# Patient Record
Sex: MALE | Race: WHITE | NOT HISPANIC OR LATINO
[De-identification: names, ages, dates, MRNs, and addresses within clinical notes are randomized per-mention and may not be internally consistent; named-entity substitution may affect disease eponyms.]

---

## 2017-04-20 PROBLEM — Z00.00 ENCOUNTER FOR PREVENTIVE HEALTH EXAMINATION: Status: ACTIVE | Noted: 2017-04-20

## 2017-04-28 ENCOUNTER — APPOINTMENT (OUTPATIENT)
Dept: ORTHOPEDIC SURGERY | Facility: CLINIC | Age: 82
End: 2017-04-28

## 2017-04-28 VITALS
WEIGHT: 170 LBS | DIASTOLIC BLOOD PRESSURE: 65 MMHG | SYSTOLIC BLOOD PRESSURE: 110 MMHG | BODY MASS INDEX: 29.02 KG/M2 | HEIGHT: 64 IN | HEART RATE: 84 BPM

## 2017-04-28 DIAGNOSIS — Z86.39 PERSONAL HISTORY OF OTHER ENDOCRINE, NUTRITIONAL AND METABOLIC DISEASE: ICD-10-CM

## 2017-06-22 ENCOUNTER — OUTPATIENT (OUTPATIENT)
Dept: OUTPATIENT SERVICES | Facility: HOSPITAL | Age: 82
LOS: 1 days | End: 2017-06-22
Payer: MEDICARE

## 2017-06-22 VITALS
HEIGHT: 62 IN | RESPIRATION RATE: 16 BRPM | OXYGEN SATURATION: 98 % | HEART RATE: 88 BPM | SYSTOLIC BLOOD PRESSURE: 130 MMHG | WEIGHT: 177.47 LBS | DIASTOLIC BLOOD PRESSURE: 70 MMHG | TEMPERATURE: 99 F

## 2017-06-22 DIAGNOSIS — G56.01 CARPAL TUNNEL SYNDROME, RIGHT UPPER LIMB: ICD-10-CM

## 2017-06-22 DIAGNOSIS — Z01.818 ENCOUNTER FOR OTHER PREPROCEDURAL EXAMINATION: ICD-10-CM

## 2017-06-22 DIAGNOSIS — Z98.42 CATARACT EXTRACTION STATUS, LEFT EYE: Chronic | ICD-10-CM

## 2017-06-22 DIAGNOSIS — Z98.41 CATARACT EXTRACTION STATUS, RIGHT EYE: Chronic | ICD-10-CM

## 2017-06-22 DIAGNOSIS — Z98.890 OTHER SPECIFIED POSTPROCEDURAL STATES: Chronic | ICD-10-CM

## 2017-06-22 LAB
ANION GAP SERPL CALC-SCNC: 4 MMOL/L — LOW (ref 5–17)
BUN SERPL-MCNC: 29 MG/DL — HIGH (ref 7–23)
CALCIUM SERPL-MCNC: 9.5 MG/DL — SIGNIFICANT CHANGE UP (ref 8.4–10.5)
CHLORIDE SERPL-SCNC: 104 MMOL/L — SIGNIFICANT CHANGE UP (ref 96–108)
CO2 SERPL-SCNC: 31 MMOL/L — SIGNIFICANT CHANGE UP (ref 22–31)
CREAT SERPL-MCNC: 1.15 MG/DL — SIGNIFICANT CHANGE UP (ref 0.5–1.3)
GLUCOSE SERPL-MCNC: 353 MG/DL — HIGH (ref 70–99)
HCT VFR BLD CALC: 42.5 % — SIGNIFICANT CHANGE UP (ref 39–50)
HGB BLD-MCNC: 14.4 G/DL — SIGNIFICANT CHANGE UP (ref 13–17)
MCHC RBC-ENTMCNC: 29.2 PG — SIGNIFICANT CHANGE UP (ref 27–34)
MCHC RBC-ENTMCNC: 33.8 GM/DL — SIGNIFICANT CHANGE UP (ref 32–36)
MCV RBC AUTO: 86.3 FL — SIGNIFICANT CHANGE UP (ref 80–100)
PLATELET # BLD AUTO: 153 K/UL — SIGNIFICANT CHANGE UP (ref 150–400)
POTASSIUM SERPL-MCNC: 5 MMOL/L — SIGNIFICANT CHANGE UP (ref 3.5–5.3)
POTASSIUM SERPL-SCNC: 5 MMOL/L — SIGNIFICANT CHANGE UP (ref 3.5–5.3)
RBC # BLD: 4.92 M/UL — SIGNIFICANT CHANGE UP (ref 4.2–5.8)
RBC # FLD: 12.6 % — SIGNIFICANT CHANGE UP (ref 10.3–14.5)
SODIUM SERPL-SCNC: 139 MMOL/L — SIGNIFICANT CHANGE UP (ref 135–145)
WBC # BLD: 7.5 K/UL — SIGNIFICANT CHANGE UP (ref 3.8–10.5)
WBC # FLD AUTO: 7.5 K/UL — SIGNIFICANT CHANGE UP (ref 3.8–10.5)

## 2017-06-22 PROCEDURE — 83036 HEMOGLOBIN GLYCOSYLATED A1C: CPT

## 2017-06-22 PROCEDURE — 80048 BASIC METABOLIC PNL TOTAL CA: CPT

## 2017-06-22 PROCEDURE — 85027 COMPLETE CBC AUTOMATED: CPT

## 2017-06-22 PROCEDURE — 36415 COLL VENOUS BLD VENIPUNCTURE: CPT

## 2017-06-22 PROCEDURE — G0463: CPT

## 2017-06-22 NOTE — H&P PST ADULT - HISTORY OF PRESENT ILLNESS
82yo male patient with approximately 5-6 month history of pain, cramping, numbness and tingling in his right hand. He reports that the pain is about 6-7/10. He is not taking any pain medication. He states that he had some therapy and injection to the area without improvement. He was told that Carpal Tunnel Release is recommended and presents today for PSTs.

## 2017-06-22 NOTE — H&P PST ADULT - PSH
S/P hernia repair  approx 2013  S/P left cataract extraction  approx 2014  S/P right cataract extraction  approx 2014

## 2017-06-22 NOTE — H&P PST ADULT - PMH
BPH (benign prostatic hypertrophy)    Carpal tunnel syndrome of right wrist    Diabetes mellitus, type 2    Obesity, Class I, BMI 30-34.9

## 2017-06-22 NOTE — H&P PST ADULT - MUSCULOSKELETAL
details… detailed exam right hand/no joint warmth/diminished strength/no joint erythema/decreased ROM due to pain/no joint swelling/no calf tenderness

## 2017-06-22 NOTE — H&P PST ADULT - NSANTHOSAYNRD_GEN_A_CORE
No. JAKI screening performed.  STOP BANG Legend: 0-2 = LOW Risk; 3-4 = INTERMEDIATE Risk; 5-8 = HIGH Risk

## 2017-06-22 NOTE — H&P PST ADULT - ASSESSMENT
80yo male patient scheduled for surgery on 6/27/17. He will obtain medical clearance from his PMD. He will obtain instructions on holding his diabetes medication from his PMD. He will be NPO as per Anesthesia and will take Pepcid and Flomax on AM of surgery with a sip of water. All other pre-op instructions reviewed with patient.

## 2017-06-27 ENCOUNTER — APPOINTMENT (OUTPATIENT)
Dept: ORTHOPEDIC SURGERY | Facility: HOSPITAL | Age: 82
End: 2017-06-27

## 2017-06-28 ENCOUNTER — CHART COPY (OUTPATIENT)
Age: 82
End: 2017-06-28

## 2018-01-12 ENCOUNTER — APPOINTMENT (OUTPATIENT)
Dept: ORTHOPEDIC SURGERY | Facility: CLINIC | Age: 83
End: 2018-01-12
Payer: MEDICARE

## 2018-01-12 VITALS — HEIGHT: 64 IN | BODY MASS INDEX: 29.02 KG/M2 | WEIGHT: 170 LBS

## 2018-01-12 PROCEDURE — 99214 OFFICE O/P EST MOD 30 MIN: CPT

## 2018-01-22 ENCOUNTER — OUTPATIENT (OUTPATIENT)
Dept: OUTPATIENT SERVICES | Facility: HOSPITAL | Age: 83
LOS: 1 days | End: 2018-01-22
Payer: MEDICARE

## 2018-01-22 VITALS
DIASTOLIC BLOOD PRESSURE: 75 MMHG | RESPIRATION RATE: 14 BRPM | HEIGHT: 62 IN | WEIGHT: 175.05 LBS | HEART RATE: 88 BPM | SYSTOLIC BLOOD PRESSURE: 126 MMHG | TEMPERATURE: 99 F

## 2018-01-22 DIAGNOSIS — G56.01 CARPAL TUNNEL SYNDROME, RIGHT UPPER LIMB: ICD-10-CM

## 2018-01-22 DIAGNOSIS — Z01.818 ENCOUNTER FOR OTHER PREPROCEDURAL EXAMINATION: ICD-10-CM

## 2018-01-22 DIAGNOSIS — Z98.42 CATARACT EXTRACTION STATUS, LEFT EYE: Chronic | ICD-10-CM

## 2018-01-22 DIAGNOSIS — Z98.890 OTHER SPECIFIED POSTPROCEDURAL STATES: Chronic | ICD-10-CM

## 2018-01-22 DIAGNOSIS — Z98.41 CATARACT EXTRACTION STATUS, RIGHT EYE: Chronic | ICD-10-CM

## 2018-01-22 LAB
ALBUMIN SERPL ELPH-MCNC: 3.5 G/DL — SIGNIFICANT CHANGE UP (ref 3.3–5)
ALP SERPL-CCNC: 75 U/L — SIGNIFICANT CHANGE UP (ref 30–120)
ALT FLD-CCNC: 22 U/L DA — SIGNIFICANT CHANGE UP (ref 10–60)
ANION GAP SERPL CALC-SCNC: 8 MMOL/L — SIGNIFICANT CHANGE UP (ref 5–17)
AST SERPL-CCNC: 19 U/L — SIGNIFICANT CHANGE UP (ref 10–40)
BILIRUB SERPL-MCNC: 0.6 MG/DL — SIGNIFICANT CHANGE UP (ref 0.2–1.2)
BUN SERPL-MCNC: 26 MG/DL — HIGH (ref 7–23)
CALCIUM SERPL-MCNC: 9 MG/DL — SIGNIFICANT CHANGE UP (ref 8.4–10.5)
CHLORIDE SERPL-SCNC: 102 MMOL/L — SIGNIFICANT CHANGE UP (ref 96–108)
CO2 SERPL-SCNC: 28 MMOL/L — SIGNIFICANT CHANGE UP (ref 22–31)
CREAT SERPL-MCNC: 1.06 MG/DL — SIGNIFICANT CHANGE UP (ref 0.5–1.3)
GLUCOSE SERPL-MCNC: 288 MG/DL — HIGH (ref 70–99)
HBA1C BLD-MCNC: 8.1 % — HIGH (ref 4–5.6)
HCT VFR BLD CALC: 40.8 % — SIGNIFICANT CHANGE UP (ref 39–50)
HGB BLD-MCNC: 13.8 G/DL — SIGNIFICANT CHANGE UP (ref 13–17)
MCHC RBC-ENTMCNC: 29 PG — SIGNIFICANT CHANGE UP (ref 27–34)
MCHC RBC-ENTMCNC: 33.9 GM/DL — SIGNIFICANT CHANGE UP (ref 32–36)
MCV RBC AUTO: 85.5 FL — SIGNIFICANT CHANGE UP (ref 80–100)
PLATELET # BLD AUTO: 114 K/UL — LOW (ref 150–400)
POTASSIUM SERPL-MCNC: 4.3 MMOL/L — SIGNIFICANT CHANGE UP (ref 3.5–5.3)
POTASSIUM SERPL-SCNC: 4.3 MMOL/L — SIGNIFICANT CHANGE UP (ref 3.5–5.3)
PROT SERPL-MCNC: 7.7 G/DL — SIGNIFICANT CHANGE UP (ref 6–8.3)
RBC # BLD: 4.77 M/UL — SIGNIFICANT CHANGE UP (ref 4.2–5.8)
RBC # FLD: 13.1 % — SIGNIFICANT CHANGE UP (ref 10.3–14.5)
SODIUM SERPL-SCNC: 138 MMOL/L — SIGNIFICANT CHANGE UP (ref 135–145)
WBC # BLD: 6.5 K/UL — SIGNIFICANT CHANGE UP (ref 3.8–10.5)
WBC # FLD AUTO: 6.5 K/UL — SIGNIFICANT CHANGE UP (ref 3.8–10.5)

## 2018-01-22 PROCEDURE — 93010 ELECTROCARDIOGRAM REPORT: CPT | Mod: NC

## 2018-01-22 PROCEDURE — G0463: CPT

## 2018-01-22 PROCEDURE — 36415 COLL VENOUS BLD VENIPUNCTURE: CPT

## 2018-01-22 PROCEDURE — 80053 COMPREHEN METABOLIC PANEL: CPT

## 2018-01-22 PROCEDURE — 83036 HEMOGLOBIN GLYCOSYLATED A1C: CPT

## 2018-01-22 PROCEDURE — 85027 COMPLETE CBC AUTOMATED: CPT

## 2018-01-22 PROCEDURE — 93005 ELECTROCARDIOGRAM TRACING: CPT

## 2018-01-22 NOTE — H&P PST ADULT - PROBLEM SELECTOR PLAN 1
"Endoscopic right carpal tunnel release" on 1/30/18  Pre op instructions were reviewed and signed  Patient will obtain medical clearance, instructions for diabetes medications

## 2018-01-22 NOTE — H&P PST ADULT - HISTORY OF PRESENT ILLNESS
81 yo male is scheduled fir 'endoscopic right carpal tunnel release" on 1/30/18 with David Ramirez MD.  Complains of right wrist pain with carpal tunnel syndrome for 6 months with numbness and tingling in fingers.

## 2018-01-22 NOTE — H&P PST ADULT - MUSCULOSKELETAL
details… detailed exam no joint warmth/no joint erythema/no joint swelling/decreased ROM due to pain/no calf tenderness

## 2018-01-25 NOTE — PROVIDER CONTACT NOTE (OTHER) - ASSESSMENT
Patient now understands the importance of glucose control with pending surgery, he agreed to comitor 2 x/day and report to  PCP if glucose levels remain > 200mg/dl

## 2018-01-25 NOTE — PROVIDER CONTACT NOTE (OTHER) - BACKGROUND
Type 2 diabetes on Trajenta 5 mg po daily and Glimepiride 4 mg po daily only monitors his glucose every AM

## 2018-01-30 ENCOUNTER — APPOINTMENT (OUTPATIENT)
Dept: ORTHOPEDIC SURGERY | Facility: HOSPITAL | Age: 83
End: 2018-01-30

## 2018-10-09 PROBLEM — E11.9 TYPE 2 DIABETES MELLITUS WITHOUT COMPLICATIONS: Chronic | Status: ACTIVE | Noted: 2017-06-22

## 2018-10-09 PROBLEM — E66.9 OBESITY, UNSPECIFIED: Chronic | Status: ACTIVE | Noted: 2017-06-22

## 2018-10-09 PROBLEM — N40.0 BENIGN PROSTATIC HYPERPLASIA WITHOUT LOWER URINARY TRACT SYMPTOMS: Chronic | Status: ACTIVE | Noted: 2017-06-22

## 2018-10-17 ENCOUNTER — APPOINTMENT (OUTPATIENT)
Dept: ORTHOPEDIC SURGERY | Facility: CLINIC | Age: 83
End: 2018-10-17

## 2019-01-02 ENCOUNTER — APPOINTMENT (OUTPATIENT)
Dept: ORTHOPEDIC SURGERY | Facility: CLINIC | Age: 84
End: 2019-01-02
Payer: COMMERCIAL

## 2019-01-02 VITALS
WEIGHT: 188 LBS | DIASTOLIC BLOOD PRESSURE: 67 MMHG | BODY MASS INDEX: 35.5 KG/M2 | HEART RATE: 87 BPM | HEIGHT: 61 IN | SYSTOLIC BLOOD PRESSURE: 121 MMHG

## 2019-01-02 PROCEDURE — 99214 OFFICE O/P EST MOD 30 MIN: CPT

## 2019-02-06 ENCOUNTER — APPOINTMENT (OUTPATIENT)
Dept: ORTHOPEDIC SURGERY | Facility: CLINIC | Age: 84
End: 2019-02-06

## 2019-02-21 RX ORDER — GLIMEPIRIDE 1 MG
1 TABLET ORAL
Qty: 0 | Refills: 0 | COMMUNITY

## 2019-02-27 ENCOUNTER — OUTPATIENT (OUTPATIENT)
Dept: OUTPATIENT SERVICES | Facility: HOSPITAL | Age: 84
LOS: 1 days | End: 2019-02-27
Payer: MEDICARE

## 2019-02-27 VITALS
TEMPERATURE: 99 F | WEIGHT: 179.9 LBS | SYSTOLIC BLOOD PRESSURE: 139 MMHG | HEART RATE: 98 BPM | HEIGHT: 62 IN | OXYGEN SATURATION: 96 % | DIASTOLIC BLOOD PRESSURE: 77 MMHG

## 2019-02-27 DIAGNOSIS — Z98.41 CATARACT EXTRACTION STATUS, RIGHT EYE: Chronic | ICD-10-CM

## 2019-02-27 DIAGNOSIS — Z98.42 CATARACT EXTRACTION STATUS, LEFT EYE: Chronic | ICD-10-CM

## 2019-02-27 DIAGNOSIS — Z98.890 OTHER SPECIFIED POSTPROCEDURAL STATES: Chronic | ICD-10-CM

## 2019-02-27 DIAGNOSIS — G56.01 CARPAL TUNNEL SYNDROME, RIGHT UPPER LIMB: ICD-10-CM

## 2019-02-27 DIAGNOSIS — Z01.818 ENCOUNTER FOR OTHER PREPROCEDURAL EXAMINATION: ICD-10-CM

## 2019-02-27 LAB
ALBUMIN SERPL ELPH-MCNC: 3.8 G/DL — SIGNIFICANT CHANGE UP (ref 3.3–5)
ALP SERPL-CCNC: 84 U/L — SIGNIFICANT CHANGE UP (ref 30–120)
ALT FLD-CCNC: 22 U/L DA — SIGNIFICANT CHANGE UP (ref 10–60)
ANION GAP SERPL CALC-SCNC: 8 MMOL/L — SIGNIFICANT CHANGE UP (ref 5–17)
AST SERPL-CCNC: 18 U/L — SIGNIFICANT CHANGE UP (ref 10–40)
BILIRUB SERPL-MCNC: 0.8 MG/DL — SIGNIFICANT CHANGE UP (ref 0.2–1.2)
BUN SERPL-MCNC: 23 MG/DL — SIGNIFICANT CHANGE UP (ref 7–23)
CALCIUM SERPL-MCNC: 9.6 MG/DL — SIGNIFICANT CHANGE UP (ref 8.4–10.5)
CHLORIDE SERPL-SCNC: 102 MMOL/L — SIGNIFICANT CHANGE UP (ref 96–108)
CO2 SERPL-SCNC: 30 MMOL/L — SIGNIFICANT CHANGE UP (ref 22–31)
CREAT SERPL-MCNC: 1.08 MG/DL — SIGNIFICANT CHANGE UP (ref 0.5–1.3)
GLUCOSE SERPL-MCNC: 260 MG/DL — HIGH (ref 70–99)
HCT VFR BLD CALC: 43.9 % — SIGNIFICANT CHANGE UP (ref 39–50)
HGB BLD-MCNC: 14.3 G/DL — SIGNIFICANT CHANGE UP (ref 13–17)
MCHC RBC-ENTMCNC: 28.5 PG — SIGNIFICANT CHANGE UP (ref 27–34)
MCHC RBC-ENTMCNC: 32.6 GM/DL — SIGNIFICANT CHANGE UP (ref 32–36)
MCV RBC AUTO: 87.6 FL — SIGNIFICANT CHANGE UP (ref 80–100)
NRBC # BLD: 0 /100 WBCS — SIGNIFICANT CHANGE UP (ref 0–0)
PLATELET # BLD AUTO: 117 K/UL — LOW (ref 150–400)
POTASSIUM SERPL-MCNC: 4.4 MMOL/L — SIGNIFICANT CHANGE UP (ref 3.5–5.3)
POTASSIUM SERPL-SCNC: 4.4 MMOL/L — SIGNIFICANT CHANGE UP (ref 3.5–5.3)
PROT SERPL-MCNC: 7.6 G/DL — SIGNIFICANT CHANGE UP (ref 6–8.3)
RBC # BLD: 5.01 M/UL — SIGNIFICANT CHANGE UP (ref 4.2–5.8)
RBC # FLD: 13.7 % — SIGNIFICANT CHANGE UP (ref 10.3–14.5)
SODIUM SERPL-SCNC: 140 MMOL/L — SIGNIFICANT CHANGE UP (ref 135–145)
WBC # BLD: 5.72 K/UL — SIGNIFICANT CHANGE UP (ref 3.8–10.5)
WBC # FLD AUTO: 5.72 K/UL — SIGNIFICANT CHANGE UP (ref 3.8–10.5)

## 2019-02-27 PROCEDURE — 80053 COMPREHEN METABOLIC PANEL: CPT

## 2019-02-27 PROCEDURE — 85027 COMPLETE CBC AUTOMATED: CPT

## 2019-02-27 PROCEDURE — 36415 COLL VENOUS BLD VENIPUNCTURE: CPT

## 2019-02-27 PROCEDURE — G0463: CPT

## 2019-02-27 PROCEDURE — 83036 HEMOGLOBIN GLYCOSYLATED A1C: CPT

## 2019-02-27 NOTE — H&P PST ADULT - HISTORY OF PRESENT ILLNESS
82 yo male presents to [facility] for scheduled endoscopic RIGHT carpal tunnel release on 3/19/19 with David Ramirez MD.  Patient accompanied by HHA and Iranian speaking,  videophone used.  Complains of right wrist pain with numbness and loss of sensation in right fingers for many years.  He was previously scheduled for surgery and postponed at his decision.

## 2019-02-27 NOTE — H&P PST ADULT - PROBLEM SELECTOR PLAN 1
"Endoscopic right carpal tunnel release" on 3/19/19  Diagnostics ordered  Medical and cardiac clearances in chart  Instructions reviewed using Monegasque video .  Best wishes offered

## 2019-02-27 NOTE — H&P PST ADULT - ASSESSMENT
82 yo male is scheduled for endoscopic RIGHT carpal tunnel release on 3/19/19 at [facility] with Dr Ramirez.

## 2019-02-28 LAB — HBA1C BLD-MCNC: 9.1 % — HIGH (ref 4–5.6)

## 2019-03-18 ENCOUNTER — TRANSCRIPTION ENCOUNTER (OUTPATIENT)
Age: 84
End: 2019-03-18

## 2019-03-19 ENCOUNTER — APPOINTMENT (OUTPATIENT)
Dept: ORTHOPEDIC SURGERY | Facility: HOSPITAL | Age: 84
End: 2019-03-19

## 2019-03-19 ENCOUNTER — OUTPATIENT (OUTPATIENT)
Dept: OUTPATIENT SERVICES | Facility: HOSPITAL | Age: 84
LOS: 1 days | Discharge: HOME | End: 2019-03-19
Payer: MEDICARE

## 2019-03-19 VITALS — SYSTOLIC BLOOD PRESSURE: 150 MMHG | HEART RATE: 72 BPM | DIASTOLIC BLOOD PRESSURE: 72 MMHG | RESPIRATION RATE: 15 BRPM

## 2019-03-19 VITALS
TEMPERATURE: 98 F | OXYGEN SATURATION: 99 % | SYSTOLIC BLOOD PRESSURE: 142 MMHG | HEIGHT: 62 IN | DIASTOLIC BLOOD PRESSURE: 74 MMHG | WEIGHT: 178.57 LBS | RESPIRATION RATE: 16 BRPM | HEART RATE: 82 BPM

## 2019-03-19 DIAGNOSIS — Z98.41 CATARACT EXTRACTION STATUS, RIGHT EYE: Chronic | ICD-10-CM

## 2019-03-19 DIAGNOSIS — Z98.42 CATARACT EXTRACTION STATUS, LEFT EYE: Chronic | ICD-10-CM

## 2019-03-19 DIAGNOSIS — G56.01 CARPAL TUNNEL SYNDROME, RIGHT UPPER LIMB: ICD-10-CM

## 2019-03-19 DIAGNOSIS — Z98.890 OTHER SPECIFIED POSTPROCEDURAL STATES: Chronic | ICD-10-CM

## 2019-03-19 LAB — GLUCOSE BLDC GLUCOMTR-MCNC: 157 MG/DL — HIGH (ref 70–99)

## 2019-03-19 PROCEDURE — 82962 GLUCOSE BLOOD TEST: CPT

## 2019-03-19 PROCEDURE — 29848 WRIST ENDOSCOPY/SURGERY: CPT | Mod: RT

## 2019-03-19 RX ORDER — CHLORHEXIDINE GLUCONATE 213 G/1000ML
1 SOLUTION TOPICAL ONCE
Qty: 0 | Refills: 0 | Status: COMPLETED | OUTPATIENT
Start: 2019-03-19 | End: 2019-03-19

## 2019-03-19 RX ORDER — SODIUM CHLORIDE 9 MG/ML
1000 INJECTION, SOLUTION INTRAVENOUS
Qty: 0 | Refills: 0 | Status: DISCONTINUED | OUTPATIENT
Start: 2019-03-19 | End: 2019-03-19

## 2019-03-19 RX ORDER — OXYCODONE HYDROCHLORIDE 5 MG/1
5 TABLET ORAL ONCE
Qty: 0 | Refills: 0 | Status: DISCONTINUED | OUTPATIENT
Start: 2019-03-19 | End: 2019-03-19

## 2019-03-19 RX ORDER — HYDROMORPHONE HYDROCHLORIDE 2 MG/ML
0.5 INJECTION INTRAMUSCULAR; INTRAVENOUS; SUBCUTANEOUS
Qty: 0 | Refills: 0 | Status: DISCONTINUED | OUTPATIENT
Start: 2019-03-19 | End: 2019-03-19

## 2019-03-19 RX ORDER — CEFAZOLIN SODIUM 1 G
2000 VIAL (EA) INJECTION ONCE
Qty: 0 | Refills: 0 | Status: COMPLETED | OUTPATIENT
Start: 2019-03-19 | End: 2019-03-19

## 2019-03-19 RX ORDER — OXYCODONE HYDROCHLORIDE 5 MG/1
10 TABLET ORAL ONCE
Qty: 0 | Refills: 0 | Status: DISCONTINUED | OUTPATIENT
Start: 2019-03-19 | End: 2019-03-19

## 2019-03-19 RX ORDER — ACETAMINOPHEN WITH CODEINE 300MG-30MG
1 TABLET ORAL
Qty: 5 | Refills: 0
Start: 2019-03-19

## 2019-03-19 RX ADMIN — SODIUM CHLORIDE 50 MILLILITER(S): 9 INJECTION, SOLUTION INTRAVENOUS at 11:56

## 2019-03-19 RX ADMIN — CHLORHEXIDINE GLUCONATE 1 APPLICATION(S): 213 SOLUTION TOPICAL at 10:37

## 2019-03-19 NOTE — ASU DISCHARGE PLAN (ADULT/PEDIATRIC) - CALL YOUR DOCTOR IF YOU HAVE ANY OF THE FOLLOWING:
Fever greater than (need to indicate Fahrenheit or Celsius)/Pain not relieved by Medications/Wound/Surgical Site with redness, or foul smelling discharge or pus/Numbness, tingling, color or temperature change to extremity

## 2019-03-19 NOTE — ASU DISCHARGE PLAN (ADULT/PEDIATRIC) - PAIN MANAGEMENT
Prescriptions electronically submitted to pharmacy from [facility] Prescriptions electronically submitted to pharmacy from [facility]/Prescription called to pharmacy

## 2019-03-19 NOTE — ASU DISCHARGE PLAN (ADULT/PEDIATRIC) - CARE PROVIDER_API CALL
David Ramirez (MD)  Orthopaedic Surgery; Surgery of the Hand  [address], [facility] [address]  [place] [address]  Phone: [phone]  Fax: [phone]  Follow Up Time: 1-3 days

## 2019-03-22 ENCOUNTER — APPOINTMENT (OUTPATIENT)
Dept: ORTHOPEDIC SURGERY | Facility: CLINIC | Age: 84
End: 2019-03-22
Payer: MEDICARE

## 2019-03-22 PROCEDURE — 99024 POSTOP FOLLOW-UP VISIT: CPT

## 2019-03-22 NOTE — HISTORY OF PRESENT ILLNESS
[de-identified] : 3 days postoperative [de-identified] : 3 days status post endoscopic right carpal tunnel release.  He came along, but I called her son on the phone, who spoke to him and he, translating to Kuwaiti.  He told me that he that he is doing well in his hand no longer wakes him up at night. [de-identified] : Examination of his right wrist, and hand after the dressing was removed, demonstrates his incision to be clean and dry.  There is mild swelling and ecchymosis.  He has appropriate motion of his digits.  He has improved sensation to light touch along the median nerve distribution, with normal sensation along the radial and ulnar nerve distributions. [de-identified] : Stable, 3 days postoperative. [de-identified] : The suture ends were cut and Steri-Strips were applied.  He and his son were instructed on local wound care and gentle range of motion exercises. He will followup in 3 weeks.  At that time, he would like to discuss when he should schedule the left side.

## 2019-04-10 ENCOUNTER — APPOINTMENT (OUTPATIENT)
Dept: ORTHOPEDIC SURGERY | Facility: CLINIC | Age: 84
End: 2019-04-10
Payer: MEDICARE

## 2019-04-10 PROCEDURE — 99024 POSTOP FOLLOW-UP VISIT: CPT

## 2019-04-10 NOTE — HISTORY OF PRESENT ILLNESS
[de-identified] : 22 days status post endoscopic right carpal tunnel release.  He came alone, but I called his son on the phone, who spoke to him and he, translating to Barbadian.  \par \par He states that his pain is improved, but he continues to have so residual me numbness compared to preoperative.  However, his pain, and it is much improved. [de-identified] : 22 days postoperative [de-identified] : Examination of his right wrist, and hand demonstrates his incision to be healing well.  He has improved range of motion of his digits.  His neurologic examination is somewhat difficult to evaluate today, but he does have complaints of some residual numbness along the median nerve distribution. [de-identified] : Again, I spoke to him, using his son on the telephone as a  to Bulgarian.  He would like to go for hand therapy.  He was given a proper referral.  He will followup in one month.  At that time, a determination will be made whether or not to schedule endoscopic left carpal tunnel release. [de-identified] : Stable, 22 days postoperative.  He has residual numbness at this point as expected appeared however, his pain and burning at night or improved.

## 2019-05-09 PROBLEM — G56.01 CARPAL TUNNEL SYNDROME OF RIGHT WRIST: Status: ACTIVE | Noted: 2017-04-28

## 2019-05-10 ENCOUNTER — APPOINTMENT (OUTPATIENT)
Dept: ORTHOPEDIC SURGERY | Facility: CLINIC | Age: 84
End: 2019-05-10
Payer: MEDICARE

## 2019-05-10 VITALS — BODY MASS INDEX: 32.1 KG/M2 | HEIGHT: 64 IN | WEIGHT: 188 LBS

## 2019-05-10 DIAGNOSIS — G56.01 CARPAL TUNNEL SYNDROME, RIGHT UPPER LIMB: ICD-10-CM

## 2019-05-10 PROCEDURE — 99024 POSTOP FOLLOW-UP VISIT: CPT

## 2019-05-10 NOTE — HISTORY OF PRESENT ILLNESS
[de-identified] : 52 days postoperative [de-identified] : 52 days status post endoscopic right carpal tunnel release.  He came alone, but I called his son on the phone, who spoke to him and he, translating to Cymraes.  \par \par He states that his pain is improved, but he continues to have so residual me numbness compared to preoperative.  However, he no longer has the burning and pain that he had preoperatively.  He does have some filling of the hand being pulled when it is cold outside her.  He is in occupational therapy and he has noted improvement with therapy.  He is bothered by the left hand and would like to discuss scheduling.  Carpal tunnel release on that side. [de-identified] : Examination of his right wrist, and hand demonstrates his incision to be healing well.  He has improved range of motion of his digits.  His neurologic examination is somewhat difficult to evaluate today, but he does have complaints of some residual numbness along the median nerve distribution.\par \par Examination of his left hand demonstrates obvious thenar atrophy and decreased sensation to light touch along the median nerve distribution.  Provocative signs for carpal tunnel syndrome, more difficult to evaluate. [de-identified] : Stable, 52 days postoperative.  He has residual numbness at this point as expected appeared however, his pain and burning at night or improved. [de-identified] : I spoke to him and his son on the telephone, and his son helped with translation.  With regard to the right hand, he will continue occupational therapy and a home exercise program.\par \par With regard to his left carpal tunnel syndrome, he would like to go and schedule release.  Again, she understands that there are no guarantees that his symptoms will improve.  We previously discussed all associated risks and benefits.  Again, these were discussed and he understands that if I cannot safely release the nerve endoscopically, then I may need to convert to an open release.  I also discussed risks of nerve injury, stiffness, scar tissue, and other associated complications.

## 2019-05-10 NOTE — HISTORY OF PRESENT ILLNESS
[de-identified] : 52 days postoperative [de-identified] : 52 days status post endoscopic right carpal tunnel release.  He came alone, but I called his son on the phone, who spoke to him and he, translating to Canadian.  \par \par He states that his pain is improved, but he continues to have so residual me numbness compared to preoperative.  However, he no longer has the burning and pain that he had preoperatively.  He does have some filling of the hand being pulled when it is cold outside her.  He is in occupational therapy and he has noted improvement with therapy.  He is bothered by the left hand and would like to discuss scheduling.  Carpal tunnel release on that side. [de-identified] : Examination of his right wrist, and hand demonstrates his incision to be healing well.  He has improved range of motion of his digits.  His neurologic examination is somewhat difficult to evaluate today, but he does have complaints of some residual numbness along the median nerve distribution.\par \par Examination of his left hand demonstrates obvious thenar atrophy and decreased sensation to light touch along the median nerve distribution.  Provocative signs for carpal tunnel syndrome, more difficult to evaluate. [de-identified] : Stable, 52 days postoperative.  He has residual numbness at this point as expected appeared however, his pain and burning at night or improved. [de-identified] : I spoke to him and his son on the telephone, and his son helped with translation.  With regard to the right hand, he will continue occupational therapy and a home exercise program.\par \par With regard to his left carpal tunnel syndrome, he would like to go and schedule release.  Again, she understands that there are no guarantees that his symptoms will improve.  We previously discussed all associated risks and benefits.  Again, these were discussed and he understands that if I cannot safely release the nerve endoscopically, then I may need to convert to an open release.  I also discussed risks of nerve injury, stiffness, scar tissue, and other associated complications.

## 2019-05-14 ENCOUNTER — OUTPATIENT (OUTPATIENT)
Dept: OUTPATIENT SERVICES | Facility: HOSPITAL | Age: 84
LOS: 1 days | End: 2019-05-14
Payer: MEDICARE

## 2019-05-14 VITALS
OXYGEN SATURATION: 98 % | HEIGHT: 62 IN | SYSTOLIC BLOOD PRESSURE: 129 MMHG | DIASTOLIC BLOOD PRESSURE: 77 MMHG | RESPIRATION RATE: 18 BRPM | WEIGHT: 184.09 LBS | TEMPERATURE: 98 F

## 2019-05-14 DIAGNOSIS — Z01.818 ENCOUNTER FOR OTHER PREPROCEDURAL EXAMINATION: ICD-10-CM

## 2019-05-14 DIAGNOSIS — G56.02 CARPAL TUNNEL SYNDROME, LEFT UPPER LIMB: ICD-10-CM

## 2019-05-14 DIAGNOSIS — Z98.42 CATARACT EXTRACTION STATUS, LEFT EYE: Chronic | ICD-10-CM

## 2019-05-14 DIAGNOSIS — Z98.890 OTHER SPECIFIED POSTPROCEDURAL STATES: Chronic | ICD-10-CM

## 2019-05-14 DIAGNOSIS — Z98.41 CATARACT EXTRACTION STATUS, RIGHT EYE: Chronic | ICD-10-CM

## 2019-05-14 LAB
ANION GAP SERPL CALC-SCNC: 6 MMOL/L — SIGNIFICANT CHANGE UP (ref 5–17)
BUN SERPL-MCNC: 24 MG/DL — HIGH (ref 7–23)
CALCIUM SERPL-MCNC: 9.4 MG/DL — SIGNIFICANT CHANGE UP (ref 8.4–10.5)
CHLORIDE SERPL-SCNC: 105 MMOL/L — SIGNIFICANT CHANGE UP (ref 96–108)
CO2 SERPL-SCNC: 29 MMOL/L — SIGNIFICANT CHANGE UP (ref 22–31)
CREAT SERPL-MCNC: 1.13 MG/DL — SIGNIFICANT CHANGE UP (ref 0.5–1.3)
GLUCOSE SERPL-MCNC: 326 MG/DL — HIGH (ref 70–99)
HBA1C BLD-MCNC: 10.7 % — HIGH (ref 4–5.6)
HCT VFR BLD CALC: 45.8 % — SIGNIFICANT CHANGE UP (ref 39–50)
HGB BLD-MCNC: 14.5 G/DL — SIGNIFICANT CHANGE UP (ref 13–17)
MCHC RBC-ENTMCNC: 28 PG — SIGNIFICANT CHANGE UP (ref 27–34)
MCHC RBC-ENTMCNC: 31.7 GM/DL — LOW (ref 32–36)
MCV RBC AUTO: 88.6 FL — SIGNIFICANT CHANGE UP (ref 80–100)
NRBC # BLD: 0 /100 WBCS — SIGNIFICANT CHANGE UP (ref 0–0)
PLATELET # BLD AUTO: 119 K/UL — LOW (ref 150–400)
POTASSIUM SERPL-MCNC: 5.3 MMOL/L — SIGNIFICANT CHANGE UP (ref 3.5–5.3)
POTASSIUM SERPL-SCNC: 5.3 MMOL/L — SIGNIFICANT CHANGE UP (ref 3.5–5.3)
RBC # BLD: 5.17 M/UL — SIGNIFICANT CHANGE UP (ref 4.2–5.8)
RBC # FLD: 13.3 % — SIGNIFICANT CHANGE UP (ref 10.3–14.5)
SODIUM SERPL-SCNC: 140 MMOL/L — SIGNIFICANT CHANGE UP (ref 135–145)
WBC # BLD: 5.42 K/UL — SIGNIFICANT CHANGE UP (ref 3.8–10.5)
WBC # FLD AUTO: 5.42 K/UL — SIGNIFICANT CHANGE UP (ref 3.8–10.5)

## 2019-05-14 PROCEDURE — 36415 COLL VENOUS BLD VENIPUNCTURE: CPT

## 2019-05-14 PROCEDURE — 80048 BASIC METABOLIC PNL TOTAL CA: CPT

## 2019-05-14 PROCEDURE — 83036 HEMOGLOBIN GLYCOSYLATED A1C: CPT

## 2019-05-14 PROCEDURE — G0463: CPT

## 2019-05-14 PROCEDURE — 85027 COMPLETE CBC AUTOMATED: CPT

## 2019-05-14 RX ORDER — DEXTROSE 50 % IN WATER 50 %
25 SYRINGE (ML) INTRAVENOUS ONCE
Refills: 0 | Status: DISCONTINUED | OUTPATIENT
Start: 2019-05-21 | End: 2019-06-05

## 2019-05-14 RX ORDER — GLIMEPIRIDE 1 MG
1 TABLET ORAL
Qty: 0 | Refills: 0 | DISCHARGE

## 2019-05-14 RX ORDER — DEXTROSE 50 % IN WATER 50 %
15 SYRINGE (ML) INTRAVENOUS ONCE
Refills: 0 | Status: DISCONTINUED | OUTPATIENT
Start: 2019-05-21 | End: 2019-06-05

## 2019-05-14 RX ORDER — TAMSULOSIN HYDROCHLORIDE 0.4 MG/1
1 CAPSULE ORAL
Qty: 0 | Refills: 0 | DISCHARGE

## 2019-05-14 RX ORDER — DEXTROSE 50 % IN WATER 50 %
12.5 SYRINGE (ML) INTRAVENOUS ONCE
Refills: 0 | Status: DISCONTINUED | OUTPATIENT
Start: 2019-05-21 | End: 2019-06-05

## 2019-05-14 RX ORDER — GLUCAGON INJECTION, SOLUTION 0.5 MG/.1ML
1 INJECTION, SOLUTION SUBCUTANEOUS ONCE
Refills: 0 | Status: DISCONTINUED | OUTPATIENT
Start: 2019-05-21 | End: 2019-06-05

## 2019-05-14 RX ORDER — SODIUM CHLORIDE 9 MG/ML
1000 INJECTION, SOLUTION INTRAVENOUS
Refills: 0 | Status: DISCONTINUED | OUTPATIENT
Start: 2019-05-21 | End: 2019-06-05

## 2019-05-14 NOTE — H&P PST ADULT - SKIN/BREAST
Saline lock established, blood and first set of cultures sent. Fluid bolus started and PCXR at the bedside.   negative

## 2019-05-14 NOTE — H&P PST ADULT - NSICDXPASTMEDICALHX_GEN_ALL_CORE_FT
PAST MEDICAL HISTORY:  BPH (benign prostatic hypertrophy)     Carpal tunnel syndrome of right wrist 3/19/19    Chronic GERD     Diabetes mellitus, type 2     Mild hypertension no po meds    Obesity, Class I, BMI 30-34.9

## 2019-05-14 NOTE — H&P PST ADULT - NSICDXPASTSURGICALHX_GEN_ALL_CORE_FT
PAST SURGICAL HISTORY:  History of carpal tunnel surgery of right wrist     S/P hernia repair approx 2013    S/P left cataract extraction approx 2014    S/P right cataract extraction approx 2014

## 2019-05-14 NOTE — H&P PST ADULT - NSICDXPROBLEM_GEN_ALL_CORE_FT
PROBLEM DIAGNOSES  Problem: Carpal tunnel syndrome on left  Assessment and Plan: endoscopic left carpal tunnel release on 5/21/19  medical clearance in chart-pending labs results  aide with him  phone used to give instructions

## 2019-05-14 NOTE — H&P PST ADULT - HISTORY OF PRESENT ILLNESS
82 yo male presents to [facility] for scheduled endoscopic left carpal tunnel release on 5/21/19with David Ramirez MD.  Patient accompanied by HHA and Senegalese speaking,  videophone used.  Complains of left wrist pain with numbness and loss of sensation in right fingers for many years. 84 yo male presents to [facility] for scheduled endoscopic left carpal tunnel release on 5/21/19with David Ramirez MD.  Patient accompanied by HHA and Palestinian speaking,  phone used.  Complains of left wrist pain with numbness and loss of sensation in right fingers for many years.

## 2019-05-15 RX ORDER — SODIUM CHLORIDE 9 MG/ML
1000 INJECTION, SOLUTION INTRAVENOUS
Refills: 0 | Status: DISCONTINUED | OUTPATIENT
Start: 2019-05-21 | End: 2019-06-05

## 2019-05-15 NOTE — PROVIDER CONTACT NOTE (OTHER) - RECOMMENDATIONS
See endocrinologist  resume all medications  Monitor glucose    goal range 100 to 200mg/dl  Clearance for  surgery.

## 2019-05-15 NOTE — PROVIDER CONTACT NOTE (OTHER) - ASSESSMENT
Patient  understands the need to see his diabetes doctor to  resume tradjenta.  He is unable to  give me his doctors number which he plans to see today.  He is aware of  need for glucose control before  surgery will be done.  Will call me back with doctors number

## 2019-05-20 ENCOUNTER — TRANSCRIPTION ENCOUNTER (OUTPATIENT)
Age: 84
End: 2019-05-20

## 2019-05-20 NOTE — PROVIDER CONTACT NOTE (OTHER) - RECOMMENDATIONS
called back to confirm  surgery for tomorrow  See post for  diabetes education  No oral diabetes pills  day of surgery

## 2019-05-20 NOTE — PROVIDER CONTACT NOTE (OTHER) - ASSESSMENT
Patient does not understand  need for clearance in order to have surgery, confirmed with PST  clearance was obtained and patient  for surgery tomorrow

## 2019-05-20 NOTE — PROVIDER CONTACT NOTE (OTHER) - SITUATION
Patient called informed me that  he is scheduled for surgery tomorrow,  Endocrine office Dr Beckman on Friday stated  clearance was not given due to hyperglycemia

## 2019-05-21 ENCOUNTER — OUTPATIENT (OUTPATIENT)
Dept: OUTPATIENT SERVICES | Facility: HOSPITAL | Age: 84
LOS: 1 days | End: 2019-05-21
Payer: MEDICARE

## 2019-05-21 ENCOUNTER — APPOINTMENT (OUTPATIENT)
Dept: ORTHOPEDIC SURGERY | Facility: HOSPITAL | Age: 84
End: 2019-05-21

## 2019-05-21 VITALS
HEIGHT: 61 IN | DIASTOLIC BLOOD PRESSURE: 69 MMHG | HEART RATE: 75 BPM | WEIGHT: 177.25 LBS | RESPIRATION RATE: 12 BRPM | TEMPERATURE: 98 F | SYSTOLIC BLOOD PRESSURE: 129 MMHG | OXYGEN SATURATION: 100 %

## 2019-05-21 VITALS
HEART RATE: 73 BPM | RESPIRATION RATE: 14 BRPM | OXYGEN SATURATION: 98 % | SYSTOLIC BLOOD PRESSURE: 139 MMHG | DIASTOLIC BLOOD PRESSURE: 59 MMHG

## 2019-05-21 DIAGNOSIS — G56.02 CARPAL TUNNEL SYNDROME, LEFT UPPER LIMB: ICD-10-CM

## 2019-05-21 DIAGNOSIS — Z98.890 OTHER SPECIFIED POSTPROCEDURAL STATES: Chronic | ICD-10-CM

## 2019-05-21 DIAGNOSIS — Z98.41 CATARACT EXTRACTION STATUS, RIGHT EYE: Chronic | ICD-10-CM

## 2019-05-21 DIAGNOSIS — Z98.42 CATARACT EXTRACTION STATUS, LEFT EYE: Chronic | ICD-10-CM

## 2019-05-21 LAB
GLUCOSE BLDC GLUCOMTR-MCNC: 145 MG/DL — HIGH (ref 70–99)
GLUCOSE BLDC GLUCOMTR-MCNC: 73 MG/DL — SIGNIFICANT CHANGE UP (ref 70–99)
GLUCOSE BLDC GLUCOMTR-MCNC: 85 MG/DL — SIGNIFICANT CHANGE UP (ref 70–99)

## 2019-05-21 PROCEDURE — 82962 GLUCOSE BLOOD TEST: CPT

## 2019-05-21 PROCEDURE — 29848 WRIST ENDOSCOPY/SURGERY: CPT | Mod: 79,LT

## 2019-05-21 PROCEDURE — 29848 WRIST ENDOSCOPY/SURGERY: CPT | Mod: LT

## 2019-05-21 RX ORDER — CHLORHEXIDINE GLUCONATE 213 G/1000ML
1 SOLUTION TOPICAL ONCE
Refills: 0 | Status: COMPLETED | OUTPATIENT
Start: 2019-05-21 | End: 2019-05-21

## 2019-05-21 RX ORDER — HYDROMORPHONE HYDROCHLORIDE 2 MG/ML
0.5 INJECTION INTRAMUSCULAR; INTRAVENOUS; SUBCUTANEOUS
Refills: 0 | Status: DISCONTINUED | OUTPATIENT
Start: 2019-05-21 | End: 2019-05-22

## 2019-05-21 RX ORDER — ONDANSETRON 8 MG/1
4 TABLET, FILM COATED ORAL ONCE
Refills: 0 | Status: COMPLETED | OUTPATIENT
Start: 2019-05-21 | End: 2019-05-21

## 2019-05-21 RX ORDER — ACETAMINOPHEN WITH CODEINE 300MG-30MG
1 TABLET ORAL
Qty: 5 | Refills: 0
Start: 2019-05-21

## 2019-05-21 RX ORDER — OXYCODONE HYDROCHLORIDE 5 MG/1
5 TABLET ORAL ONCE
Refills: 0 | Status: DISCONTINUED | OUTPATIENT
Start: 2019-05-21 | End: 2019-05-22

## 2019-05-21 RX ORDER — CEFAZOLIN SODIUM 1 G
2000 VIAL (EA) INJECTION ONCE
Refills: 0 | Status: COMPLETED | OUTPATIENT
Start: 2019-05-21 | End: 2019-05-21

## 2019-05-21 RX ORDER — SODIUM CHLORIDE 9 MG/ML
1000 INJECTION, SOLUTION INTRAVENOUS
Refills: 0 | Status: DISCONTINUED | OUTPATIENT
Start: 2019-05-21 | End: 2019-05-22

## 2019-05-21 RX ADMIN — CHLORHEXIDINE GLUCONATE 1 APPLICATION(S): 213 SOLUTION TOPICAL at 11:32

## 2019-05-21 RX ADMIN — ONDANSETRON 4 MILLIGRAM(S): 8 TABLET, FILM COATED ORAL at 15:45

## 2019-05-21 NOTE — ASU PATIENT PROFILE, ADULT - PMH
BPH (benign prostatic hypertrophy)    Carpal tunnel syndrome of right wrist  3/19/19  Chronic GERD    Diabetes mellitus, type 2    Mild hypertension  no po meds  Obesity, Class I, BMI 30-34.9

## 2019-05-21 NOTE — ASU PATIENT PROFILE, ADULT - PSH
History of carpal tunnel surgery of right wrist    S/P hernia repair  approx 2013  S/P left cataract extraction  approx 2014  S/P right cataract extraction  approx 2014

## 2019-05-21 NOTE — ASU DISCHARGE PLAN (ADULT/PEDIATRIC) - CARE PROVIDER_API CALL
David Ramirez)  Orthopaedic Surgery; Surgery of the Hand  [address], [facility] [address]  [place] [address]  Phone: [phone]  Fax: [phone]  Follow Up Time:

## 2019-05-21 NOTE — ASU DISCHARGE PLAN (ADULT/PEDIATRIC) - ASU DC SPECIAL INSTRUCTIONSFT
Keep surgical incision clean and dry  Keep left arm elevated with sling  Ice packs as needed for pain and swelling  Mobilize fingers every hour while awake

## 2019-05-22 PROBLEM — I10 ESSENTIAL (PRIMARY) HYPERTENSION: Chronic | Status: ACTIVE | Noted: 2019-05-14

## 2019-05-22 PROBLEM — G56.01 CARPAL TUNNEL SYNDROME, RIGHT UPPER LIMB: Chronic | Status: ACTIVE | Noted: 2017-06-22

## 2019-05-22 PROBLEM — K21.9 GASTRO-ESOPHAGEAL REFLUX DISEASE WITHOUT ESOPHAGITIS: Chronic | Status: ACTIVE | Noted: 2019-05-14

## 2019-05-29 ENCOUNTER — APPOINTMENT (OUTPATIENT)
Dept: ORTHOPEDIC SURGERY | Facility: CLINIC | Age: 84
End: 2019-05-29
Payer: MEDICARE

## 2019-05-31 ENCOUNTER — APPOINTMENT (OUTPATIENT)
Dept: ORTHOPEDIC SURGERY | Facility: CLINIC | Age: 84
End: 2019-05-31
Payer: MEDICARE

## 2019-05-31 PROCEDURE — 99024 POSTOP FOLLOW-UP VISIT: CPT

## 2019-05-31 NOTE — HISTORY OF PRESENT ILLNESS
[de-identified] : 10 days postoperative. [de-identified] : 10 days status post endoscopic left carpal tunnel release.  He is doing well.  I spoke to him and his son on the telephone for translation.  His numbness and tingling and pain are much improved compared to preoperative.\par \par He is status post right carpal tunnel release on 3/19/2019. [de-identified] : Examination of his left hand demonstrates his incision to be clean and dry.  There is minimal erythema, but this appears secondary to inflammation as opposed to an infection.  There is no drainage.  His digital flexion and extension is comparable to preoperative.  He has intact sensation to light touch throughout the digits. [de-identified] : Stable, 10 days postoperative. [de-identified] : The suture end were cut  and Steri-Strips were applied.  He was instructed on local wound care and range of motion exercises.  He was referred to occupational therapy, which she is really going to for the right hand.  He will followup in one month.  He will followup before then if there are any problems.

## 2019-06-28 PROBLEM — G56.02 CARPAL TUNNEL SYNDROME OF LEFT WRIST: Status: ACTIVE | Noted: 2017-04-28

## 2019-06-30 NOTE — ASU PATIENT PROFILE, ADULT - RELATIONSHIP TO PATIENT
"Pt here today from SO home after 911 was called by pt. Pt states she made a mistake by calling 911 because she was angry at SO. 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 Pt SO was arrested at the home and brought to [facility] [place]. Pt is very upset that SO is currently in [facility] and will have a \"no contact\" order put on him. Pt states she is going to [place] on Monday. 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 Pt BOBY .145 per [place] [employer]. There were many prescription medications included in pt possessions.     Pt changed in in scrubs, possessions recorded and removed from room. Pt on  hold.     "
son

## 2019-07-03 ENCOUNTER — APPOINTMENT (OUTPATIENT)
Dept: ORTHOPEDIC SURGERY | Facility: CLINIC | Age: 84
End: 2019-07-03

## 2019-07-03 DIAGNOSIS — G56.02 CARPAL TUNNEL SYNDROME, LEFT UPPER LIMB: ICD-10-CM

## 2020-01-16 NOTE — ASU PREOP CHECKLIST - LATEX ALLERGY
[FreeTextEntry1] : STAGE IA SCCA vulva/vagina (microscopic)\par WLE of the posterior left vulva 1/1/17.\par \par She has a history of bladder and colon cancers. \par \par Last seen 5/2019 after a CT scan demonstrated extensive adenopathy in left groin, pelvis and paraortic regions. \par \par FNA of a left groin node was positive for poorly differentiated squamous carcinoma. After extensive review of that and previous pathology, the impression of [facility] pathology department was that the recurrence was most compatible with a bladder cancer recurrence. \par \par She elected to go to [facility] [place] opinion. Their review suggested a recurrent vulvar cancer. She was treated with systemic chemotherapy (CDDP or Carbo + Taxol) and palliative RT to the left groin. \par \par Her most recent imaging shows obvious progression with new liver lesions and increased yamileth disease. \par \par She has been offered participation in a clinical trial at [facility] - an open label study of trastuzamab/pertuzemab, alectinib and atezolizumab in patient with advanced cancer. \par \par She presents today for a second opinion. \par \par She overall feels well. Had difficulty tolerating chemotherapy due to renal dysfunction and possibly neuropathy. \par 
no

## 2020-04-01 RX ADMIN — Medication 1: at 12:35

## 2020-04-01 RX ADMIN — Medication 2: at 12:50

## 2020-04-11 RX ADMIN — Medication 1: at 08:27

## 2020-04-18 ENCOUNTER — EMERGENCY (EMERGENCY)
Facility: HOSPITAL | Age: 85
LOS: 1 days | Discharge: HOME | End: 2020-04-18
Attending: EMERGENCY MEDICINE
Payer: MEDICARE

## 2020-04-18 VITALS
SYSTOLIC BLOOD PRESSURE: 112 MMHG | WEIGHT: 169.98 LBS | HEIGHT: 62 IN | HEART RATE: 95 BPM | TEMPERATURE: 103 F | RESPIRATION RATE: 20 BRPM | DIASTOLIC BLOOD PRESSURE: 65 MMHG | OXYGEN SATURATION: 98 %

## 2020-04-18 DIAGNOSIS — Z98.890 OTHER SPECIFIED POSTPROCEDURAL STATES: Chronic | ICD-10-CM

## 2020-04-18 DIAGNOSIS — Z98.42 CATARACT EXTRACTION STATUS, LEFT EYE: Chronic | ICD-10-CM

## 2020-04-18 DIAGNOSIS — Z98.41 CATARACT EXTRACTION STATUS, RIGHT EYE: Chronic | ICD-10-CM

## 2020-04-18 PROCEDURE — 99284 EMERGENCY DEPT VISIT MOD MDM: CPT

## 2020-04-18 PROCEDURE — 93010 ELECTROCARDIOGRAM REPORT: CPT

## 2020-04-18 RX ORDER — ACETAMINOPHEN 500 MG
650 TABLET ORAL ONCE
Refills: 0 | Status: DISCONTINUED | OUTPATIENT
Start: 2020-04-18 | End: 2020-04-19

## 2020-04-18 NOTE — ED PROVIDER NOTE - NEUROLOGICAL, MLM
A&O x2. CN 2-12 grossly intact, speech clear per interpretor, able to follow basic commands. Moving all 4 extremities equally

## 2020-04-18 NOTE — ED PROVIDER NOTE - PATIENT PORTAL LINK FT
You can access the FollowMyHealth Patient Portal offered by [facility] by registering at the following website: [email or web address]. By joining [employer]’s FollowMyHealth portal, you will also be able to view your health information using other applications (apps) compatible with our system.

## 2020-04-18 NOTE — ED PROVIDER NOTE - NSFOLLOWUPINSTRUCTIONS_ED_ALL_ED_FT
You were seen and evaluated for possible COVID 19 infection. Testing was performed--call  during regular business hours for results. We think you are safe for discharge and would like you to follow up in a two to three days with your doctor by phone or in person.   You should treat fevers by taking Tylenol as needed.     You should stay hydrated and increase the amount of fluid you drink.  Return to the Emergency Department if your shortness of breath becomes worse, you become weak, or new symptoms develop.    You should monitor your temperature and quarantine yourself away from others - particularly the elderly, ill, or immunosuppressed - for the next 14 days.    We recommend the below precautionary steps from now until 14 days from when you returned from your travel or date of your last known possible contact:  - Do not go to work, school, or public areas. Avoid using public transportation, ride-sharing, or taxis.  -Wear a mask whenever you are around other people.  -Avoid sharing personal household items. You should not share dishes, drinking glasses, cups, eating utensils, towels, or bedding with other people or pets in your home. After using these items, they should be washed thoroughly with soap and water.   - Avoid touching your eyes, nose, and mouth with your hands.  - Wash your hands often with soap and water for at least 15 to 20 seconds or clean your hands with an alcohol-based hand  that contains 60 to 95% alcohol, covering all surfaces of your hands and rubbing them together until they feel dry. Soap and water should be used preferentially if hands are visibly dirty.

## 2020-04-18 NOTE — ED PROVIDER NOTE - ATTENDING CONTRIBUTION TO CARE
Ce Cummings MD - Attending Physician: I have personally seen and examined this patient with the resident/fellow.  I have fully participated in the care of this patient. I have reviewed all pertinent clinical information, including history, physical exam, plan and the Resident/Fellow’s note and agree except as noted. See MDM

## 2020-04-18 NOTE — ED PROVIDER NOTE - PROGRESS NOTE DETAILS
D/w pt's son likely dx  of COVID, however pt is satting well on RA and sleeping comfortably, does not have admission criteria at this time. Return precautions discussed w/ son, and a different son will be coming to . D/w pt's son likely dx  of COVID, however pt is satting well on RA and sleeping comfortably, does not have admission criteria at this time. Return precautions discussed w/ son. Per son earliest he could come to pick pt up was 9am; informed him that pt is stable and ready for discharge now, may be waiting in waiting room depending on bed need.

## 2020-04-18 NOTE — ED PROVIDER NOTE - OBJECTIVE STATEMENT
hx DM   92%, SBP 80s, got 400cc NS andO2 with improvement. witnessed presyncopal episdoe while eating COVID last week was negative but was given HCQ?. 5 days of weakness and fever. 84 M [place] speaking, hx DM presented to ED b/c of presyncopal episode. Per EMS: on arrival, 92%, SBP 80s, got 400cc NS andO2 with improvement. witnessed presyncopal episode while eating COVID last week was negative but was given HCQ?. 5 days of weakness and fever.    On interview w/ interpretor pt states has been feeling weak, ill for 1 week, having fevers, states he was not tested for COVID. He denies feeling SoB, CP. State his wife has been sick for the last week. He is unable to give a clear history while speaking w/ interpretor.    [place] [id number] 84 M Cuban speaking, hx DM, HLD (but has not been taking it for the last few days) presented to ED b/c of presyncopal episode. Per EMS: on arrival, 92%, SBP 80s, got 400cc NS andO2 with improvement. witnessed presyncopal episode while eating was not tested but was given HCQ.  On phone discussion with son: around 8pm eating dinner, started to get pale, fall to floor from chair (was caught), the son started to 'blow on him' to try and wake him up put pulse ox on him was 91-92%. Took glipizide 5mg, azithromycin, tylenol, hydrochloroquine 400mg, tamsulosin. Ate a small amount of dinner. Did not have complete LoC, had a few seconds of unresponsiveness per the son. Is chronically incontinent wears a diaper unsure if pt had soiled himself.  Has had fever and weakness for 4 days. walks w/ a cane. Is not normally confused, requires assistance for ADLs. Was seen by a visiting nurse today and got some IV medications a "multivitamin" and a large dose of vitamin C. No hx of MI, dysrythmia, DVT/PE, or seizure.    On interview w/ interpretor pt states has been feeling weak, ill for 1 week, having fevers, states he was not tested for COVID. He denies feeling SoB, CP. State his wife has been sick for the last week. He is unable to give a clear history while speaking w/ interpretor.    Cuban [id number]  PMD Lawrence Dawn 84 M Icelandic speaking, hx DM, HLD (but has not been taking it for the last few days) presented to ED b/c of presyncopal episode. Per EMS: on arrival, 92%, SBP 80s, got 400cc NS andO2 with improvement. witnessed presyncopal episode while eating was not tested but was given HCQ.  On phone discussion with son: around 8pm eating dinner, started to get pale, fall to floor from chair (was caught), the son started to 'blow on him' to try and wake him up put pulse ox on him was 91-92%. Took glipizide 5mg, azithromycin (day 4), tylenol, hydrochloroquine 400mg (day 1), tamsulosin. Ate a small amount of dinner. Did not have complete LoC, had a few seconds of unresponsiveness per the son. Is chronically incontinent wears a diaper unsure if pt had soiled himself.  Has had fever and weakness for 4 days. walks w/ a cane. Is not normally confused, requires assistance for ADLs. Was seen by a visiting nurse today and got some IV medications a "multivitamin" and a large dose of vitamin C. No hx of MI, dysrythmia, DVT/PE, or seizure.    On interview w/ interpretor pt states has been feeling weak, ill for 1 week, having fevers, states he was not tested for COVID. He denies feeling SoB, CP. State his wife has been sick for the last week. He is unable to give a clear history while speaking w/ interpretor.    Icelandic [id number]  PMD Lawrence Dawn

## 2020-04-18 NOTE — ED PROVIDER NOTE - CLINICAL SUMMARY MEDICAL DECISION MAKING FREE TEXT BOX
83yo M presenting with presyncopal episode in setting of days of fever and poor PO intake, hypoxic and hypotensive on EMS arrival. Suspect CoV infection vs dysrythmia or hypoglycemia given pt on glipizide and poor PO intake w/ fevers. Will likely require admission for infectious and syncopal workup. 85yo M presenting with presyncopal episode in setting of days of fever and poor PO intake, hypoxic and hypotensive on EMS arrival. Suspect CoV infection vs dysrythmia or hypoglycemia given pt on glipizide and poor PO intake w/ fevers. Will likely require admission for infectious and syncopal workup.    Ce Cummings MD - Attending Physician: Pt here with reported flu-like illness, presyncopal episode prior to arrival. Here without hypoxia, BP wnl, well appearing. Oriented x 2 only, unclear if baseline. Likely COVID but will r/o other sources/causes

## 2020-04-19 VITALS
SYSTOLIC BLOOD PRESSURE: 104 MMHG | OXYGEN SATURATION: 97 % | RESPIRATION RATE: 20 BRPM | DIASTOLIC BLOOD PRESSURE: 55 MMHG | HEART RATE: 81 BPM

## 2020-04-19 LAB
ALBUMIN SERPL ELPH-MCNC: 3.6 G/DL — SIGNIFICANT CHANGE UP (ref 3.3–5)
ALP SERPL-CCNC: 65 U/L — SIGNIFICANT CHANGE UP (ref 40–120)
ALT FLD-CCNC: 21 U/L — SIGNIFICANT CHANGE UP (ref 10–45)
ANION GAP SERPL CALC-SCNC: 13 MMOL/L — SIGNIFICANT CHANGE UP (ref 5–17)
APPEARANCE UR: CLEAR — SIGNIFICANT CHANGE UP
APTT BLD: 34.3 SEC — SIGNIFICANT CHANGE UP (ref 27.5–36.3)
AST SERPL-CCNC: 54 U/L — HIGH (ref 10–40)
BASOPHILS # BLD AUTO: 0 K/UL — SIGNIFICANT CHANGE UP (ref 0–0.2)
BASOPHILS NFR BLD AUTO: 0 % — SIGNIFICANT CHANGE UP (ref 0–2)
BILIRUB SERPL-MCNC: 0.5 MG/DL — SIGNIFICANT CHANGE UP (ref 0.2–1.2)
BILIRUB UR-MCNC: NEGATIVE — SIGNIFICANT CHANGE UP
BUN SERPL-MCNC: 29 MG/DL — HIGH (ref 7–23)
CALCIUM SERPL-MCNC: 8.7 MG/DL — SIGNIFICANT CHANGE UP (ref 8.4–10.5)
CHLORIDE SERPL-SCNC: 95 MMOL/L — LOW (ref 96–108)
CO2 SERPL-SCNC: 25 MMOL/L — SIGNIFICANT CHANGE UP (ref 22–31)
COLOR SPEC: SIGNIFICANT CHANGE UP
CREAT SERPL-MCNC: 1.37 MG/DL — HIGH (ref 0.5–1.3)
CRP SERPL-MCNC: 8.54 MG/DL — HIGH (ref 0–0.4)
D DIMER BLD IA.RAPID-MCNC: 187 NG/ML DDU — SIGNIFICANT CHANGE UP
DIFF PNL FLD: ABNORMAL
EOSINOPHIL # BLD AUTO: 0 K/UL — SIGNIFICANT CHANGE UP (ref 0–0.5)
EOSINOPHIL NFR BLD AUTO: 0 % — SIGNIFICANT CHANGE UP (ref 0–6)
FERRITIN SERPL-MCNC: 291 NG/ML — SIGNIFICANT CHANGE UP (ref 30–400)
FIBRINOGEN PPP-MCNC: 599 MG/DL — HIGH (ref 350–510)
GAS PNL BLDV: SIGNIFICANT CHANGE UP
GLUCOSE SERPL-MCNC: 102 MG/DL — HIGH (ref 70–99)
GLUCOSE UR QL: NEGATIVE — SIGNIFICANT CHANGE UP
HCT VFR BLD CALC: 39.2 % — SIGNIFICANT CHANGE UP (ref 39–50)
HGB BLD-MCNC: 12.9 G/DL — LOW (ref 13–17)
INR BLD: 1.08 RATIO — SIGNIFICANT CHANGE UP (ref 0.88–1.16)
KETONES UR-MCNC: NEGATIVE — SIGNIFICANT CHANGE UP
LDH SERPL L TO P-CCNC: 272 U/L — HIGH (ref 50–242)
LEUKOCYTE ESTERASE UR-ACNC: NEGATIVE — SIGNIFICANT CHANGE UP
LYMPHOCYTES # BLD AUTO: 0.37 K/UL — LOW (ref 1–3.3)
LYMPHOCYTES # BLD AUTO: 6.9 % — LOW (ref 13–44)
MCHC RBC-ENTMCNC: 28 PG — SIGNIFICANT CHANGE UP (ref 27–34)
MCHC RBC-ENTMCNC: 32.9 GM/DL — SIGNIFICANT CHANGE UP (ref 32–36)
MCV RBC AUTO: 85.2 FL — SIGNIFICANT CHANGE UP (ref 80–100)
MONOCYTES # BLD AUTO: 0.28 K/UL — SIGNIFICANT CHANGE UP (ref 0–0.9)
MONOCYTES NFR BLD AUTO: 5.2 % — SIGNIFICANT CHANGE UP (ref 2–14)
NEUTROPHILS # BLD AUTO: 4.65 K/UL — SIGNIFICANT CHANGE UP (ref 1.8–7.4)
NEUTROPHILS NFR BLD AUTO: 85.3 % — HIGH (ref 43–77)
NITRITE UR-MCNC: NEGATIVE — SIGNIFICANT CHANGE UP
PH UR: 5.5 — SIGNIFICANT CHANGE UP (ref 5–8)
PLATELET # BLD AUTO: 89 K/UL — LOW (ref 150–400)
POTASSIUM SERPL-MCNC: 4.6 MMOL/L — SIGNIFICANT CHANGE UP (ref 3.5–5.3)
POTASSIUM SERPL-SCNC: 4.6 MMOL/L — SIGNIFICANT CHANGE UP (ref 3.5–5.3)
PROCALCITONIN SERPL-MCNC: 0.11 NG/ML — HIGH (ref 0.02–0.1)
PROT SERPL-MCNC: 7.1 G/DL — SIGNIFICANT CHANGE UP (ref 6–8.3)
PROT UR-MCNC: ABNORMAL
PROTHROM AB SERPL-ACNC: 12.5 SEC — SIGNIFICANT CHANGE UP (ref 10–12.9)
RBC # BLD: 4.6 M/UL — SIGNIFICANT CHANGE UP (ref 4.2–5.8)
RBC # FLD: 13.9 % — SIGNIFICANT CHANGE UP (ref 10.3–14.5)
SARS-COV-2 RNA SPEC QL NAA+PROBE: DETECTED
SODIUM SERPL-SCNC: 133 MMOL/L — LOW (ref 135–145)
SP GR SPEC: 1.01 — SIGNIFICANT CHANGE UP (ref 1.01–1.02)
TROPONIN T, HIGH SENSITIVITY RESULT: 28 NG/L — SIGNIFICANT CHANGE UP (ref 0–51)
TROPONIN T, HIGH SENSITIVITY RESULT: 31 NG/L — SIGNIFICANT CHANGE UP (ref 0–51)
UROBILINOGEN FLD QL: NEGATIVE — SIGNIFICANT CHANGE UP
WBC # BLD: 5.34 K/UL — SIGNIFICANT CHANGE UP (ref 3.8–10.5)
WBC # FLD AUTO: 5.34 K/UL — SIGNIFICANT CHANGE UP (ref 3.8–10.5)

## 2020-04-19 PROCEDURE — 81001 URINALYSIS AUTO W/SCOPE: CPT

## 2020-04-19 PROCEDURE — 82803 BLOOD GASES ANY COMBINATION: CPT

## 2020-04-19 PROCEDURE — 36415 COLL VENOUS BLD VENIPUNCTURE: CPT

## 2020-04-19 PROCEDURE — 71045 X-RAY EXAM CHEST 1 VIEW: CPT | Mod: 26

## 2020-04-19 PROCEDURE — 84132 ASSAY OF SERUM POTASSIUM: CPT

## 2020-04-19 PROCEDURE — 85379 FIBRIN DEGRADATION QUANT: CPT

## 2020-04-19 PROCEDURE — 82435 ASSAY OF BLOOD CHLORIDE: CPT

## 2020-04-19 PROCEDURE — 85610 PROTHROMBIN TIME: CPT

## 2020-04-19 PROCEDURE — 83615 LACTATE (LD) (LDH) ENZYME: CPT

## 2020-04-19 PROCEDURE — 84295 ASSAY OF SERUM SODIUM: CPT

## 2020-04-19 PROCEDURE — 87635 SARS-COV-2 COVID-19 AMP PRB: CPT

## 2020-04-19 PROCEDURE — 80053 COMPREHEN METABOLIC PANEL: CPT

## 2020-04-19 PROCEDURE — 93005 ELECTROCARDIOGRAM TRACING: CPT

## 2020-04-19 PROCEDURE — 87040 BLOOD CULTURE FOR BACTERIA: CPT

## 2020-04-19 PROCEDURE — 85014 HEMATOCRIT: CPT

## 2020-04-19 PROCEDURE — 86140 C-REACTIVE PROTEIN: CPT

## 2020-04-19 PROCEDURE — 84145 PROCALCITONIN (PCT): CPT

## 2020-04-19 PROCEDURE — 84484 ASSAY OF TROPONIN QUANT: CPT

## 2020-04-19 PROCEDURE — 82330 ASSAY OF CALCIUM: CPT

## 2020-04-19 PROCEDURE — 82728 ASSAY OF FERRITIN: CPT

## 2020-04-19 PROCEDURE — 85027 COMPLETE CBC AUTOMATED: CPT

## 2020-04-19 PROCEDURE — 83605 ASSAY OF LACTIC ACID: CPT

## 2020-04-19 PROCEDURE — 85730 THROMBOPLASTIN TIME PARTIAL: CPT

## 2020-04-19 PROCEDURE — 99283 EMERGENCY DEPT VISIT LOW MDM: CPT | Mod: 25

## 2020-04-19 PROCEDURE — 87086 URINE CULTURE/COLONY COUNT: CPT

## 2020-04-19 PROCEDURE — 85384 FIBRINOGEN ACTIVITY: CPT

## 2020-04-19 PROCEDURE — 71045 X-RAY EXAM CHEST 1 VIEW: CPT

## 2020-04-19 PROCEDURE — 82947 ASSAY GLUCOSE BLOOD QUANT: CPT

## 2020-04-19 RX ORDER — SODIUM CHLORIDE 9 MG/ML
500 INJECTION INTRAMUSCULAR; INTRAVENOUS; SUBCUTANEOUS ONCE
Refills: 0 | Status: COMPLETED | OUTPATIENT
Start: 2020-04-19 | End: 2020-04-19

## 2020-04-19 RX ORDER — ACETAMINOPHEN 500 MG
325 TABLET ORAL ONCE
Refills: 0 | Status: COMPLETED | OUTPATIENT
Start: 2020-04-19 | End: 2020-04-19

## 2020-04-19 RX ADMIN — Medication 325 MILLIGRAM(S): at 00:33

## 2020-04-19 RX ADMIN — SODIUM CHLORIDE 500 MILLILITER(S): 9 INJECTION INTRAMUSCULAR; INTRAVENOUS; SUBCUTANEOUS at 01:11

## 2020-04-19 NOTE — ED ADULT NURSE NOTE - OBJECTIVE STATEMENT
85y/o M Hungarian speaking coming to the ED c/o of syncopal episode & fever. As per EMS, pt had witnessed syncopal episode (unknown LOC), hypoxic to 92%, and hypotensive to 80s with fevers and was given 400cc NS. On arrival, pt saturating 95% RA and /54. Pt denies any CP/SOB/N/V/D. Pt unable to explain why he is here and is unaware of the date but able to say he is in the hospital. Pt denies syncopal episode and denies any dizziness. MD spoke with son, explaining syncopal episode @ dinner and was caught prior to hitting floor, pt has been having generalized weakness and fevers x4days. As per son, pt started on hydroxychlorquine & zithromax today. As per son, pt normally is unsteady with cane and not normally confused. On exam, abdomen soft, nontender, nondistended. Heart monitor placed, NSR. EKG completed. IV placed. Labs collected and sent.

## 2020-04-20 LAB
CULTURE RESULTS: SIGNIFICANT CHANGE UP
GRAM STN FLD: SIGNIFICANT CHANGE UP
SPECIMEN SOURCE: SIGNIFICANT CHANGE UP

## 2020-04-20 NOTE — ED POST DISCHARGE NOTE - DETAILS
04/20/20 approx 11:35pm, patient contacted, results given over the phone to son Dale. Advised patient to return to ED asap for further evaluation. Son stated he would be bringing father to ED shortly. August DUKES 4/22/2020: patient currently admitted no further intervention required -Kyleigh Nielsen PA-C

## 2020-04-21 ENCOUNTER — INPATIENT (INPATIENT)
Facility: HOSPITAL | Age: 85
LOS: 8 days | DRG: 177 | End: 2020-04-30
Attending: STUDENT IN AN ORGANIZED HEALTH CARE EDUCATION/TRAINING PROGRAM | Admitting: HOSPITALIST
Payer: MEDICARE

## 2020-04-21 VITALS
TEMPERATURE: 101 F | HEIGHT: 62 IN | HEART RATE: 64 BPM | RESPIRATION RATE: 18 BRPM | SYSTOLIC BLOOD PRESSURE: 132 MMHG | OXYGEN SATURATION: 97 % | DIASTOLIC BLOOD PRESSURE: 82 MMHG

## 2020-04-21 DIAGNOSIS — Z71.89 OTHER SPECIFIED COUNSELING: ICD-10-CM

## 2020-04-21 DIAGNOSIS — I10 ESSENTIAL (PRIMARY) HYPERTENSION: ICD-10-CM

## 2020-04-21 DIAGNOSIS — R79.89 OTHER SPECIFIED ABNORMAL FINDINGS OF BLOOD CHEMISTRY: ICD-10-CM

## 2020-04-21 DIAGNOSIS — U07.1 COVID-19: ICD-10-CM

## 2020-04-21 DIAGNOSIS — Z98.41 CATARACT EXTRACTION STATUS, RIGHT EYE: Chronic | ICD-10-CM

## 2020-04-21 DIAGNOSIS — E11.9 TYPE 2 DIABETES MELLITUS WITHOUT COMPLICATIONS: ICD-10-CM

## 2020-04-21 DIAGNOSIS — Z98.890 OTHER SPECIFIED POSTPROCEDURAL STATES: Chronic | ICD-10-CM

## 2020-04-21 DIAGNOSIS — Z98.42 CATARACT EXTRACTION STATUS, LEFT EYE: Chronic | ICD-10-CM

## 2020-04-21 DIAGNOSIS — G93.40 ENCEPHALOPATHY, UNSPECIFIED: ICD-10-CM

## 2020-04-21 DIAGNOSIS — Z29.9 ENCOUNTER FOR PROPHYLACTIC MEASURES, UNSPECIFIED: ICD-10-CM

## 2020-04-21 DIAGNOSIS — R19.7 DIARRHEA, UNSPECIFIED: ICD-10-CM

## 2020-04-21 LAB
ALBUMIN SERPL ELPH-MCNC: 3.2 G/DL — LOW (ref 3.3–5)
ALBUMIN SERPL ELPH-MCNC: 3.6 G/DL — SIGNIFICANT CHANGE UP (ref 3.3–5)
ALP SERPL-CCNC: 58 U/L — SIGNIFICANT CHANGE UP (ref 40–120)
ALP SERPL-CCNC: 62 U/L — SIGNIFICANT CHANGE UP (ref 40–120)
ALT FLD-CCNC: 22 U/L — SIGNIFICANT CHANGE UP (ref 10–45)
ALT FLD-CCNC: 25 U/L — SIGNIFICANT CHANGE UP (ref 10–45)
ANION GAP SERPL CALC-SCNC: 13 MMOL/L — SIGNIFICANT CHANGE UP (ref 5–17)
ANION GAP SERPL CALC-SCNC: 16 MMOL/L — SIGNIFICANT CHANGE UP (ref 5–17)
AST SERPL-CCNC: 66 U/L — HIGH (ref 10–40)
AST SERPL-CCNC: 68 U/L — HIGH (ref 10–40)
BASE EXCESS BLDV CALC-SCNC: 0.1 MMOL/L — SIGNIFICANT CHANGE UP (ref -2–2)
BASOPHILS # BLD AUTO: 0 K/UL — SIGNIFICANT CHANGE UP (ref 0–0.2)
BASOPHILS NFR BLD AUTO: 0 % — SIGNIFICANT CHANGE UP (ref 0–2)
BILIRUB SERPL-MCNC: 0.5 MG/DL — SIGNIFICANT CHANGE UP (ref 0.2–1.2)
BILIRUB SERPL-MCNC: 0.6 MG/DL — SIGNIFICANT CHANGE UP (ref 0.2–1.2)
BUN SERPL-MCNC: 17 MG/DL — SIGNIFICANT CHANGE UP (ref 7–23)
BUN SERPL-MCNC: 18 MG/DL — SIGNIFICANT CHANGE UP (ref 7–23)
CA-I SERPL-SCNC: 1.15 MMOL/L — SIGNIFICANT CHANGE UP (ref 1.12–1.3)
CALCIUM SERPL-MCNC: 8.5 MG/DL — SIGNIFICANT CHANGE UP (ref 8.4–10.5)
CALCIUM SERPL-MCNC: 9 MG/DL — SIGNIFICANT CHANGE UP (ref 8.4–10.5)
CHLORIDE BLDV-SCNC: 100 MMOL/L — SIGNIFICANT CHANGE UP (ref 96–108)
CHLORIDE SERPL-SCNC: 97 MMOL/L — SIGNIFICANT CHANGE UP (ref 96–108)
CHLORIDE SERPL-SCNC: 98 MMOL/L — SIGNIFICANT CHANGE UP (ref 96–108)
CK SERPL-CCNC: 266 U/L — HIGH (ref 30–200)
CO2 BLDV-SCNC: 26 MMOL/L — SIGNIFICANT CHANGE UP (ref 22–30)
CO2 SERPL-SCNC: 20 MMOL/L — LOW (ref 22–31)
CO2 SERPL-SCNC: 25 MMOL/L — SIGNIFICANT CHANGE UP (ref 22–31)
CREAT SERPL-MCNC: 1.03 MG/DL — SIGNIFICANT CHANGE UP (ref 0.5–1.3)
CREAT SERPL-MCNC: 1.13 MG/DL — SIGNIFICANT CHANGE UP (ref 0.5–1.3)
CRP SERPL-MCNC: 8.77 MG/DL — HIGH (ref 0–0.4)
CULTURE RESULTS: SIGNIFICANT CHANGE UP
D DIMER BLD IA.RAPID-MCNC: 248 NG/ML DDU — HIGH
EOSINOPHIL # BLD AUTO: 0 K/UL — SIGNIFICANT CHANGE UP (ref 0–0.5)
EOSINOPHIL NFR BLD AUTO: 0 % — SIGNIFICANT CHANGE UP (ref 0–6)
ERYTHROCYTE [SEDIMENTATION RATE] IN BLOOD: 95 MM/HR — HIGH (ref 0–20)
FERRITIN SERPL-MCNC: 403 NG/ML — HIGH (ref 30–400)
GAS PNL BLDV: 130 MMOL/L — LOW (ref 135–145)
GAS PNL BLDV: SIGNIFICANT CHANGE UP
GAS PNL BLDV: SIGNIFICANT CHANGE UP
GLUCOSE BLDC GLUCOMTR-MCNC: 132 MG/DL — HIGH (ref 70–99)
GLUCOSE BLDV-MCNC: 137 MG/DL — HIGH (ref 70–99)
GLUCOSE SERPL-MCNC: 134 MG/DL — HIGH (ref 70–99)
GLUCOSE SERPL-MCNC: 147 MG/DL — HIGH (ref 70–99)
GRAM STN FLD: SIGNIFICANT CHANGE UP
GRAM STN FLD: SIGNIFICANT CHANGE UP
HCO3 BLDV-SCNC: 24 MMOL/L — SIGNIFICANT CHANGE UP (ref 21–29)
HCT VFR BLD CALC: 37.7 % — LOW (ref 39–50)
HCT VFR BLD CALC: 40.6 % — SIGNIFICANT CHANGE UP (ref 39–50)
HCT VFR BLDA CALC: 39 % — SIGNIFICANT CHANGE UP (ref 39–50)
HGB BLD CALC-MCNC: 12.7 G/DL — LOW (ref 13–17)
HGB BLD-MCNC: 12.3 G/DL — LOW (ref 13–17)
HGB BLD-MCNC: 12.8 G/DL — LOW (ref 13–17)
IMM GRANULOCYTES NFR BLD AUTO: 0.2 % — SIGNIFICANT CHANGE UP (ref 0–1.5)
LACTATE BLDV-MCNC: 1.6 MMOL/L — SIGNIFICANT CHANGE UP (ref 0.7–2)
LDH SERPL L TO P-CCNC: 353 U/L — HIGH (ref 50–242)
LYMPHOCYTES # BLD AUTO: 1.15 K/UL — SIGNIFICANT CHANGE UP (ref 1–3.3)
LYMPHOCYTES # BLD AUTO: 27.2 % — SIGNIFICANT CHANGE UP (ref 13–44)
MAGNESIUM SERPL-MCNC: 2.3 MG/DL — SIGNIFICANT CHANGE UP (ref 1.6–2.6)
MCHC RBC-ENTMCNC: 27.2 PG — SIGNIFICANT CHANGE UP (ref 27–34)
MCHC RBC-ENTMCNC: 27.6 PG — SIGNIFICANT CHANGE UP (ref 27–34)
MCHC RBC-ENTMCNC: 31.5 GM/DL — LOW (ref 32–36)
MCHC RBC-ENTMCNC: 32.6 GM/DL — SIGNIFICANT CHANGE UP (ref 32–36)
MCV RBC AUTO: 84.5 FL — SIGNIFICANT CHANGE UP (ref 80–100)
MCV RBC AUTO: 86.2 FL — SIGNIFICANT CHANGE UP (ref 80–100)
MONOCYTES # BLD AUTO: 0.22 K/UL — SIGNIFICANT CHANGE UP (ref 0–0.9)
MONOCYTES NFR BLD AUTO: 5.2 % — SIGNIFICANT CHANGE UP (ref 2–14)
NEUTROPHILS # BLD AUTO: 2.85 K/UL — SIGNIFICANT CHANGE UP (ref 1.8–7.4)
NEUTROPHILS NFR BLD AUTO: 67.4 % — SIGNIFICANT CHANGE UP (ref 43–77)
NRBC # BLD: 0 /100 WBCS — SIGNIFICANT CHANGE UP (ref 0–0)
NRBC # BLD: 0 /100 WBCS — SIGNIFICANT CHANGE UP (ref 0–0)
OTHER CELLS CSF MANUAL: 15 ML/DL — LOW (ref 18–22)
PCO2 BLDV: 40 MMHG — SIGNIFICANT CHANGE UP (ref 35–50)
PH BLDV: 7.4 — SIGNIFICANT CHANGE UP (ref 7.35–7.45)
PLATELET # BLD AUTO: 103 K/UL — LOW (ref 150–400)
PLATELET # BLD AUTO: 106 K/UL — LOW (ref 150–400)
PO2 BLDV: 52 MMHG — HIGH (ref 25–45)
POTASSIUM BLDV-SCNC: 5 MMOL/L — SIGNIFICANT CHANGE UP (ref 3.5–5.3)
POTASSIUM SERPL-MCNC: 4.5 MMOL/L — SIGNIFICANT CHANGE UP (ref 3.5–5.3)
POTASSIUM SERPL-MCNC: 4.5 MMOL/L — SIGNIFICANT CHANGE UP (ref 3.5–5.3)
POTASSIUM SERPL-SCNC: 4.5 MMOL/L — SIGNIFICANT CHANGE UP (ref 3.5–5.3)
POTASSIUM SERPL-SCNC: 4.5 MMOL/L — SIGNIFICANT CHANGE UP (ref 3.5–5.3)
PROCALCITONIN SERPL-MCNC: 0.1 NG/ML — SIGNIFICANT CHANGE UP (ref 0.02–0.1)
PROT SERPL-MCNC: 6.4 G/DL — SIGNIFICANT CHANGE UP (ref 6–8.3)
PROT SERPL-MCNC: 7.1 G/DL — SIGNIFICANT CHANGE UP (ref 6–8.3)
RBC # BLD: 4.46 M/UL — SIGNIFICANT CHANGE UP (ref 4.2–5.8)
RBC # BLD: 4.71 M/UL — SIGNIFICANT CHANGE UP (ref 4.2–5.8)
RBC # FLD: 13.6 % — SIGNIFICANT CHANGE UP (ref 10.3–14.5)
RBC # FLD: 13.6 % — SIGNIFICANT CHANGE UP (ref 10.3–14.5)
SAO2 % BLDV: 86 % — SIGNIFICANT CHANGE UP (ref 67–88)
SODIUM SERPL-SCNC: 133 MMOL/L — LOW (ref 135–145)
SODIUM SERPL-SCNC: 136 MMOL/L — SIGNIFICANT CHANGE UP (ref 135–145)
SPECIMEN SOURCE: SIGNIFICANT CHANGE UP
WBC # BLD: 3.91 K/UL — SIGNIFICANT CHANGE UP (ref 3.8–10.5)
WBC # BLD: 4.23 K/UL — SIGNIFICANT CHANGE UP (ref 3.8–10.5)
WBC # FLD AUTO: 3.91 K/UL — SIGNIFICANT CHANGE UP (ref 3.8–10.5)
WBC # FLD AUTO: 4.23 K/UL — SIGNIFICANT CHANGE UP (ref 3.8–10.5)

## 2020-04-21 PROCEDURE — ZZZZZ: CPT

## 2020-04-21 PROCEDURE — 93010 ELECTROCARDIOGRAM REPORT: CPT

## 2020-04-21 PROCEDURE — 71045 X-RAY EXAM CHEST 1 VIEW: CPT | Mod: 26

## 2020-04-21 PROCEDURE — 99285 EMERGENCY DEPT VISIT HI MDM: CPT

## 2020-04-21 RX ORDER — INSULIN LISPRO 100/ML
VIAL (ML) SUBCUTANEOUS
Refills: 0 | Status: DISCONTINUED | OUTPATIENT
Start: 2020-04-21 | End: 2020-04-21

## 2020-04-21 RX ORDER — HYDROXYCHLOROQUINE SULFATE 200 MG
TABLET ORAL
Refills: 0 | Status: COMPLETED | OUTPATIENT
Start: 2020-04-21 | End: 2020-04-22

## 2020-04-21 RX ORDER — DEXTROSE 50 % IN WATER 50 %
25 SYRINGE (ML) INTRAVENOUS ONCE
Refills: 0 | Status: DISCONTINUED | OUTPATIENT
Start: 2020-04-21 | End: 2020-04-21

## 2020-04-21 RX ORDER — INSULIN LISPRO 100/ML
VIAL (ML) SUBCUTANEOUS AT BEDTIME
Refills: 0 | Status: DISCONTINUED | OUTPATIENT
Start: 2020-04-21 | End: 2020-04-21

## 2020-04-21 RX ORDER — GLUCAGON INJECTION, SOLUTION 0.5 MG/.1ML
1 INJECTION, SOLUTION SUBCUTANEOUS ONCE
Refills: 0 | Status: DISCONTINUED | OUTPATIENT
Start: 2020-04-21 | End: 2020-04-30

## 2020-04-21 RX ORDER — HYDROXYCHLOROQUINE SULFATE 200 MG
400 TABLET ORAL EVERY 24 HOURS
Refills: 0 | Status: COMPLETED | OUTPATIENT
Start: 2020-04-21 | End: 2020-04-22

## 2020-04-21 RX ORDER — ASPIRIN/CALCIUM CARB/MAGNESIUM 324 MG
81 TABLET ORAL DAILY
Refills: 0 | Status: DISCONTINUED | OUTPATIENT
Start: 2020-04-21 | End: 2020-04-30

## 2020-04-21 RX ORDER — ENOXAPARIN SODIUM 100 MG/ML
40 INJECTION SUBCUTANEOUS
Refills: 0 | Status: DISCONTINUED | OUTPATIENT
Start: 2020-04-21 | End: 2020-04-30

## 2020-04-21 RX ORDER — ONDANSETRON 8 MG/1
4 TABLET, FILM COATED ORAL EVERY 6 HOURS
Refills: 0 | Status: DISCONTINUED | OUTPATIENT
Start: 2020-04-21 | End: 2020-04-22

## 2020-04-21 RX ORDER — DEXTROSE 50 % IN WATER 50 %
12.5 SYRINGE (ML) INTRAVENOUS ONCE
Refills: 0 | Status: DISCONTINUED | OUTPATIENT
Start: 2020-04-21 | End: 2020-04-21

## 2020-04-21 RX ORDER — ACETAMINOPHEN 500 MG
650 TABLET ORAL ONCE
Refills: 0 | Status: COMPLETED | OUTPATIENT
Start: 2020-04-21 | End: 2020-04-21

## 2020-04-21 RX ORDER — DEXTROSE 50 % IN WATER 50 %
15 SYRINGE (ML) INTRAVENOUS ONCE
Refills: 0 | Status: DISCONTINUED | OUTPATIENT
Start: 2020-04-21 | End: 2020-04-21

## 2020-04-21 RX ORDER — ACETAMINOPHEN 500 MG
650 TABLET ORAL EVERY 4 HOURS
Refills: 0 | Status: DISCONTINUED | OUTPATIENT
Start: 2020-04-21 | End: 2020-04-30

## 2020-04-21 RX ORDER — SODIUM CHLORIDE 9 MG/ML
1000 INJECTION, SOLUTION INTRAVENOUS
Refills: 0 | Status: DISCONTINUED | OUTPATIENT
Start: 2020-04-21 | End: 2020-04-22

## 2020-04-21 RX ORDER — LINAGLIPTIN 5 MG/1
1 TABLET, FILM COATED ORAL
Qty: 0 | Refills: 0 | DISCHARGE

## 2020-04-21 RX ADMIN — Medication 650 MILLIGRAM(S): at 04:57

## 2020-04-21 RX ADMIN — Medication 400 MILLIGRAM(S): at 12:10

## 2020-04-21 RX ADMIN — ENOXAPARIN SODIUM 40 MILLIGRAM(S): 100 INJECTION SUBCUTANEOUS at 18:41

## 2020-04-21 RX ADMIN — Medication 81 MILLIGRAM(S): at 12:09

## 2020-04-21 NOTE — H&P ADULT - PROBLEM SELECTOR PLAN 1
#Viral Pneumonia 2/2 Novel Coronavirus Infection (COVID-19):  --Closely monitor respiratory status and escalate O2 therapy as necessary to maintain SpO2 92-96%; per institution policy, will escalate NC -> 100% NRB -> intubation (avoiding BiPAP/CPAP/High Flow given risk of aerosolizing virus). Will use continuous oximetry monitoring if/when on NRB.  --Continue Plaquenil x 2 more days given patient had 3 days of Plaquenil as an outpatient. EKG reviewed on admission and QTc < 500 msec. No need for further cardiac monitoring.  --Acetaminophen 650 mg PO q4h PRN fever. Limit use of NSAIDs.  --HFA albuterol Q6 hour PRN via MDI. Would avoid nebulized preparations to limit risk of aerosol formation.  --Defer systemic steroids/interleukin inhibitor at this time; would consider if inflammatory markers are elevated and patient has worsening hypoxia.  --Monitor daily (or as needed): CBC, CMP, Mg, Phos, CPK  --Consider monitoring every 3 days: procalcitonin, CRP, ESR, ferritin, coags, D-dimer, LDH  --Consider prone positioning if high O2 requirements and if patient able to tolerate  --Maintain isolation precautions with contact/airborne  --Limit IVF given risk of ARDS

## 2020-04-21 NOTE — ED ADULT NURSE REASSESSMENT NOTE - NS ED NURSE REASSESS COMMENT FT1
Report received from DERIC Uribe. Pt resting comfortably in bed at this time. Assisted to bathroom and repositioned back into bed. Bed locked and lowered. Appropriate side rails raised. Comfort and safety measures maintained.

## 2020-04-21 NOTE — ED PROVIDER NOTE - ATTENDING CONTRIBUTION TO CARE
84M, pmh DM II, hld, recently seen at [facility] and found to be covid-19 positive, biba after being called back for + blood cultures. pt with sob, cough, fever, ams x 1 to 1 1/2 weeks. pt denies any complaints at this time. family reports worsenign ams.    PE: NAD, NCAT, MMM, Trachea midline, Normal conjunctiva, lungs CTAB, S1/S2 RRR, Normal perfusion, 2+ radial pulses bilat, Abdomen Soft, NTND, No rebound/guarding, No LE edema, No deformity of extremities, No rashes,  No focal motor or sensory deficits.     Gram + rods in one blood culture bottle, other neg. Likely contaminant. Will resend. Check labs. Pt with O2 sat 91% RA, worsening ams. Plan to admit. - Jin Pham MD

## 2020-04-21 NOTE — H&P ADULT - PROBLEM SELECTOR PLAN 7
#DVT Prophylaxis  --Given increased hypercoagulable state in COVID-19 patients, will start: BMI < 30 and CrCl > 15ml/min:  --Will consider need for extended prophylaxis prior to discharge based on D-Dimer and calculated VTE risk.

## 2020-04-21 NOTE — ED PROVIDER NOTE - PHYSICAL EXAMINATION
GENERAL: NAD, well-developed, pleasant elderly man, not appropriately answering questions  HEAD:  Atraumatic, Normocephalic  EYES: EOMI, conjunctiva and sclera clear  NECK: Supple, No JVD  CHEST/LUNG: Clear to auscultation bilaterally; No wheeze, ronchi or rales, no increased WOB  HEART: Regular rate and rhythm; No murmurs, rubs, or gallops  ABDOMEN: Soft, Nontender, Nondistended; Bowel sounds present  EXTREMITIES:  2+ Peripheral Pulses, No clubbing, cyanosis, or edema  PSYCH: AAOx0  NEUROLOGY: +voluntary movements, not answering questions appropriately, not following commands  SKIN: No rashes or lesions

## 2020-04-21 NOTE — PROVIDER CONTACT NOTE (OTHER) - ASSESSMENT
Pt has no s/s of hypo or hyperglycemia, pt. last glucose in AM (4/21) was 132, fingersticks were d/c'd, A1C to be drawn in AM (4/22).

## 2020-04-21 NOTE — ED PROVIDER NOTE - OBJECTIVE STATEMENT
84 M [place] speaking, DM2 and HLD, known COVID-19 positive, brought by EMS from home for blood culture growing gram positive rods in 1/4 bottles. Patient has 7 days of SOB, cough, fevers, weakness and AMS. Presented to ED for presyncope on 4/18, at which time cultures were drawn. Patient satting 92% on RA. Per sons, patient has been more confused for about 3 days: does not appropriately answer questions, defecates on himself. Patient was on his 6th day of azithromycin and plaquenil, as prescribed by his PMD.

## 2020-04-21 NOTE — ED ADULT NURSE REASSESSMENT NOTE - NS ED NURSE REASSESS COMMENT FT1
Spoke to pt son on phone, used for translation. Son says patient seems "not there" and was not making much sense on the phone. Pt instructed to keep oxygen in nose, and to not pull on IV. Linens changed and pt repositioned for comfort. Bed locked and lowered. Comfort and safety measures maintained.

## 2020-04-21 NOTE — H&P ADULT - NSHPLABSRESULTS_GEN_ALL_CORE
LABS:                        12.3   4.23  )-----------( 103      ( 21 Apr 2020 02:37 )             37.7     04-21    133<L>  |  97  |  18  ----------------------------<  147<H>  4.5   |  20<L>  |  1.03    Ca    8.5      21 Apr 2020 02:37    TPro  6.4  /  Alb  3.2<L>  /  TBili  0.5  /  DBili  x   /  AST  66<H>  /  ALT  22  /  AlkPhos  62  04-21    LIVER FUNCTIONS - ( 21 Apr 2020 02:37 )  Alb: 3.2 g/dL / Pro: 6.4 g/dL / ALK PHOS: 62 U/L / ALT: 22 U/L / AST: 66 U/L / GGT: x             Qtc 440        Imaging:  < from: Xray Chest 1 View- PORTABLE-Urgent (04.21.20 @ 02:50) >    ******PRELIMINARY REPORT******    ******PRELIMINARY REPORT******            INTERPRETATION:  Faint bilateral patchy opacities, suggestive of infection.

## 2020-04-21 NOTE — ED ADULT NURSE NOTE - OBJECTIVE STATEMENT
c/o abnormal blood cultures. As per EMS, "Pt had a positive blood culture and was told to come into the ER so he called 911." Patient is Central African speaking only. As per  phone ID# [id number] , Pt states, "I was here a few days ago and you guys called me to come in." As per , " pt denies any discomfort at present. Pt denies headache, blurred vision, lightheadedness, dizziness, chest pain, SOB, abdominal pain, N/V/D urinary symptoms, numbness and tingling. Pt is resting comfortably, satting 93% on RA. Speaking in full complete sentence. A&Ox2 person and place.

## 2020-04-21 NOTE — ED ADULT NURSE NOTE - CHPI ED NUR SYMPTOMS NEG
no chills/no nausea/no dizziness/no vomiting/no pain/no decreased eating/drinking/no weakness/no tingling

## 2020-04-21 NOTE — PROGRESS NOTE ADULT - SUBJECTIVE AND OBJECTIVE BOX
Leanne Ace MD  Pager  [phone] [phone]  [phone] (nights and weekends)    SUBJECTIVE:  pt is awake, eating, states he "feels okay"  currently on 2L NC  FSBG acceptable, no insulin use       MEDICATIONS  (STANDING):  aspirin  chewable 81 milliGRAM(s) Oral daily  dextrose 5%. 1000 milliLiter(s) (50 mL/Hr) IV Continuous <Continuous>  hydroxychloroquine   Oral   hydroxychloroquine 400 milliGRAM(s) Oral every 24 hours    MEDICATIONS  (PRN):  acetaminophen   Tablet .. 650 milliGRAM(s) Oral every 4 hours PRN Temp greater or equal to 38.5C (101.3F)  benzonatate 100 milliGRAM(s) Oral three times a day PRN Cough  glucagon  Injectable 1 milliGRAM(s) IntraMuscular once PRN Glucose LESS THAN 70 milligrams/deciliter  ondansetron Injectable 4 milliGRAM(s) IV Push every 6 hours PRN Nausea and/or Vomiting      Vital Signs Last 24 Hrs  T(C): 36.6 (21 Apr 2020 11:17), Max: 38.7 (21 Apr 2020 04:56)  T(F): 97.9 (21 Apr 2020 11:17), Max: 101.6 (21 Apr 2020 04:56)  HR: 83 (21 Apr 2020 11:17) (64 - 92)  BP: 120/73 (21 Apr 2020 11:17) (116/89 - 139/61)  BP(mean): --  RR: 22 (21 Apr 2020 11:17) (18 - 23)  SpO2: 83% (21 Apr 2020 11:17) (83% - 97%)    CAPILLARY BLOOD GLUCOSE  132 (21 Apr 2020 11:57)  133 (21 Apr 2020 07:45)      POCT Blood Glucose.: 132 mg/dL (21 Apr 2020 07:55)    I&O's Summary    21 Apr 2020 07:01  -  21 Apr 2020 16:41  --------------------------------------------------------  IN: 480 mL / OUT: 1 mL / NET: 479 mL        PHYSICAL EXAM:  GENERAL: Looks stated age, NAD  CARDIOVASCULAR: Normal S1, S2  PULMONARY: on NC, moving air bilaterally   GI: Abdomen non-distended, soft, Nontender.  Bowel sounds present  MSK/Ext:  No leg edema.  No calf tenderness bilaterally  PSYCH: Normal Affect. Awake, alert, answering questions       LABS:                        12.8   3.91  )-----------( 106      ( 21 Apr 2020 06:40 )             40.6     04-21    136  |  98  |  17  ----------------------------<  134<H>  4.5   |  25  |  1.13    Ca    9.0      21 Apr 2020 06:36  Mg     2.3     04-21    TPro  7.1  /  Alb  3.6  /  TBili  0.6  /  DBili  x   /  AST  68<H>  /  ALT  25  /  AlkPhos  58  04-21      CARDIAC MARKERS ( 21 Apr 2020 06:36 )  x     / x     / 266 U/L / x     / x                RADIOLOGY & ADDITIONAL TESTS:

## 2020-04-21 NOTE — ED PROVIDER NOTE - CLINICAL SUMMARY MEDICAL DECISION MAKING FREE TEXT BOX
84 M with known COVID pneumonia, satting 92% on RA, presenting from home for blood culture growing gram positive rods in 1/4 cultures on 4/18. Will f/u repeat blood cultures. Will admit to medicine, given acute encephalopathy from COVID-19 infection.

## 2020-04-21 NOTE — H&P ADULT - PROBLEM SELECTOR PLAN 2
#GNR Bacteremia in 1/4 cultures from 4/19  - patient completed course of azithromycin prior to positive culture result  - followup repeat cultures  - start antibiotics as needed pending repeat cultures

## 2020-04-21 NOTE — H&P ADULT - PROBLEM SELECTOR PLAN 3
# Altered mental status  - likely in setting of infection  - frequent orientation  - consider CTH if worsens given hypercoagulable state with COVID infection

## 2020-04-21 NOTE — H&P ADULT - HISTORY OF PRESENT ILLNESS
Due to COVID pandemic, history and physical obtained via chart review and discussion with his sons.    84 M Brazilian speaking, DM2 and hyperlipidemia, known COVID-19 positive with recent ED visit, brought by EMS from home for blood culture growing gram positive rods in 1/4 bottles.     Patient has 7 days of SOB, cough, fevers, weakness and AMS. Presented to ED for presyncope on 4/18, at which time cultures were drawn. Patient was not admitted as he was doing ok.  He is now satting 92% on RA. Per sons, patient has been more confused for about 3 days: does not appropriately answer questions, defecates on himself. His sons think he is having diarrhea from the medication.  He completed a Z-pack and has taken 3 days of Plaquenil.  He was also prescribed zinc and vitamin c by his outpatient provider.

## 2020-04-21 NOTE — PROGRESS NOTE ADULT - ASSESSMENT
84 M Romanian speaking, DM2 and hyperlipidemia, known COVID-19 positive with recent ED visit, brought by EMS from home for blood culture growing gram positive rods in 2/4 bottles. His sons also note altered mental status over last few days, today pt is alert and awake.      #COVID -19 infection, #Viral Pneumonia 2/2 Novel Coronavirus Infection (COVID-19):  --Closely monitor respiratory status and escalate O2 therapy as necessary to maintain SpO2 92-96%; per institution policy, will escalate NC -> 100% NRB -> intubation (avoiding BiPAP/CPAP/High Flow given risk of aerosolizing virus). Will use continuous oximetry monitoring if/when on NRB.  --Continue Plaquenil x 2 more days given patient had 3 days of Plaquenil as an outpatient. EKG reviewed on admission and QTc < 500 msec. No need for further cardiac monitoring.  -consider prone positioning if the pt is able to tolerate this to help with oxygenation    --START daily lovenox given increased hypercoagulable state in COVID-19 patients (BMI > 30 and CrCl > 15ml/min: Lovenox 40mg SQ BID)  -monitor inflammotroy markers [id number] to monitor disease progression (procalcitonin, CRP, ESR, ferritin, coags, D-dimer, LDH)  --Acetaminophen 650 mg PO q4h PRN fever. Limit use of NSAIDs.  --HFA albuterol Q6 hour PRN via MDI. Would avoid nebulized preparations to limit risk of aerosol formation.  --Defer interleukin inhibitor at this time; would consider if inflammatory markers are elevated and patient has worsening hypoxia.  --Monitor daily (or as needed): CBC, CMP, Mg, Phos, CPK  --Maintain isolation precautions with contact/airborne  --Limit IVF given risk of ARDS      #bacteremia  #GNR Bacteremia in 2/4 cultures from 4/19  - patient completed course of azithromycin prior to positive culture result  - followup repeat cultures (sent on 4/21)  - for now as d/w ID, will hold off on abx for now unless pt is febrile or HD unstable       #encephalopathy/Altered mental status at admission   - likely in setting of infection  - frequent orientation  - consider CTH if worsens given hypercoagulable state with COVID infection      #type 2DM  - hold home meds for now  -get A1c  - Im okay for now without FSBG or ISS now, pt has acceptable FSBG without any insulin use and in the setting of covid we can minimize contact for staff with frequent FSBG which may not be needed  -diabetic diet       #HTN  monitor goal <130/80 in DM   not on home meds       #DVT Prophylaxis  --Given increased hypercoagulable state in COVID-19 patients, will start: BMI < 30 and CrCl > 15ml/min:  --Will consider need for extended prophylaxis prior to discharge based on D-Dimer and calculated VTE risk.      #DC planning/care coordination  #GOC  - FULL CODE per discussion with sons  -Eventual plan for discharge home with proper post-hospitalization instructions, including self-isolation at home, close monitoring of symptoms, etc.       plan of care d/w 7 carloz Mejía      Diagnosis Codes:   J96.01 Acute respiratory failure with hypoxia;   U07.1 2019 Novel Coronavirus (COVID-19);   J12.89 Other viral pneumonia  CPT Code: Moderate Complexity 38871

## 2020-04-21 NOTE — H&P ADULT - ASSESSMENT
Impression:  84 M Emir[place] speaking, DM2 and hyperlipidemia, known COVID-19 positive with recent ED visit, brought by EMS from home for blood culture growing gram positive rods in 1/4 bottles. His sons also note altered mental status over last few days.

## 2020-04-21 NOTE — ED PROVIDER NOTE - CARE PLAN
Principal Discharge DX:	Blood culture positive for microorganism  Secondary Diagnosis:	COVID-19 virus infection  Secondary Diagnosis:	Encephalopathy acute

## 2020-04-21 NOTE — ED PROVIDER NOTE - PROGRESS NOTE DETAILS
Patient with known COVID pneumonia, satting 92% on RA. Patient's blood culture growing gram positive rods in 1/4 cultures. Will f/u repeat blood cultures. Will admit to medicine, given acute encephalopathy from COVID-19 infection.

## 2020-04-22 LAB
A1C WITH ESTIMATED AVERAGE GLUCOSE RESULT: 8.7 % — HIGH (ref 4–5.6)
ALBUMIN SERPL ELPH-MCNC: 3.2 G/DL — LOW (ref 3.3–5)
ALP SERPL-CCNC: 58 U/L — SIGNIFICANT CHANGE UP (ref 40–120)
ALT FLD-CCNC: 30 U/L — SIGNIFICANT CHANGE UP (ref 10–45)
ANION GAP SERPL CALC-SCNC: 12 MMOL/L — SIGNIFICANT CHANGE UP (ref 5–17)
APPEARANCE UR: CLEAR — SIGNIFICANT CHANGE UP
AST SERPL-CCNC: 72 U/L — HIGH (ref 10–40)
BACTERIA # UR AUTO: NEGATIVE — SIGNIFICANT CHANGE UP
BILIRUB SERPL-MCNC: 0.5 MG/DL — SIGNIFICANT CHANGE UP (ref 0.2–1.2)
BILIRUB UR-MCNC: NEGATIVE — SIGNIFICANT CHANGE UP
BUN SERPL-MCNC: 24 MG/DL — HIGH (ref 7–23)
CALCIUM SERPL-MCNC: 8.8 MG/DL — SIGNIFICANT CHANGE UP (ref 8.4–10.5)
CHLORIDE SERPL-SCNC: 100 MMOL/L — SIGNIFICANT CHANGE UP (ref 96–108)
CO2 SERPL-SCNC: 24 MMOL/L — SIGNIFICANT CHANGE UP (ref 22–31)
COLOR SPEC: YELLOW — SIGNIFICANT CHANGE UP
CREAT SERPL-MCNC: 1.28 MG/DL — SIGNIFICANT CHANGE UP (ref 0.5–1.3)
CULTURE RESULTS: SIGNIFICANT CHANGE UP
CULTURE RESULTS: SIGNIFICANT CHANGE UP
DIFF PNL FLD: SIGNIFICANT CHANGE UP
EPI CELLS # UR: 2 /HPF — SIGNIFICANT CHANGE UP (ref 0–5)
ESTIMATED AVERAGE GLUCOSE: 203 MG/DL — HIGH (ref 68–114)
GLUCOSE BLDC GLUCOMTR-MCNC: 132 MG/DL — HIGH (ref 70–99)
GLUCOSE BLDC GLUCOMTR-MCNC: 153 MG/DL — HIGH (ref 70–99)
GLUCOSE SERPL-MCNC: 172 MG/DL — HIGH (ref 70–99)
GLUCOSE UR QL: ABNORMAL
HCT VFR BLD CALC: 40.4 % — SIGNIFICANT CHANGE UP (ref 39–50)
HGB BLD-MCNC: 12.8 G/DL — LOW (ref 13–17)
HYALINE CASTS # UR AUTO: 7 /LPF — SIGNIFICANT CHANGE UP (ref 0–7)
KETONES UR-MCNC: SIGNIFICANT CHANGE UP
LEUKOCYTE ESTERASE UR-ACNC: NEGATIVE — SIGNIFICANT CHANGE UP
MCHC RBC-ENTMCNC: 27.5 PG — SIGNIFICANT CHANGE UP (ref 27–34)
MCHC RBC-ENTMCNC: 31.7 GM/DL — LOW (ref 32–36)
MCV RBC AUTO: 86.7 FL — SIGNIFICANT CHANGE UP (ref 80–100)
NITRITE UR-MCNC: NEGATIVE — SIGNIFICANT CHANGE UP
NRBC # BLD: 0 /100 WBCS — SIGNIFICANT CHANGE UP (ref 0–0)
PH UR: 6 — SIGNIFICANT CHANGE UP (ref 5–8)
PLATELET # BLD AUTO: 127 K/UL — LOW (ref 150–400)
POTASSIUM SERPL-MCNC: 4.3 MMOL/L — SIGNIFICANT CHANGE UP (ref 3.5–5.3)
POTASSIUM SERPL-SCNC: 4.3 MMOL/L — SIGNIFICANT CHANGE UP (ref 3.5–5.3)
PROT SERPL-MCNC: 7 G/DL — SIGNIFICANT CHANGE UP (ref 6–8.3)
PROT UR-MCNC: ABNORMAL
RBC # BLD: 4.66 M/UL — SIGNIFICANT CHANGE UP (ref 4.2–5.8)
RBC # FLD: 13.9 % — SIGNIFICANT CHANGE UP (ref 10.3–14.5)
RBC CASTS # UR COMP ASSIST: 9 /HPF — HIGH (ref 0–4)
SODIUM SERPL-SCNC: 136 MMOL/L — SIGNIFICANT CHANGE UP (ref 135–145)
SP GR SPEC: 1.02 — SIGNIFICANT CHANGE UP (ref 1.01–1.02)
SPECIMEN SOURCE: SIGNIFICANT CHANGE UP
UROBILINOGEN FLD QL: SIGNIFICANT CHANGE UP
WBC # BLD: 4.36 K/UL — SIGNIFICANT CHANGE UP (ref 3.8–10.5)
WBC # FLD AUTO: 4.36 K/UL — SIGNIFICANT CHANGE UP (ref 3.8–10.5)
WBC UR QL: 2 /HPF — SIGNIFICANT CHANGE UP (ref 0–5)

## 2020-04-22 PROCEDURE — 99233 SBSQ HOSP IP/OBS HIGH 50: CPT

## 2020-04-22 RX ORDER — ASCORBIC ACID 60 MG
500 TABLET,CHEWABLE ORAL DAILY
Refills: 0 | Status: DISCONTINUED | OUTPATIENT
Start: 2020-04-22 | End: 2020-04-30

## 2020-04-22 RX ORDER — ZINC SULFATE TAB 220 MG (50 MG ZINC EQUIVALENT) 220 (50 ZN) MG
220 TAB ORAL DAILY
Refills: 0 | Status: DISCONTINUED | OUTPATIENT
Start: 2020-04-22 | End: 2020-04-30

## 2020-04-22 RX ORDER — GLUCOSAMINE HCL/CHONDROITIN SU 500-400 MG
1 CAPSULE ORAL
Qty: 0 | Refills: 0 | DISCHARGE

## 2020-04-22 RX ORDER — NYSTATIN CREAM 100000 [USP'U]/G
1 CREAM TOPICAL THREE TIMES A DAY
Refills: 0 | Status: DISCONTINUED | OUTPATIENT
Start: 2020-04-22 | End: 2020-04-30

## 2020-04-22 RX ORDER — DEXTROSE 50 % IN WATER 50 %
25 SYRINGE (ML) INTRAVENOUS ONCE
Refills: 0 | Status: DISCONTINUED | OUTPATIENT
Start: 2020-04-22 | End: 2020-04-22

## 2020-04-22 RX ORDER — INSULIN LISPRO 100/ML
VIAL (ML) SUBCUTANEOUS
Refills: 0 | Status: DISCONTINUED | OUTPATIENT
Start: 2020-04-22 | End: 2020-04-22

## 2020-04-22 RX ORDER — DEXTROSE 50 % IN WATER 50 %
15 SYRINGE (ML) INTRAVENOUS ONCE
Refills: 0 | Status: DISCONTINUED | OUTPATIENT
Start: 2020-04-22 | End: 2020-04-22

## 2020-04-22 RX ORDER — DEXTROSE 50 % IN WATER 50 %
12.5 SYRINGE (ML) INTRAVENOUS ONCE
Refills: 0 | Status: DISCONTINUED | OUTPATIENT
Start: 2020-04-22 | End: 2020-04-22

## 2020-04-22 RX ADMIN — Medication 400 MILLIGRAM(S): at 12:41

## 2020-04-22 RX ADMIN — Medication 81 MILLIGRAM(S): at 12:42

## 2020-04-22 RX ADMIN — ENOXAPARIN SODIUM 40 MILLIGRAM(S): 100 INJECTION SUBCUTANEOUS at 16:29

## 2020-04-22 RX ADMIN — Medication 650 MILLIGRAM(S): at 01:02

## 2020-04-22 RX ADMIN — Medication 650 MILLIGRAM(S): at 12:41

## 2020-04-22 RX ADMIN — NYSTATIN CREAM 1 APPLICATION(S): 100000 CREAM TOPICAL at 12:41

## 2020-04-22 RX ADMIN — ENOXAPARIN SODIUM 40 MILLIGRAM(S): 100 INJECTION SUBCUTANEOUS at 04:33

## 2020-04-22 NOTE — CHART NOTE - NSCHARTNOTEFT_GEN_A_CORE
Please note spoke with son (emergency contact today) earlier to discuss pt clincial course and case  SOn dale called asking to speak with MD and stated that he is point person bc he understands english better than his brother      Son dale very upset, at times raising voice   asked Dale to please speak clamy regarding pts clincial care     Dale expressing anger that father was thrown out at the first ED visit and was cold and sitting in urine and sent home and only called back for the positive + cultures.  explained to Dale that father as COVID and regarding blood cx likely contaminnant and awaiting repeat cultures and growth  Dale states father needs IV vitamin C and Zinc and fluids and that is what will help him with covid and we are not giving it bc of short supply. I explained that treatment with IV vitamins let alone PO vitamins is not in evidence based guidelines nor in hospital protocol.  Dale concentrating that is mother improved with vitamins and that his father needs the same  I explained that with COVID pts can have different trajectory and that we know male over age 60 with DM are at higher risk for COVID.  dale states "conspiracy" on evidence and data about whom virus affects.  he is upset that not more treatment is being done for father for COVID and I explained he finished Plaquenil and receiving Oxygen  which he would not otherwise have at home.  at this time pt does not qualify for remedesvir trial and does not meet criteria for [place].  dale also endoresed to give tylelol for temps lower than 100.3 and I explained unless pt has pain fever is defined as 100.3 and above and will treat the fever at that point with tynelol.    >20 min in discussuion with Dale     Plan    explained we can start PO vitamin C and Zinc and will not give these meds IV  nutrition consult to opitimize nutrition tomorrow

## 2020-04-22 NOTE — PROGRESS NOTE ADULT - ASSESSMENT
84 M Micronesian speaking, DM2 and hyperlipidemia, known COVID-19 positive with recent ED visit, brought by EMS from home for blood culture growing gram positive rods consistent with 2 different species of corneybacterium  in 2/4 bottles.   His sons also note altered mental status over last few days PTA, AMS improved inpt, pt is awake and alert.  Regarding COVID, pt has now finished plaqunil course which was started outpt and now   4/22-- pt continues to have fever and 2L NC      #COVID -19 infection, #Viral Pneumonia 2/2 Novel Coronavirus Infection (COVID-19):  pt completed Plaquenil course from outpt to inpt    --Closely monitor respiratory status and escalate O2 therapy as necessary to maintain SpO2 92-96%; per institution policy, will escalate NC -> 100% NRB -> intubation (avoiding BiPAP/CPAP/High Flow given risk of aerosolizing virus). Will use continuous oximetry monitoring if/when on NRB.  -consider prone positioning if the pt is able to tolerate this to help with oxygenation    --c/w daily lovenox given increased hypercoagulable state in COVID-19 patients (BMI > 30 and CrCl > 15ml/min: Lovenox 40mg SQ BID)- monitor platelets   -monitor inflammotroy markers [id number] to monitor disease progression (procalcitonin, CRP, ESR, ferritin, coags, D-dimer, LDH)  --Acetaminophen 650 mg PO q4h PRN fever. Limit use of NSAIDs.  --HFA albuterol Q6 hour PRN via MDI. Would avoid nebulized preparations to limit risk of aerosol formation.  --Defer interleukin inhibitor at this time; would consider if inflammatory markers are elevated and patient has worsening hypoxia.  --Monitor daily (or as needed): CBC, CMP, Mg, Phos, CPK  --Maintain isolation precautions with contact/airborne  --Limit IVF given risk of ARDS      #bacteremia  #GNR Bacteremia in 2/4 cultures from 4/19- consistent with two different cornebacterium  - patient completed course of azithromycin prior to positive culture result  - followup repeat cultures (sent on 4/21)  - for now as d/w ID, will hold off on abx for now unless pt is febrile or HD unstable   - if pt is with fevers again, than send blood cultures at the time of the fever       #encephalopathy/Altered mental status at admission   - likely in setting of infection  - frequent orientation  - consider CTH if worsens given hypercoagulable state with COVID infection      #type 2DM  - hold home meds for now  -A1C 8.4- on two oral agents, A1C goal for 81 year old is close to 8, can followup outpt for this with his PCP   - Im okay for now without FSBG or ISS now, pt has acceptable FSBG without any insulin use and in the setting of covid we can minimize contact for staff with frequent FSBG which may not be needed  -diabetic diet   -goal FSBG 100-180 and pt has remained in this range without the use of insulin   if we notice increase trend up on BMP >200 we can reinstate the FSBG but for now, minimize frequent FSBG         #HTN  monitor goal <130/80 in DM   not on home meds for HTN      #DVT Prophylaxis  --Given increased hypercoagulable state in COVID-19 patients, c/w Lovenox 40 BID : BMI > 30 and CrCl > 15ml/min:  --Will consider need for extended prophylaxis prior to discharge based on D-Dimer and calculated VTE risk.      #DC planning/care coordination  - FULL CODE per discussion with sons  -Eventual plan for discharge home with proper post-hospitalization instructions, including self-isolation at home, close monitoring of symptoms, etc.   -needs complete med recc prior to dc     plan of care d/w 7 tower [facility]       Diagnosis Codes:   J96.01 Acute respiratory failure with hypoxia;   U07.1 2019 Novel Coronavirus (COVID-19);   J12.89 Other viral pneumonia  CPT Code: Moderate Complexity 94163 84 M Burundian speaking, DM2 and hyperlipidemia, known COVID-19 positive with recent ED visit, brought by EMS from home for blood culture growing gram positive rods consistent with 2 different species of corneybacterium  in 2/4 bottles.   His sons also note altered mental status over last few days PTA, AMS improved inpt, pt is awake and alert.  Regarding COVID, pt has now finished plaqunil course which was started outpt and now   4/22-- pt continues to have fever and 2L NC      #COVID -19 infection, #Viral Pneumonia 2/2 Novel Coronavirus Infection (COVID-19):  pt completed Plaquenil course from outpt to inpt    --Closely monitor respiratory status and escalate O2 therapy as necessary to maintain SpO2 92-96%; per institution policy, will escalate NC -> 100% NRB -> intubation (avoiding BiPAP/CPAP/High Flow given risk of aerosolizing virus). Will use continuous oximetry monitoring if/when on NRB.  -consider prone positioning if the pt is able to tolerate this to help with oxygenation    --c/w daily lovenox given increased hypercoagulable state in COVID-19 patients (BMI > 30 and CrCl > 15ml/min: Lovenox 40mg SQ BID)- monitor platelets   -monitor inflammotroy markers [id number] to monitor disease progression (procalcitonin, CRP, ESR, ferritin, coags, D-dimer, LDH)  --Acetaminophen 650 mg PO q4h PRN fever. Limit use of NSAIDs.  --HFA albuterol Q6 hour PRN via MDI. Would avoid nebulized preparations to limit risk of aerosol formation.  --Defer interleukin inhibitor at this time; would consider if inflammatory markers are elevated and patient has worsening hypoxia.  --Monitor daily (or as needed): CBC, CMP, Mg, Phos, CPK  --Maintain isolation precautions with contact/airborne  --Limit IVF given risk of ARDS      #bacteremia  #GNR Bacteremia in 2/4 cultures from 4/19- consistent with two different cornebacterium  - patient completed course of azithromycin prior to positive culture result  - followup repeat cultures (sent on 4/21)  - for now as d/w ID, will hold off on abx for now unless pt is febrile or HD unstable   - if pt is with fevers again, than send blood cultures at the time of the fever       #encephalopathy/Altered mental status at admission   - likely in setting of infection  - frequent orientation  - consider CTH if worsens given hypercoagulable state with COVID infection      #type 2DM  - hold home meds for now  -A1C 8.4- on two oral agents, A1C goal for 81 year old is close to 8, can followup outpt for this with his PCP   - Im okay for now without FSBG or ISS now, pt has acceptable FSBG without any insulin use and in the setting of covid we can minimize contact for staff with frequent FSBG which may not be needed  -diabetic diet   -goal FSBG 100-180 and pt has remained in this range without the use of insulin   if we notice increase trend up on BMP >200 we can reinstate the FSBG but for now, minimize frequent FSBG         #HTN  monitor goal <130/80 in DM   not on home meds for HTN      #DVT Prophylaxis  --Given increased hypercoagulable state in COVID-19 patients, c/w Lovenox 40 BID : BMI > 30 and CrCl > 15ml/min:  --Will consider need for extended prophylaxis prior to discharge based on D-Dimer and calculated VTE risk.      #DC planning/care coordination  - FULL CODE per discussion with sons  4/22 son Jewel updated about fathers status and clinical course, son expressed mom also sick with COVID and d/w son that he should talk to PCP about treatment and monitoring at home as pt and steps to take to prevent hospitlization for mom if possible   -Eventual plan for discharge home with proper post-hospitalization instructions, including self-isolation at home, close monitoring of symptoms, etc.   -needs complete med recc prior to dc     plan of care d/w 7 julianer [facility]       Diagnosis Codes:   J96.01 Acute respiratory failure with hypoxia;   U07.1 2019 Novel Coronavirus (COVID-19);   J12.89 Other viral pneumonia  CPT Code: Moderate Complexity 75005 84 M Tanzanian speaking, DM2 and hyperlipidemia, known COVID-19 positive with recent ED visit, brought by EMS from home for blood culture growing gram positive rods consistent with 2 different species of corneybacterium  in 2/4 bottles.   His sons also note altered mental status over last few days PTA, AMS improved inpt, pt is awake and alert.  Regarding COVID, pt has now finished plaqunil course which was started outpt and now   4/22-- pt continues to have fever and 2L NC      #COVID -19 infection, #Viral Pneumonia 2/2 Novel Coronavirus Infection (COVID-19):  pt completed Plaquenil course from outpt to inpt  4/22  --Closely monitor respiratory status and escalate O2 therapy as necessary to maintain SpO2 92-96%; per institution policy, will escalate NC -> 100% NRB -> intubation (avoiding BiPAP/CPAP/High Flow given risk of aerosolizing virus). Will use continuous oximetry monitoring if/when on NRB.  -consider prone positioning if the pt is able to tolerate this to help with oxygenation    --c/w daily lovenox given increased hypercoagulable state in COVID-19 patients (BMI > 30 and CrCl > 15ml/min: Lovenox 40mg SQ BID)- monitor platelets   -monitor inflammotroy markers [id number] to monitor disease progression (procalcitonin, CRP, ESR, ferritin, coags, D-dimer, LDH)  --Acetaminophen 650 mg PO q4h PRN fever. Limit use of NSAIDs.  --HFA albuterol Q6 hour PRN via MDI. Would avoid nebulized preparations to limit risk of aerosol formation.  --Defer interleukin inhibitor at this time; would consider if inflammatory markers are elevated and patient has worsening hypoxia.  --Monitor daily (or as needed): CBC, CMP, Mg, Phos, CPK  --Maintain isolation precautions with contact/airborne  --Limit IVF given risk of ARDS      #bacteremia  #GNR Bacteremia in 2/4 cultures from 4/19- consistent with two different cornebacterium  - patient completed course of azithromycin prior to positive culture result  - followup repeat cultures (sent on 4/21)  - for now as d/w ID, will hold off on abx for now unless pt is febrile or HD unstable   - if pt is with fevers again, than send blood cultures at the time of the fever       #encephalopathy/Altered mental status at admission   - likely in setting of infection  - frequent orientation  - consider CTH if worsens given hypercoagulable state with COVID infection      #type 2DM  - hold home meds for now  -A1C 8.4- on two oral agents, A1C goal for 81 year old is close to 8, can followup outpt for this with his PCP   - Im okay for now without FSBG or ISS now, pt has acceptable FSBG without any insulin use and in the setting of covid we can minimize contact for staff with frequent FSBG which may not be needed  -diabetic diet   -goal FSBG 100-180 and pt has remained in this range without the use of insulin   if we notice increase trend up on BMP >200 we can reinstate the FSBG but for now, minimize frequent FSBG         #HTN  monitor goal <130/80 in DM   not on home meds for HTN      #DVT Prophylaxis  --Given increased hypercoagulable state in COVID-19 patients, c/w Lovenox 40 BID : BMI > 30 and CrCl > 15ml/min:  --Will consider need for extended prophylaxis prior to discharge based on D-Dimer and calculated VTE risk.      #DC planning/care coordination  - FULL CODE per discussion with sons  4/22 son Jewel updated about fathers status and clinical course, son expressed mom also sick with COVID and d/w son that he should talk to PCP about treatment and monitoring at home as pt and steps to take to prevent hospitlization for mom if possible   -PT eval  -Eventual plan for discharge home with proper post-hospitalization instructions, including self-isolation at home, close monitoring of symptoms, etc.   -needs complete med recc prior to dc     plan of care d/w 7 julianer [facility]       Diagnosis Codes:   J96.01 Acute respiratory failure with hypoxia;   U07.1 2019 Novel Coronavirus (COVID-19);   J12.89 Other viral pneumonia  CPT Code: Moderate Complexity 48832

## 2020-04-22 NOTE — PROGRESS NOTE ADULT - SUBJECTIVE AND OBJECTIVE BOX
Leanne Ace MD  Pager  [facility] [phone]  [phone] (nights and weekends)    SUBJECTIVE:  pt awake, eating   pt did not  with call to the room to speak via    when i saw him, he was in NAD and speaking in Icelandic.  also had meal tray in front of him and appears to have eaten half the meal.  Vitals reviewed- remains on 2LNC       MEDICATIONS  (STANDING):  aspirin  chewable 81 milliGRAM(s) Oral daily  enoxaparin Injectable 40 milliGRAM(s) SubCutaneous two times a day    MEDICATIONS  (PRN):  acetaminophen   Tablet .. 650 milliGRAM(s) Oral every 4 hours PRN Temp greater or equal to 38.5C (101.3F)  benzonatate 100 milliGRAM(s) Oral three times a day PRN Cough  glucagon  Injectable 1 milliGRAM(s) IntraMuscular once PRN Glucose LESS THAN 70 milligrams/deciliter  nystatin Powder 1 Application(s) Topical three times a day PRN rash MAD      Vital Signs Last 24 Hrs  T(C): 37.8 (22 Apr 2020 11:41), Max: 38.7 (22 Apr 2020 01:03)  T(F): 100.1 (22 Apr 2020 11:41), Max: 101.7 (22 Apr 2020 01:03)  HR: 89 (22 Apr 2020 08:52) (83 - 89)  BP: 115/62 (22 Apr 2020 08:52) (108/62 - 120/59)  BP(mean): --  RR: 24 (22 Apr 2020 08:52) (20 - 24)  SpO2: 91% (22 Apr 2020 08:52) (91% - 93%)    CAPILLARY BLOOD GLUCOSE      POCT Blood Glucose.: 153 mg/dL (22 Apr 2020 08:23)    I&O's Summary    21 Apr 2020 07:01  -  22 Apr 2020 07:00  --------------------------------------------------------  IN: 920 mL / OUT: 340 mL / NET: 580 mL        PHYSICAL EXAM:  GENERAL: Looks stated age, NAD  CARDIOVASCULAR: Normal S1, S2  PULMONARY: Lungs clear to auscultation bilaterally. No wheezes/rales/rhonchi  GI: Abdomen non-distended, soft, Nontender.  Bowel sounds present  MSK/Ext:  No leg edema.  No calf tenderness bilaterally  PSYCH: Normal Affect. AAOx3      LABS:                        12.8   4.36  )-----------( 127      ( 22 Apr 2020 07:23 )             40.4     04-22    136  |  100  |  24<H>  ----------------------------<  172<H>  4.3   |  24  |  1.28    Ca    8.8      22 Apr 2020 07:23  Mg     2.3     04-21    TPro  7.0  /  Alb  3.2<L>  /  TBili  0.5  /  DBili  x   /  AST  72<H>  /  ALT  30  /  AlkPhos  58  04-22      CARDIAC MARKERS ( 21 Apr 2020 06:36 )  x     / x     / 266 U/L / x     / x                RADIOLOGY & ADDITIONAL TESTS:

## 2020-04-23 LAB
ALBUMIN SERPL ELPH-MCNC: 3.4 G/DL — SIGNIFICANT CHANGE UP (ref 3.3–5)
ALP SERPL-CCNC: 64 U/L — SIGNIFICANT CHANGE UP (ref 40–120)
ALT FLD-CCNC: 37 U/L — SIGNIFICANT CHANGE UP (ref 10–45)
ANION GAP SERPL CALC-SCNC: 13 MMOL/L — SIGNIFICANT CHANGE UP (ref 5–17)
AST SERPL-CCNC: 79 U/L — HIGH (ref 10–40)
BASOPHILS # BLD AUTO: 0.02 K/UL — SIGNIFICANT CHANGE UP (ref 0–0.2)
BASOPHILS NFR BLD AUTO: 0.4 % — SIGNIFICANT CHANGE UP (ref 0–2)
BILIRUB SERPL-MCNC: 0.5 MG/DL — SIGNIFICANT CHANGE UP (ref 0.2–1.2)
BUN SERPL-MCNC: 22 MG/DL — SIGNIFICANT CHANGE UP (ref 7–23)
CALCIUM SERPL-MCNC: 9 MG/DL — SIGNIFICANT CHANGE UP (ref 8.4–10.5)
CHLORIDE SERPL-SCNC: 99 MMOL/L — SIGNIFICANT CHANGE UP (ref 96–108)
CO2 SERPL-SCNC: 26 MMOL/L — SIGNIFICANT CHANGE UP (ref 22–31)
CREAT SERPL-MCNC: 1.11 MG/DL — SIGNIFICANT CHANGE UP (ref 0.5–1.3)
CRP SERPL-MCNC: 10.55 MG/DL — HIGH (ref 0–0.4)
D DIMER BLD IA.RAPID-MCNC: 209 NG/ML DDU — SIGNIFICANT CHANGE UP
EOSINOPHIL # BLD AUTO: 0 K/UL — SIGNIFICANT CHANGE UP (ref 0–0.5)
EOSINOPHIL NFR BLD AUTO: 0 % — SIGNIFICANT CHANGE UP (ref 0–6)
FERRITIN SERPL-MCNC: 517 NG/ML — HIGH (ref 30–400)
GLUCOSE BLDC GLUCOMTR-MCNC: 203 MG/DL — HIGH (ref 70–99)
GLUCOSE BLDC GLUCOMTR-MCNC: 205 MG/DL — HIGH (ref 70–99)
GLUCOSE BLDC GLUCOMTR-MCNC: 221 MG/DL — HIGH (ref 70–99)
GLUCOSE SERPL-MCNC: 236 MG/DL — HIGH (ref 70–99)
HCT VFR BLD CALC: 42.2 % — SIGNIFICANT CHANGE UP (ref 39–50)
HGB BLD-MCNC: 13.5 G/DL — SIGNIFICANT CHANGE UP (ref 13–17)
IMM GRANULOCYTES NFR BLD AUTO: 1.5 % — SIGNIFICANT CHANGE UP (ref 0–1.5)
LYMPHOCYTES # BLD AUTO: 0.74 K/UL — LOW (ref 1–3.3)
LYMPHOCYTES # BLD AUTO: 14.2 % — SIGNIFICANT CHANGE UP (ref 13–44)
MAGNESIUM SERPL-MCNC: 2.3 MG/DL — SIGNIFICANT CHANGE UP (ref 1.6–2.6)
MCHC RBC-ENTMCNC: 27.6 PG — SIGNIFICANT CHANGE UP (ref 27–34)
MCHC RBC-ENTMCNC: 32 GM/DL — SIGNIFICANT CHANGE UP (ref 32–36)
MCV RBC AUTO: 86.1 FL — SIGNIFICANT CHANGE UP (ref 80–100)
MONOCYTES # BLD AUTO: 0.3 K/UL — SIGNIFICANT CHANGE UP (ref 0–0.9)
MONOCYTES NFR BLD AUTO: 5.8 % — SIGNIFICANT CHANGE UP (ref 2–14)
NEUTROPHILS # BLD AUTO: 4.07 K/UL — SIGNIFICANT CHANGE UP (ref 1.8–7.4)
NEUTROPHILS NFR BLD AUTO: 78.1 % — HIGH (ref 43–77)
NRBC # BLD: 0 /100 WBCS — SIGNIFICANT CHANGE UP (ref 0–0)
PHOSPHATE SERPL-MCNC: 2.5 MG/DL — SIGNIFICANT CHANGE UP (ref 2.5–4.5)
PLATELET # BLD AUTO: 167 K/UL — SIGNIFICANT CHANGE UP (ref 150–400)
POTASSIUM SERPL-MCNC: 4.6 MMOL/L — SIGNIFICANT CHANGE UP (ref 3.5–5.3)
POTASSIUM SERPL-SCNC: 4.6 MMOL/L — SIGNIFICANT CHANGE UP (ref 3.5–5.3)
PROT SERPL-MCNC: 7.3 G/DL — SIGNIFICANT CHANGE UP (ref 6–8.3)
RBC # BLD: 4.9 M/UL — SIGNIFICANT CHANGE UP (ref 4.2–5.8)
RBC # FLD: 13.9 % — SIGNIFICANT CHANGE UP (ref 10.3–14.5)
SODIUM SERPL-SCNC: 138 MMOL/L — SIGNIFICANT CHANGE UP (ref 135–145)
WBC # BLD: 5.21 K/UL — SIGNIFICANT CHANGE UP (ref 3.8–10.5)
WBC # FLD AUTO: 5.21 K/UL — SIGNIFICANT CHANGE UP (ref 3.8–10.5)

## 2020-04-23 PROCEDURE — 99233 SBSQ HOSP IP/OBS HIGH 50: CPT

## 2020-04-23 PROCEDURE — 70450 CT HEAD/BRAIN W/O DYE: CPT | Mod: 26

## 2020-04-23 RX ORDER — ASPIRIN/CALCIUM CARB/MAGNESIUM 324 MG
1 TABLET ORAL
Qty: 0 | Refills: 0 | DISCHARGE

## 2020-04-23 RX ORDER — ALBUTEROL 90 UG/1
2 AEROSOL, METERED ORAL EVERY 6 HOURS
Refills: 0 | Status: DISCONTINUED | OUTPATIENT
Start: 2020-04-23 | End: 2020-04-30

## 2020-04-23 RX ORDER — LINAGLIPTIN AND METFORMIN HYDROCHLORIDE 2.5; 85 MG/1; MG/1
1 TABLET, FILM COATED ORAL
Qty: 0 | Refills: 0 | DISCHARGE

## 2020-04-23 RX ORDER — GLIMEPIRIDE 1 MG
1 TABLET ORAL
Qty: 0 | Refills: 0 | DISCHARGE

## 2020-04-23 RX ORDER — TAMSULOSIN HYDROCHLORIDE 0.4 MG/1
1 CAPSULE ORAL
Qty: 0 | Refills: 0 | DISCHARGE

## 2020-04-23 RX ORDER — INSULIN LISPRO 100/ML
VIAL (ML) SUBCUTANEOUS
Refills: 0 | Status: DISCONTINUED | OUTPATIENT
Start: 2020-04-23 | End: 2020-04-30

## 2020-04-23 RX ORDER — INSULIN LISPRO 100/ML
2 VIAL (ML) SUBCUTANEOUS ONCE
Refills: 0 | Status: COMPLETED | OUTPATIENT
Start: 2020-04-23 | End: 2020-04-23

## 2020-04-23 RX ADMIN — Medication 2: at 17:48

## 2020-04-23 RX ADMIN — ALBUTEROL 2 PUFF(S): 90 AEROSOL, METERED ORAL at 22:11

## 2020-04-23 RX ADMIN — ALBUTEROL 2 PUFF(S): 90 AEROSOL, METERED ORAL at 17:25

## 2020-04-23 RX ADMIN — Medication 81 MILLIGRAM(S): at 12:37

## 2020-04-23 RX ADMIN — Medication 500 MILLIGRAM(S): at 12:36

## 2020-04-23 RX ADMIN — Medication 2: at 12:46

## 2020-04-23 RX ADMIN — ZINC SULFATE TAB 220 MG (50 MG ZINC EQUIVALENT) 220 MILLIGRAM(S): 220 (50 ZN) TAB at 12:36

## 2020-04-23 RX ADMIN — NYSTATIN CREAM 1 APPLICATION(S): 100000 CREAM TOPICAL at 18:24

## 2020-04-23 RX ADMIN — ENOXAPARIN SODIUM 40 MILLIGRAM(S): 100 INJECTION SUBCUTANEOUS at 06:00

## 2020-04-23 RX ADMIN — Medication 2 UNIT(S): at 10:24

## 2020-04-23 RX ADMIN — ENOXAPARIN SODIUM 40 MILLIGRAM(S): 100 INJECTION SUBCUTANEOUS at 17:25

## 2020-04-23 NOTE — PHYSICAL THERAPY INITIAL EVALUATION ADULT - ADDITIONAL COMMENTS
PTA pt lived in pvt home with wife (she is currently sick with COVID and cannot assist, sons may be able to assist), independent without AD.

## 2020-04-23 NOTE — CHART NOTE - NSCHARTNOTEFT_GEN_A_CORE
As per my conversation with patient , family and pharmacy    Patient takes following medications at home:     ASA 81mg daily  Flomax 0.4mg daily  Glimepiride 4mg daily  Tradjenta  5mg daily

## 2020-04-23 NOTE — PHYSICAL THERAPY INITIAL EVALUATION ADULT - GENERAL OBSERVATIONS, REHAB EVAL
Pt supine, NAD, VSS, +NRB 15L, +B wrist restraints, facetimed with holden León for Martiniquais translation.

## 2020-04-23 NOTE — PHYSICAL THERAPY INITIAL EVALUATION ADULT - PLANNED THERAPY INTERVENTIONS, PT EVAL
PLEASE CLICK THE EDIT BUTTON, ENTER YOUR RESPONSE TO THE QUERY AND SIGN THE NOTE.    Dear Dr. Jackman,    Noted the following 4/21 Urinalysis:    Results for ROSHNI DIANE (MRN [id number]) as of 4/26/2017 09:56   4/21/2017 21:25   URINE TYPE URINE, CLEAN CATC...   COLOR ANGELINA (A)   CLARITY CLOUDY   GLUCOSE(URINE) NEGATIVE   KETONES 15 (A)   PROTEIN(URINE) 30 (A)   BLOOD NEGATIVE   NITRITE NEGATIVE   LEUKOCYTE ESTERASE MODERATE (A)   BILIRUBIN NEGATIVE   UROBILINOGEN 0.2   pH 5.5   SPECIFIC GRAVITY 1.028   RBC 1 to 3   WBC 26 to 100   Bacteria MODERATE (A)   Hyaline Casts 11 to 25   Squamous EPI'S 11 to 25   TRANS EPI 1 to 5   MUCOUS PRESENT     4/21 Urine culture:  60,000 ,000 CFU/mL MULTIPLE ORGANISMS ISOLATED WITH NO PREDOMINANT TYPE, CONSISTENT WITH CONTAMINATION. CONSIDER RECOLLECTION      For accurate coding and severity of illness reflection, please clarify if there is/are secondary diagnosis(ses) associated with these findings.     Thank You,  Nixon Mcintosh RN BSN CCDS  Clinical      Questions:  [email or web address]      Please respond here:  No UTI, this is contamination    gait training/transfer training/bed mobility training

## 2020-04-23 NOTE — PHYSICAL THERAPY INITIAL EVALUATION ADULT - CRITERIA FOR SKILLED THERAPEUTIC INTERVENTIONS
anticipated discharge recommendation/functional limitations in following categories/risk reduction/prevention/therapy frequency/impairments found/rehab potential

## 2020-04-23 NOTE — PROGRESS NOTE ADULT - SUBJECTIVE AND OBJECTIVE BOX
Leanne Ace MD  Pager  [facility] [phone]  [phone] (nights and weekends)    SUBJECTIVE:  attemtped 3 times to speak with pt with Mongolian , pt is not able to participate with phone   son on facetime, pt is able to say his full name, knows he is in the bed  O2 requirements increasing to NRB   FSBG also increasing     MEDICATIONS  (STANDING):  ascorbic acid 500 milliGRAM(s) Oral daily  aspirin  chewable 81 milliGRAM(s) Oral daily  enoxaparin Injectable 40 milliGRAM(s) SubCutaneous two times a day  insulin lispro (HumaLOG) corrective regimen sliding scale   SubCutaneous three times a day before meals  zinc sulfate 220 milliGRAM(s) Oral daily    MEDICATIONS  (PRN):  acetaminophen   Tablet .. 650 milliGRAM(s) Oral every 4 hours PRN Temp greater or equal to 38.5C (101.3F)  benzonatate 100 milliGRAM(s) Oral three times a day PRN Cough  glucagon  Injectable 1 milliGRAM(s) IntraMuscular once PRN Glucose LESS THAN 70 milligrams/deciliter  nystatin Powder 1 Application(s) Topical three times a day PRN rash MAD      Vital Signs Last 24 Hrs  T(C): 37.4 (2020 10:34), Max: 38 (2020 04:56)  T(F): 99.3 (2020 10:34), Max: 100.4 (2020 04:56)  HR: 96 (2020 11:57) (85 - 114)  BP: 139/78 (2020 10:34) (129/73 - 153/76)  BP(mean): --  RR: 18 (2020 10:34) (18 - 24)  SpO2: 94% (2020 11:57) (90% - 96%)    CAPILLARY BLOOD GLUCOSE      POCT Blood Glucose.: 203 mg/dL (2020 12:44)  POCT Blood Glucose.: 221 mg/dL (2020 10:10)    I&O's Summary    2020 07:01  -  2020 07:00  --------------------------------------------------------  IN: 560 mL / OUT: 100 mL / NET: 460 mL    2020 07:01  -  2020 15:07  --------------------------------------------------------  IN: 360 mL / OUT: 300 mL / NET: 60 mL        PHYSICAL EXAM:  GENERAL: Looks stated age,   CARDIOVASCULAR: Normal S1, S2  PULMONARY: no on NRB  GI: Abdomen non-distended, soft, Nontender.  Bowel sounds present  MSK/Ext:  No leg edema.  No calf tenderness bilaterally  PSYCH: Normal Affect. AAOx3  NEUROLOGY: able to move all ext, 5/5 strength in UE and LE     LABS:                        13.5   5.21  )-----------( 167      ( 2020 06:33 )             42.2     04-23    138  |  99  |  22  ----------------------------<  236<H>  4.6   |  26  |  1.11    Ca    9.0      2020 06:33  Phos  2.5     04-23  Mg     2.3     04-23    TPro  7.3  /  Alb  3.4  /  TBili  0.5  /  DBili  x   /  AST  79<H>  /  ALT  37  /  AlkPhos  64  04-23          Urinalysis Basic - ( 2020 14:40 )    Color: Yellow / Appearance: Clear / S.021 / pH: x  Gluc: x / Ketone: Trace  / Bili: Negative / Urobili: <2 mg/dL   Blood: x / Protein: 100 mg/dL / Nitrite: Negative   Leuk Esterase: Negative / RBC: 9 /HPF / WBC 2 /HPF   Sq Epi: x / Non Sq Epi: 2 /HPF / Bacteria: Negative          RADIOLOGY & ADDITIONAL TESTS:

## 2020-04-23 NOTE — PROGRESS NOTE ADULT - ASSESSMENT
84 M Portuguese speaking, DM2 and hyperlipidemia, known COVID-19 positive with recent ED visit, brought by EMS from home for blood culture growing gram positive rods consistent with 2 different species of corneybacterium  in 2/4 bottles.   His sons also note altered mental status over last few days PTA, AMS improved inpt, pt is awake and alert.  Regarding COVID, pt has now finished plaqunil course which was started outpt and now   4/22-- pt continues to have fever and 2L NC  4/23- 02 requiremetns increasing. Now on NRB, pt taking off mask at times, needs to be placed in restraints. Plasma trial emailed for eligibility       #COVID -19 infection, #Viral Pneumonia 2/2 Novel Coronavirus Infection (COVID-19):  pt completed Plaquenil course from outpt to inpt  4/22  --Closely monitor respiratory status and escalate O2 therapy as necessary to maintain SpO2 92-96%; per institution policy, will escalate NC -> 100% NRB -> intubation (avoiding BiPAP/CPAP/High Flow given risk of aerosolizing virus). use continuous oximetry monitoring if/when on NRB.  -consider prone positioning if the pt is able to tolerate this to help with oxygenation    --c/w daily lovenox given increased hypercoagulable state in COVID-19 patients (BMI > 30 and CrCl > 15ml/min: Lovenox 40mg SQ BID)- monitor platelets   -monitor inflammotroy markers [id number] to monitor disease progression (procalcitonin, CRP, ESR, ferritin, coags, D-dimer, LDH)- get all labs for tomorrow   --Acetaminophen 650 mg PO q4h PRN fever. Limit use of NSAIDs.  --HFA albuterol Q6 hour PRN via MDI. Would avoid nebulized preparations to limit risk of aerosol formation.  --Defer interleukin inhibitor at this time; would consider if inflammatory markers are elevated and patient has worsening hypoxia.  If the pt remains on NRB for extended period of time >12hrs and increasing markers would consider dose of tocizulmab (discuss also with ID as pt with + blood cultures prior though repeat have been negative)  --Monitor daily (or as needed): CBC, CMP, Mg, Phos, CPK  --Maintain isolation precautions with contact/airborne  --Limit IVF given risk of ARDS      #bacteremia  #GNR Bacteremia in 2/4 cultures from 4/19- consistent with two different cornebacterium  - patient completed course of azithromycin prior to positive culture result  - followup repeat cultures (sent on 4/21)-- thus far no growth to date   - for now as d/w ID, will hold off on abx for now unless pt is febrile or HD unstable   - if pt is with fevers again, than send blood cultures at the time of the fever       #encephalopathy/Altered mental status at admission   - likely in setting of infection  - frequent orientation  - consider CTH if worsens given hypercoagulable state with COVID infection, d/w neuroogy regarding this. could be hospital delirium vs encephlapathy seen with covid       #type 2DM  - hold home meds for now  -A1C 8.4- on two oral agents, A1C goal for 81 year old is close to 8, can followup outpt for this with his PCP   -goal FSBG 100-180 and pt has remained in this range without the use of insulin   -low dose ISS for now  if FSBG >250 X2 or more FSBG start Lantus 5 units at night and add 2 units lispro before meals   -diabetic diet     if we notice increase trend up on BMP >200 we can reinstate the FSBG but for now, minimize frequent FSBG         #HTN  monitor goal <130/80 in DM   not on home meds for HTN  monitor BP for now. if remains elevated can start low dose norvasc as needed       #DVT Prophylaxis  --Given increased hypercoagulable state in COVID-19 patients, c/w Lovenox 40 BID : BMI > 30 and CrCl > 15ml/min:  --Will consider need for extended prophylaxis prior to discharge based on D-Dimer and calculated VTE risk.      #DC planning/care coordination  - FULL CODE per discussion with sons  4/22 son Jewel updated about fathers status and clinical course, son expressed mom also sick with COVID and d/w son that he should talk to PCP about treatment and monitoring at home as pt and steps to take to prevent hospitlization for mom if possible   4/23- son tapan updated about pts clinical course and increasing 02 requirement with NRB  -PT eval  -Eventual plan for discharge home with proper post-hospitalization instructions, including self-isolation at home, close monitoring of symptoms, etc.       plan of care d/w 7 tower [facility]       Diagnosis Codes:   J96.01 Acute respiratory failure with hypoxia;   U07.1 2019 Novel Coronavirus (COVID-19);   J12.89 Other viral pneumonia  CPT Code: Moderate Complexity 13200

## 2020-04-23 NOTE — PHYSICAL THERAPY INITIAL EVALUATION ADULT - IMPAIRMENTS FOUND, PT EVAL
aerobic capacity/endurance/gait, locomotion, and balance/muscle strength/gross motor/ventilation and respiration/gas exchange/arousal, attention, and cognition/cognitive impairment

## 2020-04-23 NOTE — PHYSICAL THERAPY INITIAL EVALUATION ADULT - PERTINENT HX OF CURRENT PROBLEM, REHAB EVAL
Pt is a 84 M Niuean speaking, DM2 and hyperlipidemia, known COVID-19 positive with recent ED visit, brought by EMS from home for blood culture growing gram positive rods consistent with 2 different species of corneybacterium  in 2/4 bottles. Regarding COVID, pt has now finished plaqunil course which was started outpt and now pt continues to have fever and 2L NC.

## 2020-04-24 LAB
-  AMPICILLIN: SIGNIFICANT CHANGE UP
-  CIPROFLOXACIN: SIGNIFICANT CHANGE UP
-  LEVOFLOXACIN: SIGNIFICANT CHANGE UP
-  NITROFURANTOIN: SIGNIFICANT CHANGE UP
-  TETRACYCLINE: SIGNIFICANT CHANGE UP
-  VANCOMYCIN: SIGNIFICANT CHANGE UP
ALBUMIN SERPL ELPH-MCNC: 3.3 G/DL — SIGNIFICANT CHANGE UP (ref 3.3–5)
ALP SERPL-CCNC: 69 U/L — SIGNIFICANT CHANGE UP (ref 40–120)
ALT FLD-CCNC: 38 U/L — SIGNIFICANT CHANGE UP (ref 10–45)
ANION GAP SERPL CALC-SCNC: 14 MMOL/L — SIGNIFICANT CHANGE UP (ref 5–17)
AST SERPL-CCNC: 76 U/L — HIGH (ref 10–40)
BILIRUB SERPL-MCNC: 0.7 MG/DL — SIGNIFICANT CHANGE UP (ref 0.2–1.2)
BLD GP AB SCN SERPL QL: NEGATIVE — SIGNIFICANT CHANGE UP
BUN SERPL-MCNC: 24 MG/DL — HIGH (ref 7–23)
CALCIUM SERPL-MCNC: 8.9 MG/DL — SIGNIFICANT CHANGE UP (ref 8.4–10.5)
CHLORIDE SERPL-SCNC: 101 MMOL/L — SIGNIFICANT CHANGE UP (ref 96–108)
CK MB CFR SERPL CALC: 1.8 NG/ML — SIGNIFICANT CHANGE UP (ref 0–6.7)
CK SERPL-CCNC: 64 U/L — SIGNIFICANT CHANGE UP (ref 30–200)
CK SERPL-CCNC: 64 U/L — SIGNIFICANT CHANGE UP (ref 30–200)
CO2 SERPL-SCNC: 24 MMOL/L — SIGNIFICANT CHANGE UP (ref 22–31)
CREAT SERPL-MCNC: 1.01 MG/DL — SIGNIFICANT CHANGE UP (ref 0.5–1.3)
CRP SERPL-MCNC: 11.33 MG/DL — HIGH (ref 0–0.4)
CULTURE RESULTS: SIGNIFICANT CHANGE UP
D DIMER BLD IA.RAPID-MCNC: 194 NG/ML DDU — SIGNIFICANT CHANGE UP
FERRITIN SERPL-MCNC: 552 NG/ML — HIGH (ref 30–400)
GLUCOSE BLDC GLUCOMTR-MCNC: 194 MG/DL — HIGH (ref 70–99)
GLUCOSE BLDC GLUCOMTR-MCNC: 196 MG/DL — HIGH (ref 70–99)
GLUCOSE BLDC GLUCOMTR-MCNC: 208 MG/DL — HIGH (ref 70–99)
GLUCOSE BLDC GLUCOMTR-MCNC: 229 MG/DL — HIGH (ref 70–99)
GLUCOSE SERPL-MCNC: 219 MG/DL — HIGH (ref 70–99)
HCT VFR BLD CALC: 42.2 % — SIGNIFICANT CHANGE UP (ref 39–50)
HGB BLD-MCNC: 13.1 G/DL — SIGNIFICANT CHANGE UP (ref 13–17)
MAGNESIUM SERPL-MCNC: 2.3 MG/DL — SIGNIFICANT CHANGE UP (ref 1.6–2.6)
MCHC RBC-ENTMCNC: 26.9 PG — LOW (ref 27–34)
MCHC RBC-ENTMCNC: 31 GM/DL — LOW (ref 32–36)
MCV RBC AUTO: 86.7 FL — SIGNIFICANT CHANGE UP (ref 80–100)
METHOD TYPE: SIGNIFICANT CHANGE UP
NRBC # BLD: 0 /100 WBCS — SIGNIFICANT CHANGE UP (ref 0–0)
ORGANISM # SPEC MICROSCOPIC CNT: SIGNIFICANT CHANGE UP
ORGANISM # SPEC MICROSCOPIC CNT: SIGNIFICANT CHANGE UP
PHOSPHATE SERPL-MCNC: 2.4 MG/DL — LOW (ref 2.5–4.5)
PLATELET # BLD AUTO: 209 K/UL — SIGNIFICANT CHANGE UP (ref 150–400)
POTASSIUM SERPL-MCNC: 4.6 MMOL/L — SIGNIFICANT CHANGE UP (ref 3.5–5.3)
POTASSIUM SERPL-SCNC: 4.6 MMOL/L — SIGNIFICANT CHANGE UP (ref 3.5–5.3)
PROT SERPL-MCNC: 7.5 G/DL — SIGNIFICANT CHANGE UP (ref 6–8.3)
RBC # BLD: 4.87 M/UL — SIGNIFICANT CHANGE UP (ref 4.2–5.8)
RBC # FLD: 13.8 % — SIGNIFICANT CHANGE UP (ref 10.3–14.5)
RH IG SCN BLD-IMP: NEGATIVE — SIGNIFICANT CHANGE UP
SODIUM SERPL-SCNC: 139 MMOL/L — SIGNIFICANT CHANGE UP (ref 135–145)
SPECIMEN SOURCE: SIGNIFICANT CHANGE UP
TROPONIN T, HIGH SENSITIVITY RESULT: 28 NG/L — SIGNIFICANT CHANGE UP (ref 0–51)
WBC # BLD: 7.7 K/UL — SIGNIFICANT CHANGE UP (ref 3.8–10.5)
WBC # FLD AUTO: 7.7 K/UL — SIGNIFICANT CHANGE UP (ref 3.8–10.5)

## 2020-04-24 PROCEDURE — 99233 SBSQ HOSP IP/OBS HIGH 50: CPT

## 2020-04-24 PROCEDURE — 93010 ELECTROCARDIOGRAM REPORT: CPT

## 2020-04-24 RX ORDER — DEXTROSE 50 % IN WATER 50 %
25 SYRINGE (ML) INTRAVENOUS ONCE
Refills: 0 | Status: DISCONTINUED | OUTPATIENT
Start: 2020-04-24 | End: 2020-04-30

## 2020-04-24 RX ORDER — ACETAMINOPHEN 500 MG
500 TABLET ORAL ONCE
Refills: 0 | Status: COMPLETED | OUTPATIENT
Start: 2020-04-24 | End: 2020-04-24

## 2020-04-24 RX ORDER — INSULIN LISPRO 100/ML
VIAL (ML) SUBCUTANEOUS AT BEDTIME
Refills: 0 | Status: DISCONTINUED | OUTPATIENT
Start: 2020-04-24 | End: 2020-04-25

## 2020-04-24 RX ORDER — TOCILIZUMAB 20 MG/ML
400 INJECTION, SOLUTION, CONCENTRATE INTRAVENOUS ONCE
Refills: 0 | Status: COMPLETED | OUTPATIENT
Start: 2020-04-24 | End: 2020-04-24

## 2020-04-24 RX ORDER — INSULIN GLARGINE 100 [IU]/ML
6 INJECTION, SOLUTION SUBCUTANEOUS EVERY MORNING
Refills: 0 | Status: DISCONTINUED | OUTPATIENT
Start: 2020-04-24 | End: 2020-04-29

## 2020-04-24 RX ORDER — DEXTROSE 50 % IN WATER 50 %
12.5 SYRINGE (ML) INTRAVENOUS ONCE
Refills: 0 | Status: DISCONTINUED | OUTPATIENT
Start: 2020-04-24 | End: 2020-04-30

## 2020-04-24 RX ORDER — DEXTROSE 50 % IN WATER 50 %
15 SYRINGE (ML) INTRAVENOUS ONCE
Refills: 0 | Status: DISCONTINUED | OUTPATIENT
Start: 2020-04-24 | End: 2020-04-30

## 2020-04-24 RX ORDER — ALBUTEROL 90 UG/1
1 AEROSOL, METERED ORAL ONCE
Refills: 0 | Status: COMPLETED | OUTPATIENT
Start: 2020-04-24 | End: 2020-04-24

## 2020-04-24 RX ORDER — SODIUM CHLORIDE 9 MG/ML
1000 INJECTION, SOLUTION INTRAVENOUS
Refills: 0 | Status: DISCONTINUED | OUTPATIENT
Start: 2020-04-24 | End: 2020-04-30

## 2020-04-24 RX ORDER — AMPICILLIN TRIHYDRATE 250 MG
1 CAPSULE ORAL EVERY 8 HOURS
Refills: 0 | Status: DISCONTINUED | OUTPATIENT
Start: 2020-04-24 | End: 2020-04-30

## 2020-04-24 RX ADMIN — ALBUTEROL 2 PUFF(S): 90 AEROSOL, METERED ORAL at 17:57

## 2020-04-24 RX ADMIN — ALBUTEROL 2 PUFF(S): 90 AEROSOL, METERED ORAL at 05:21

## 2020-04-24 RX ADMIN — ALBUTEROL 1 PUFF(S): 90 AEROSOL, METERED ORAL at 01:00

## 2020-04-24 RX ADMIN — Medication 650 MILLIGRAM(S): at 02:44

## 2020-04-24 RX ADMIN — ALBUTEROL 2 PUFF(S): 90 AEROSOL, METERED ORAL at 12:57

## 2020-04-24 RX ADMIN — Medication 108 GRAM(S): at 17:24

## 2020-04-24 RX ADMIN — ENOXAPARIN SODIUM 40 MILLIGRAM(S): 100 INJECTION SUBCUTANEOUS at 17:16

## 2020-04-24 RX ADMIN — Medication 500 MILLIGRAM(S): at 12:34

## 2020-04-24 RX ADMIN — Medication 81 MILLIGRAM(S): at 12:34

## 2020-04-24 RX ADMIN — ENOXAPARIN SODIUM 40 MILLIGRAM(S): 100 INJECTION SUBCUTANEOUS at 05:20

## 2020-04-24 RX ADMIN — Medication 2: at 17:16

## 2020-04-24 RX ADMIN — ZINC SULFATE TAB 220 MG (50 MG ZINC EQUIVALENT) 220 MILLIGRAM(S): 220 (50 ZN) TAB at 12:34

## 2020-04-24 RX ADMIN — TOCILIZUMAB 100 MILLIGRAM(S): 20 INJECTION, SOLUTION, CONCENTRATE INTRAVENOUS at 14:43

## 2020-04-24 RX ADMIN — INSULIN GLARGINE 6 UNIT(S): 100 INJECTION, SOLUTION SUBCUTANEOUS at 09:10

## 2020-04-24 RX ADMIN — Medication 200 MILLIGRAM(S): at 10:10

## 2020-04-24 NOTE — CONSULT NOTE ADULT - SUBJECTIVE AND OBJECTIVE BOX
Pulmonary Consult Note     84 year old man with DM2 and hyperlipidemia, known COVID-19 positive with recent ED visit, brought by EMS from home for blood culture growing gram positive rods in 1/4 bottles, consistent with corynebacterium      He completed a Z-pack and has taken 3 days of Plaquenil.     he is on NC    there is concern for persistent fever and hypoxia    CXR reveals faint infiltrates, not severe    he has been treated with hydroxychloroquine     He Has been given tocilizumab today        FH: heart disease  hypertension  Chronic GERD  Obesity, Class I, BMI 30-34.9  Carpal tunnel syndrome of right wrist  BPH (benign prostatic hypertrophy)  Diabetes mellitus, type 2  Encephalopathy acute  Blood culture positive for microorganism  COVID-19 virus infection  History of carpal tunnel surgery of right wrist  S/P hernia repair    Vital Signs Last 24 Hrs  T(C): 37.4 (24 Apr 2020 09:21), Max: 38.3 (24 Apr 2020 02:00)  T(F): 99.3 (24 Apr 2020 09:21), Max: 101 (24 Apr 2020 02:00)  HR: 91 (24 Apr 2020 08:18) (90 - 101)  BP: 143/83 (24 Apr 2020 08:18) (122/74 - 152/84)  BP(mean): --  RR: 26 (24 Apr 2020 09:21) (20 - 26)  SpO2: 93% (24 Apr 2020 10:00) (87% - 96%)    lungs equal diminished unlabored  cor rrr  abd nt soft no hsm  extr no edema     Xray Chest 1 View- PORTABLE-Urgent (04.21.20 @ 02:50) >  Faint bilateral patchy opacities, suggestive of infection.  There is no pneumothorax.  The cardiac silhouette size cannot be accurately assessed on this radiograph.  There is no acute abnormality of the visualized osseous structures.    IMPRESSION:    Faint bilateral patchy opacities, suggestive of infection.            D-Dimer Assay, Quantitative: 194:  (04.24.20 @ 03:35)    C-Reactive Protein, Serum: 11.33 mg/dL (04.24.20 @ 04:33)      Ferritin, Serum: 552 ng/mL (04.24.20 @ 04:33)      IMPRESSION    sepsi    bacteremia, unclear source of corynebacterium  probable UTI as well   hx COVID-19 infection with pneumonia  Respiratory failure presumed due to COVID-19 infection   sp tocilizumab today, hydroxychloroquine prior    REC  agree with IV antibiotics    continue on 100% NRB O2   Continue to monitor respiratory status and parameters of systemic inflammation and abnormal coagulation   dvt prophylaxis    Addi Castro MD  Pulmonary

## 2020-04-24 NOTE — CHART NOTE - NSCHARTNOTEFT_GEN_A_CORE
84 m with DM, HLD admitted 4/21 for COVID, normal WBC  CRP: 11  ferritin: 552, procalcitonin: 0.10  blood culture showed corynebacterium coleae and corynebacterium riegellii on 4/19 and repeat is negative 4/21 and 4/22  urine cx with E. faecalis  pt febrile overnight and worsened respiratory status    fever, hypoxic respiratory failure due to COVID  corynebacterium bacteremia is a contaminant and repeat cx negative  urine cx with E. faecalis, ?UTI vs bacteriuria    * s/p outpatient course of plaquenil  * started on amp for the urine, can complete a 5 day course  * pt does not meet criteria for the cytokine storm  * not a remdesivir candidate as the he has been positive for >4 days  * consider plasma

## 2020-04-24 NOTE — PROGRESS NOTE ADULT - SUBJECTIVE AND OBJECTIVE BOX
Leanne Ace MD  Pager  [place] [phone]  [phone] (nights and weekends)    SUBJECTIVE:  pt with fevers again overnight -blood cx sent   continues on NRB  unable to engage the pt in pt interview even with phone            MEDICATIONS  (STANDING):  ALBUTerol    90 MICROgram(s) HFA Inhaler 2 Puff(s) Inhalation every 6 hours  ascorbic acid 500 milliGRAM(s) Oral daily  aspirin  chewable 81 milliGRAM(s) Oral daily  enoxaparin Injectable 40 milliGRAM(s) SubCutaneous two times a day  insulin glargine Injectable (LANTUS) 6 Unit(s) SubCutaneous every morning  insulin lispro (HumaLOG) corrective regimen sliding scale   SubCutaneous three times a day before meals  tocilizumab IVPB 400 milliGRAM(s) IV Intermittent once  zinc sulfate 220 milliGRAM(s) Oral daily    MEDICATIONS  (PRN):  acetaminophen   Tablet .. 650 milliGRAM(s) Oral every 4 hours PRN Temp greater or equal to 38.5C (101.3F)  benzonatate 100 milliGRAM(s) Oral three times a day PRN Cough  glucagon  Injectable 1 milliGRAM(s) IntraMuscular once PRN Glucose LESS THAN 70 milligrams/deciliter  nystatin Powder 1 Application(s) Topical three times a day PRN rash MAD      Vital Signs Last 24 Hrs  T(C): 37.4 (2020 09:21), Max: 38.3 (2020 02:00)  T(F): 99.3 (2020 09:21), Max: 101 (2020 02:00)  HR: 91 (2020 08:18) (90 - 101)  BP: 143/83 (2020 08:18) (122/74 - 152/84)  BP(mean): --  RR: 26 (2020 09:21) (20 - 26)  SpO2: 93% (2020 10:00) (87% - 96%)    CAPILLARY BLOOD GLUCOSE      POCT Blood Glucose.: 194 mg/dL (2020 12:19)  POCT Blood Glucose.: 196 mg/dL (2020 08:24)  POCT Blood Glucose.: 205 mg/dL (2020 17:34)    I&O's Summary    2020 07:01  -  2020 07:00  --------------------------------------------------------  IN: 660 mL / OUT: 450 mL / NET: 210 mL    2020 07:01  -  2020 12:50  --------------------------------------------------------  IN: 120 mL / OUT: 125 mL / NET: -5 mL        PHYSICAL EXAM:  GENERAL: Looks stated age,  CARDIOVASCULAR: Normal S1, S2  PULMONARY: on NRB, moving air bilaterally   GI: Abdomen non-distended, soft, Nontender.  Bowel sounds present  MSK/Ext:  No leg edema.  No calf tenderness bilaterally      LABS:                        13.1   7.70  )-----------( 209      ( 2020 03:35 )             42.2     04-24    139  |  101  |  24<H>  ----------------------------<  219<H>  4.6   |  24  |  1.01    Ca    8.9      2020 03:35  Phos  2.4     04-24  Mg     2.3     04-24    TPro  7.5  /  Alb  3.3  /  TBili  0.7  /  DBili  x   /  AST  76<H>  /  ALT  38  /  AlkPhos  69  04-24      CARDIAC MARKERS ( 2020 03:35 )  x     / x     / 64 U/L / x     / 1.8 ng/mL      Urinalysis Basic - ( 2020 14:40 )    Color: Yellow / Appearance: Clear / S.021 / pH: x  Gluc: x / Ketone: Trace  / Bili: Negative / Urobili: <2 mg/dL   Blood: x / Protein: 100 mg/dL / Nitrite: Negative   Leuk Esterase: Negative / RBC: 9 /HPF / WBC 2 /HPF   Sq Epi: x / Non Sq Epi: 2 /HPF / Bacteria: Negative          RADIOLOGY & ADDITIONAL TESTS:

## 2020-04-24 NOTE — OCCUPATIONAL THERAPY INITIAL EVALUATION ADULT - PERTINENT HX OF CURRENT PROBLEM, REHAB EVAL
83 yo M Faroese speaking, DM2 and hyperlipidemia, known COVID-19 positive with recent ED visit, brought by EMS from home for blood culture growing gram positive rods in 1/4 bottles. Pt has 7 days of SOB, cough, fevers, weakness and AMS. Presented to ED for presyncope on 4/18, at which time cultures were drawn. See below

## 2020-04-24 NOTE — PROGRESS NOTE ADULT - ASSESSMENT
84 M Greek speaking, DM2 and hyperlipidemia, known COVID-19 positive with recent ED visit, brought by EMS from home for blood culture growing gram positive rods consistent with 2 different species of corneybacterium  in 2/4 bottles.   His sons also note altered mental status over last few days PTA, AMS improved inpt, pt is awake and alert. CT head without acute findings 4/23   Regarding COVID, pt has now finished plaqunil course which was started outpt and now   4/22-- pt continues to have fever and 2L NC  4/23- 02 requiremetns increasing. Now on NRB, pt taking off mask at times, needs to be placed in restraints. Plasma trial emailed for eligibility   4/24- continues on NRB- to be given [place] today       #COVID -19 infection, #Viral Pneumonia 2/2 Novel Coronavirus Infection (COVID-19):  pt completed Plaquenil course from outpt to inpt  4/22  --Closely monitor respiratory status and escalate O2 therapy as necessary to maintain SpO2 92-96%; per institution policy, will escalate NC -> 100% NRB -> intubation (avoiding BiPAP/CPAP/High Flow given risk of aerosolizing virus). use continuous oximetry monitoring if/when on NRB.  -consider prone positioning if the pt is able to tolerate this to help with oxygenation    --c/w daily lovenox given increased hypercoagulable state in COVID-19 patients (BMI > 30 and CrCl > 15ml/min: Lovenox 40mg SQ BID)- monitor platelets   -monitor inflammotroy markers [id number] to monitor disease progression (procalcitonin, CRP, ESR, ferritin, coags, D-dimer, LDH)   --Acetaminophen 650 mg PO q4h PRN fever. Limit use of NSAIDs.  --HFA albuterol Q6 hour PRN via MDI. Would avoid nebulized preparations to limit risk of aerosol formation.  --per d/w ID and Pulmology, bc the pt remains on NRB for extended period of time >12hrs and increasing markers will dose of tocizulmab   --consult pulmonology today given pts worsening hypoxia and need of NRB  -Monitor daily (or as needed): CBC, CMP, Mg, Phos, CPK  --Maintain isolation precautions with contact/airborne  --Limit IVF given risk of ARDS      #bacteremia  #GNR Bacteremia in 2/4 cultures from 4/19- consistent with two different cornebacterium  - patient completed course of azithromycin prior to positive culture result  - followup repeat cultures (sent on 4/21)-- thus far no growth to date   - for now as d/w ID, will hold off on abx for now unless pt is HD unstable       #encephalopathy/Altered mental status at admission   - likely in setting of infection  - frequent orientation  -  CTH without acute changes, d/w neuroogy regarding this. could be hospital delirium vs encephlapathy seen with covid   -will need to d/w neurology if any further workup is needed for the encephalpathy   -Impression:  Head CT without contrast  1.  No acute intracranial hemorrhage, extra-axial collection, hydrocephalus, midline shift or space-occupying mass lesion.  2.  No convincing loss of gray-white junction or wedge-shaped infarct. 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        #type 2DM  - hold home meds for now  -A1C 8.4- on two oral agents, A1C goal for 81 year old is close to 8, can followup outpt for this with his PCP   -goal FSBG 100-180 and pt has remained in this range without the use of insulin   FSBG premeals   -low dose ISS for now  -start lantus 5 units,   if consistent PO intake ( pt does not eat well by himself during my visits), and if FSBG >250 X2 add 2 units lispro before meals   -diabetic diet         #HTN  monitor goal <130/80 in DM   not on home meds for HTN  monitor BP for now. if remains elevated can start low dose norvasc as needed       #DVT Prophylaxis  --Given increased hypercoagulable state in COVID-19 patients, c/w Lovenox 40 BID : BMI > 30 and CrCl > 15ml/min:  --Will consider need for extended prophylaxis prior to discharge based on D-Dimer and calculated VTE risk.      #DC planning/care coordination  - FULL CODE per discussion with sons  4/22 son Jewel updated about fathers status and clinical course, son expressed mom also sick with COVID and d/w son that he should talk to PCP about treatment and monitoring at home as pt and steps to take to prevent hospitlization for mom if possible   4/23- son tapan updated about pts clinical course and increasing 02 requirement with NRB  4/24- [phone] holden Hunter updated about clinical course and need for Il-6 and that pt remains on  NRB since yesterday.   -Eventual plan for discharge home with proper post-hospitalization instructions, including self-isolation at home, close monitoring of symptoms, etc.   -PT eval/case management       plan of care d/w 7 [facility]      Diagnosis Codes:   J96.01 Acute respiratory failure with hypoxia;   U07.1 2019 Novel Coronavirus (COVID-19);   J12.89 Other viral pneumonia  CPT Code: Moderate Complexity 27863 84 M [place] speaking, DM2 and hyperlipidemia, known COVID-19 positive with recent ED visit, brought by EMS from home for blood culture growing gram positive rods consistent with 2 different species of corneybacterium  in 2/4 bottles.   His sons also note altered mental status over last few days PTA, AMS improved inpt, pt is awake and alert. CT head without acute findings 4/23   c/b worsening hypoxia needing NRB since 4/23    Regarding COVID, pt has now finished plaqunil course which was started outpt and now   4/22-- pt continues to have fever and 2L NC  4/23- 02 requiremetns increasing. Now on NRB, pt taking off mask at times, needs to be placed in restraints. Plasma trial emailed for eligibility   4/24- continues on NRB- to be given [place] today       #COVID -19 infection, #Viral Pneumonia 2/2 Novel Coronavirus Infection (COVID-19):  pt completed Plaquenil course from outpt to inpt  4/22  --Closely monitor respiratory status and escalate O2 therapy as necessary to maintain SpO2 92-96%; per institution policy, will escalate NC -> 100% NRB -> intubation (avoiding BiPAP/CPAP/High Flow given risk of aerosolizing virus). use continuous oximetry monitoring if/when on NRB.  -consider prone positioning if the pt is able to tolerate this to help with oxygenation    --c/w daily lovenox given increased hypercoagulable state in COVID-19 patients (BMI > 30 and CrCl > 15ml/min: Lovenox 40mg SQ BID)- monitor platelets   -monitor inflammotroy markers [id number] to monitor disease progression (procalcitonin, CRP, ESR, ferritin, coags, D-dimer, LDH)   --Acetaminophen 650 mg PO q4h PRN fever. Limit use of NSAIDs.  --HFA albuterol Q6 hour PRN via MDI. Would avoid nebulized preparations to limit risk of aerosol formation.  --per d/w ID and Pulmology, bc the pt remains on NRB for extended period of time >12hrs and increasing markers will dose of tocizulmab   --consult pulmonology today given pts worsening hypoxia and need of NRB  -Monitor daily (or as needed): CBC, CMP, Mg, Phos, CPK  --Maintain isolation precautions with contact/airborne  --Limit IVF given risk of ARDS      #bacteremia  #GNR Bacteremia in 2/4 cultures from 4/19- consistent with two different cornebacterium  - patient completed course of azithromycin prior to positive culture result  - followup repeat cultures (sent on 4/21)-- thus far no growth to date   -UCX concerning for enterocouccus, though less than 99,000 would start abx given his fevers and now worsening clinical status. will discuss with ID regarding abx      #encephalopathy/Altered mental status at admission   - likely in setting of infection  - frequent orientation  -  CTH without acute changes, d/w neuroogy regarding this. could be hospital delirium vs encephlapathy seen with covid   -will need to d/w neurology if any further workup is needed for the encephalpathy   -Impression:  Head CT without contrast  1.  No acute intracranial hemorrhage, extra-axial collection, hydrocephalus, midline shift or space-occupying mass lesion.  2.  No convincing loss of gray-white junction or wedge-shaped infarct. 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        #type 2DM  - hold home meds for now  -A1C 8.4- on two oral agents, A1C goal for 81 year old is close to 8, can followup outpt for this with his PCP   -goal FSBG 100-180 and pt has remained in this range without the use of insulin   FSBG premeals   -low dose ISS for now  -start lantus 5 units,   if consistent PO intake ( pt does not eat well by himself during my visits), and if FSBG >250 X2 add 2 units lispro before meals   -diabetic diet         #HTN  monitor goal <130/80 in DM   not on home meds for HTN  monitor BP for now. if remains elevated can start low dose norvasc as needed       #DVT Prophylaxis  --Given increased hypercoagulable state in COVID-19 patients, c/w Lovenox 40 BID : BMI > 30 and CrCl > 15ml/min:  --Will consider need for extended prophylaxis prior to discharge based on D-Dimer and calculated VTE risk.      #DC planning/care coordination  - FULL CODE per discussion with sons  4/22 son Jewel updated about fathers status and clinical course, son expressed mom also sick with COVID and d/w son that he should talk to PCP about treatment and monitoring at home as pt and steps to take to prevent hospitlization for mom if possible   4/23- son tapan updated about pts clinical course and increasing 02 requirement with NRB  4/24- [phone] holden Hunter updated about clinical course and need for Il-6 and that pt remains on  NRB since yesterday.   -Eventual plan for discharge home with proper post-hospitalization instructions, including self-isolation at home, close monitoring of symptoms, etc.   -PT eval/case management       plan of care d/w 7 [facility]      Diagnosis Codes:   J96.01 Acute respiratory failure with hypoxia;   U07.1 2019 Novel Coronavirus (COVID-19);   J12.89 Other viral pneumonia  CPT Code: Moderate Complexity 22216

## 2020-04-24 NOTE — CHART NOTE - NSCHARTNOTEFT_GEN_A_CORE
Nutrition Initial Assessment    Nutrition Consult Received: Yes [ x  ]  No [   ]    Reason for Initial Nutrition Assessment: assessment    Source of Information: Unable to conduct a fact to face interview due to limited contact restrictions related to pt's medical condition and isolation precautions. Information obtained from a phone call with the pt via  phone, PCA, nurse manger, son (Mau) and from the EMR.    Admitting Diagnosis: 84y Male admitted for Blood culture positive for microorganism    PAST MEDICAL & SURGICAL HISTORY:  Mild hypertension: no po meds  Chronic GERD  Obesity, Class I, BMI 30-34.9  Carpal tunnel syndrome of right wrist: 3/19/19  BPH (benign prostatic hypertrophy)  Diabetes mellitus, type 2  History of carpal tunnel surgery of right wrist  S/P hernia repair: approx 2013  S/P right cataract extraction: approx 2014  S/P left cataract extraction: approx 2014      Subjective Information: pt was having a difficulty speaking when RD attempted to utilize the  as per PCA. nurse manager encouraged family to bring food in for the pt. RD spoke with son. pt was eating well up until 1 day PTA. pt lives with wife and a home attendant prepares the meals. pt avoids sweets, rice. macaroni, fruit secondary to diabetes. he usually eats 3 meals daily. RD obtained preferences and will provide in CBORD. son agreed to supplement. As per son, pt needs total assistance with meals.       GI Issues:  Last BM: none noted    Current Nutrition Order:  PO Intake:   Good (%) [   ]    Fair (50-75%) [   ]    Poor (<50%) [ x  ]    Skin Integrity:  no pressure injury  no edema    Labs:   04-24 Na139 mmol/L Glu 219 mg/dL<H> K+ 4.6 mmol/L Cr  1.01 mg/dL BUN 24 mg/dL<H> Phos 2.4 mg/dL<L>   4/22 A1c-8.7        POCT Blood Glucose.: 196 mg/dL (04-24-20 @ 08:24)  POCT Blood Glucose.: 205 mg/dL (04-23-20 @ 17:34)  POCT Blood Glucose.: 203 mg/dL (04-23-20 @ 12:44)  POCT Blood Glucose.: 221 mg/dL (04-23-20 @ 10:10)    Medications:  MEDICATIONS  (STANDING):  ALBUTerol    90 MICROgram(s) HFA Inhaler 2 Puff(s) Inhalation every 6 hours  ascorbic acid 500 milliGRAM(s) Oral daily  aspirin  chewable 81 milliGRAM(s) Oral daily  enoxaparin Injectable 40 milliGRAM(s) SubCutaneous two times a day  insulin glargine Injectable (LANTUS) 6 Unit(s) SubCutaneous every morning  insulin lispro (HumaLOG) corrective regimen sliding scale   SubCutaneous three times a day before meals  zinc sulfate 220 milliGRAM(s) Oral daily    MEDICATIONS  (PRN):  acetaminophen   Tablet .. 650 milliGRAM(s) Oral every 4 hours PRN Temp greater or equal to 38.5C (101.3F)  benzonatate 100 milliGRAM(s) Oral three times a day PRN Cough  glucagon  Injectable 1 milliGRAM(s) IntraMuscular once PRN Glucose LESS THAN 70 milligrams/deciliter  nystatin Powder 1 Application(s) Topical three times a day PRN rash MAD    Admitted Anthropometrics:    Height (cm): 157.48 (04-21-20 @ 01:44), 157.48 (04-18-20 @ 23:15)  Weight (kg): 82.1 (04-21-20 @ 11:57), 77.1 (04-18-20 @ 23:15)  BMI (kg/m2): 33.1 (04-21-20 @ 11:57), 31.1 (04-21-20 @ 01:44), 31.1 (04-18-20 @ 23:15)    Nutrition Focused Physical Exam: Unable to complete due to limited isolation contact precautions at this time.     BMI: 34.1  IBW +/- 10%: 112pounds        Estimated Energy Needs (_25_ kcal/kg- _30__ kcal/kg): 1400-1680kcal  Estimated Protein Needs (_1.0_ g/kg- 1.2___ g/kg): 56-67kg  Based on weight of: 123.2pounds/56kg    [x  ] Nutrition Diagnosis:  Inadequate protein -energy intake  changes in GI function  as evidenced by decreased po intake.    Goal:  pt will meet nutrient needs via meals, supplement and food brought in from home.       Recommendations/Interventions:  bowel regimen since no BM noted since admission, RD provided prunes  continue consistent carbohydrate with snack  RD added diet mighty shakes with meals, monitor tolerance  provide education at an appropriate time      RD to follow-up per protocol.    Eda Marcos MA, RD, CDN   beeper # [phone]

## 2020-04-24 NOTE — OCCUPATIONAL THERAPY INITIAL EVALUATION ADULT - LIVES WITH, PROFILE
Patient lives with spouse in an apt with elevator access, is independent with ADL's and a community ambulator with rolling walker.

## 2020-04-24 NOTE — OCCUPATIONAL THERAPY INITIAL EVALUATION ADULT - PLANNED THERAPY INTERVENTIONS, OT EVAL
ADL retraining/balance training/bed mobility training/transfer training/cognitive, visual perceptual/strengthening

## 2020-04-25 LAB
ALBUMIN SERPL ELPH-MCNC: 3.1 G/DL — LOW (ref 3.3–5)
ALP SERPL-CCNC: 63 U/L — SIGNIFICANT CHANGE UP (ref 40–120)
ALT FLD-CCNC: 45 U/L — SIGNIFICANT CHANGE UP (ref 10–45)
ANION GAP SERPL CALC-SCNC: 13 MMOL/L — SIGNIFICANT CHANGE UP (ref 5–17)
AST SERPL-CCNC: 92 U/L — HIGH (ref 10–40)
BILIRUB SERPL-MCNC: 0.5 MG/DL — SIGNIFICANT CHANGE UP (ref 0.2–1.2)
BLD GP AB SCN SERPL QL: NEGATIVE — SIGNIFICANT CHANGE UP
BUN SERPL-MCNC: 38 MG/DL — HIGH (ref 7–23)
CALCIUM SERPL-MCNC: 8.7 MG/DL — SIGNIFICANT CHANGE UP (ref 8.4–10.5)
CHLORIDE SERPL-SCNC: 103 MMOL/L — SIGNIFICANT CHANGE UP (ref 96–108)
CO2 SERPL-SCNC: 27 MMOL/L — SIGNIFICANT CHANGE UP (ref 22–31)
CREAT SERPL-MCNC: 1.22 MG/DL — SIGNIFICANT CHANGE UP (ref 0.5–1.3)
GLUCOSE BLDC GLUCOMTR-MCNC: 155 MG/DL — HIGH (ref 70–99)
GLUCOSE BLDC GLUCOMTR-MCNC: 165 MG/DL — HIGH (ref 70–99)
GLUCOSE BLDC GLUCOMTR-MCNC: 180 MG/DL — HIGH (ref 70–99)
GLUCOSE SERPL-MCNC: 146 MG/DL — HIGH (ref 70–99)
HCT VFR BLD CALC: 42 % — SIGNIFICANT CHANGE UP (ref 39–50)
HGB BLD-MCNC: 12.8 G/DL — LOW (ref 13–17)
MCHC RBC-ENTMCNC: 27 PG — SIGNIFICANT CHANGE UP (ref 27–34)
MCHC RBC-ENTMCNC: 30.5 GM/DL — LOW (ref 32–36)
MCV RBC AUTO: 88.6 FL — SIGNIFICANT CHANGE UP (ref 80–100)
NRBC # BLD: 0 /100 WBCS — SIGNIFICANT CHANGE UP (ref 0–0)
PLATELET # BLD AUTO: 232 K/UL — SIGNIFICANT CHANGE UP (ref 150–400)
POTASSIUM SERPL-MCNC: 4.5 MMOL/L — SIGNIFICANT CHANGE UP (ref 3.5–5.3)
POTASSIUM SERPL-SCNC: 4.5 MMOL/L — SIGNIFICANT CHANGE UP (ref 3.5–5.3)
PROT SERPL-MCNC: 7 G/DL — SIGNIFICANT CHANGE UP (ref 6–8.3)
RBC # BLD: 4.74 M/UL — SIGNIFICANT CHANGE UP (ref 4.2–5.8)
RBC # FLD: 13.8 % — SIGNIFICANT CHANGE UP (ref 10.3–14.5)
RH IG SCN BLD-IMP: NEGATIVE — SIGNIFICANT CHANGE UP
SODIUM SERPL-SCNC: 143 MMOL/L — SIGNIFICANT CHANGE UP (ref 135–145)
WBC # BLD: 4.9 K/UL — SIGNIFICANT CHANGE UP (ref 3.8–10.5)
WBC # FLD AUTO: 4.9 K/UL — SIGNIFICANT CHANGE UP (ref 3.8–10.5)

## 2020-04-25 PROCEDURE — 99232 SBSQ HOSP IP/OBS MODERATE 35: CPT

## 2020-04-25 PROCEDURE — 99233 SBSQ HOSP IP/OBS HIGH 50: CPT

## 2020-04-25 RX ORDER — SENNA PLUS 8.6 MG/1
2 TABLET ORAL AT BEDTIME
Refills: 0 | Status: DISCONTINUED | OUTPATIENT
Start: 2020-04-25 | End: 2020-04-30

## 2020-04-25 RX ORDER — SODIUM CHLORIDE 0.65 %
1 AEROSOL, SPRAY (ML) NASAL ONCE
Refills: 0 | Status: DISCONTINUED | OUTPATIENT
Start: 2020-04-25 | End: 2020-04-30

## 2020-04-25 RX ORDER — TOCILIZUMAB 20 MG/ML
800 INJECTION, SOLUTION, CONCENTRATE INTRAVENOUS ONCE
Refills: 0 | Status: DISCONTINUED | OUTPATIENT
Start: 2020-04-25 | End: 2020-04-25

## 2020-04-25 RX ORDER — FAMOTIDINE 10 MG/ML
40 INJECTION INTRAVENOUS
Refills: 0 | Status: DISCONTINUED | OUTPATIENT
Start: 2020-04-25 | End: 2020-04-30

## 2020-04-25 RX ORDER — LACTULOSE 10 G/15ML
10 SOLUTION ORAL ONCE
Refills: 0 | Status: COMPLETED | OUTPATIENT
Start: 2020-04-25 | End: 2020-04-25

## 2020-04-25 RX ADMIN — Medication 1: at 13:08

## 2020-04-25 RX ADMIN — ALBUTEROL 2 PUFF(S): 90 AEROSOL, METERED ORAL at 00:47

## 2020-04-25 RX ADMIN — LACTULOSE 10 GRAM(S): 10 SOLUTION ORAL at 13:06

## 2020-04-25 RX ADMIN — Medication 108 GRAM(S): at 20:16

## 2020-04-25 RX ADMIN — Medication 81 MILLIGRAM(S): at 13:07

## 2020-04-25 RX ADMIN — ALBUTEROL 2 PUFF(S): 90 AEROSOL, METERED ORAL at 23:19

## 2020-04-25 RX ADMIN — Medication 500 MILLIGRAM(S): at 13:07

## 2020-04-25 RX ADMIN — Medication 1: at 08:33

## 2020-04-25 RX ADMIN — ENOXAPARIN SODIUM 40 MILLIGRAM(S): 100 INJECTION SUBCUTANEOUS at 17:19

## 2020-04-25 RX ADMIN — Medication 108 GRAM(S): at 04:42

## 2020-04-25 RX ADMIN — ALBUTEROL 2 PUFF(S): 90 AEROSOL, METERED ORAL at 13:08

## 2020-04-25 RX ADMIN — SENNA PLUS 2 TABLET(S): 8.6 TABLET ORAL at 20:15

## 2020-04-25 RX ADMIN — ZINC SULFATE TAB 220 MG (50 MG ZINC EQUIVALENT) 220 MILLIGRAM(S): 220 (50 ZN) TAB at 13:07

## 2020-04-25 RX ADMIN — Medication 108 GRAM(S): at 13:07

## 2020-04-25 RX ADMIN — ALBUTEROL 2 PUFF(S): 90 AEROSOL, METERED ORAL at 04:43

## 2020-04-25 RX ADMIN — ALBUTEROL 2 PUFF(S): 90 AEROSOL, METERED ORAL at 17:20

## 2020-04-25 RX ADMIN — Medication 1: at 17:19

## 2020-04-25 RX ADMIN — INSULIN GLARGINE 6 UNIT(S): 100 INJECTION, SOLUTION SUBCUTANEOUS at 08:32

## 2020-04-25 RX ADMIN — ENOXAPARIN SODIUM 40 MILLIGRAM(S): 100 INJECTION SUBCUTANEOUS at 04:42

## 2020-04-25 RX ADMIN — FAMOTIDINE 40 MILLIGRAM(S): 10 INJECTION INTRAVENOUS at 17:18

## 2020-04-25 NOTE — CHART NOTE - NSCHARTNOTEFT_GEN_A_CORE
The trial, [id number]: Expanded Access to Convalescent Plasma for the Treatment of Patients with COVID-19, was discussed with LAR, son, Dale Mcdaniel. Consent was witnessed by sub-investigator, Dr. Marcello Hernandez.     During the consent process, the following was discussed:     •	The fact that the study involves research  •	The study schedule and procedures involved  •	The main risks of the study, and the fact that all risks may not be known at this time  •	New information that may affect the subject’s willingness to continue on the study will be presented as soon as it is available  •	Benefits of participating  •	Alternatives to participating  •	Confidentiality   •	Compensation for research-related injury  •	Contacts for questions about the study or their rights while on the study  •	The fact that the subject’s participation is voluntary – they can refuse or withdraw at any time without penalty or loss of benefits.    The LAR was given ample time to ask questions. All questions were answered to their satisfaction.    Investigator sent consent to the son via email. Son responded to confirm receipt and consent for his father. Due to the son’s inability to print and return a signed consent form, study team/investigator proceeded with emergency two physician consent. Note confirming second physician agreement was placed in EMR prior to any study procedures being performed.    Informed consent was obtained prior to any study procedures being performed.    Eligibility Criteria:   Patient has laboratory confirmed diagnosis of infection with SARS-CoV-2 on 4/19/2020 and admitted to an acute care facility for treatment of COVID-19 complications.     Patient has severe COVID-19 with the following symptoms:   - Dyspnea    ABO was performed on 4/25/2020 and patient blood type confirmed as B NEGATIVE.     I have confirmed all eligibility are met for this patient to participate.     Convalescent plasma order has been completed, pending plasma from blood bank.

## 2020-04-25 NOTE — CHART NOTE - NSCHARTNOTEFT_GEN_A_CORE
I have reviewed the patient’s situation and concur with them that use of emergency consent (two physician attestation) is appropriate and justified for the study, “Expanded Access to Convalescent Plasma for the Treatment of Patients with COVID-19”. The LAR did not have a means of signing and returning consent documentation. Dr. Bunn was the consenting professional and will be placing orders for this patient.    Marcello Hernandez MD  COVID Plasma Study Team   Tel: [phone]  [email or web address]

## 2020-04-25 NOTE — PROGRESS NOTE ADULT - ATTENDING COMMENTS
as above-  multifactorial resp failure-covid19 pneumonitis, obesity/debility--O2 taper as able-keep sat above 90%  covid19 pneumonitis--s/p plaquenil, s/p 1/3 doses of tocolizumab 800, vit d/c, zn, melatonin  corinybacterial infection-ampicillen   DVT prophylaxis                   GI-pepcid 40 bid               DM-BS control      OOB as able  Donovan Gurrola MD-Pulmonary   [phone]

## 2020-04-25 NOTE — PROGRESS NOTE ADULT - ASSESSMENT
84 M Greek speaking, DM2 and hyperlipidemia, known COVID-19 positive with recent ED visit, brought by EMS from home for blood culture growing gram positive rods consistent with 2 different species of corneybacterium  in 2/4 bottles.   His sons also note altered mental status over last few days PTA, AMS improved inpt, pt is awake and alert. CT head without acute findings 4/23   c/b worsening hypoxia needing NRB since 4/23 s/p toco 4/24, also on abx for emperic UTI  repeat Blood cx with NGTD    Regarding COVID, pt has now finished plaqunil course which was started outpt and now   4/22-- pt continues to have fever and 2L NC  4/23- 02 requiremetns increasing. Now on NRB, pt taking off mask at times, needs to be placed in restraints. Plasma trial emailed for eligibility   4/24- continues on NRB- to be given [place] today   4/25- clincial status unchanged, continues on NRB      #COVID -19 infection, #Viral Pneumonia 2/2 Novel Coronavirus Infection (COVID-19):  pt completed Plaquenil course from outpt to inpt  4/22  now on NC--->NRB since 4/23  --per d/w ID and Pulmology, bc the pt remained on NRB for extended period of time >12hrs and increasing markers was given dose of tocizulmab 4/24  --Closely monitor respiratory status and escalate O2 therapy as necessary to maintain SpO2 92-96%; per institution policy, will escalate NC -> 100% NRB -> intubation (avoiding BiPAP/CPAP/High Flow given risk of aerosolizing virus). use continuous oximetry monitoring if/when on NRB.  -consider prone positioning if the pt is able to tolerate this to help with oxygenation    --c/w daily lovenox given increased hypercoagulable state in COVID-19 patients (BMI > 30 and CrCl > 15ml/min: Lovenox 40mg SQ BID)- monitor platelets   -monitor inflammatory markers [id number] to monitor disease progression (procalcitonin, CRP, ESR, ferritin, coags, D-dimer, LDH)   --Acetaminophen 650 mg PO q4h PRN fever. Limit use of NSAIDs.  --HFA albuterol Q6 hour PRN via MDI. Would avoid nebulized preparations to limit risk of aerosol formation.  -Monitor daily (or as needed): CBC, CMP, Mg, Phos, CPK  --Limit IVF given risk of ARDS  greatly appreciate pulmonology reccs     **pt has also been emailed for plasma trial eligibility**      #bacteremia  #GNR Bacteremia in 2/4 cultures from 4/19- consistent with two different cornebacterium  - patient completed course of azithromycin prior to positive culture result  - followup repeat cultures (sent on 4/21)-- thus far no growth to date   -UCX concerning for enterocouccus, though less than 99,000 started on ampilician for 5 day course (4/24-4/30)      #encephalopathy/Altered mental status at admission   - likely in setting of infection  - frequent orientation  -  CTH without acute changes, d/w neuroogy regarding this. could be hospital delirium vs encephlapathy seen with covid   -will need to d/w neurology if any further workup is needed for the encephalpathy   -Impression:  Head CT without contrast  1.  No acute intracranial hemorrhage, extra-axial collection, hydrocephalus, midline shift or space-occupying mass lesion.  2.  No convincing loss of gray-white junction or wedge-shaped infarct. 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        #type 2DM  - hold home meds for now  -A1C 8.4- on two oral agents, A1C goal for 81 year old is close to 8, can followup outpt for this with his PCP   -goal FSBG 100-180   FSBG premeals   -low dose ISS for now  -lantus 6units daily   if consistent PO intake ( pt does not eat well by himself during my visits), and if FSBG >250 X2 add 2 units lispro before meals   -diabetic diet         #HTN  monitor goal <130/80 in DM   not on home meds for HTN  monitor BP for now. if remains elevated can start low dose norvasc as needed       #constipation  start bowel regimen for the pt       #DVT Prophylaxis  --Given increased hypercoagulable state in COVID-19 patients, c/w Lovenox 40 BID : BMI > 30 and CrCl > 15ml/min:  --Will consider need for extended prophylaxis prior to discharge based on D-Dimer and calculated VTE risk.          #DC planning/care coordination  - FULL CODE per discussion with sons  4/22 son Jewel updated about fathers status and clinical course, son expressed mom also sick with COVID and d/w son that he should talk to PCP about treatment and monitoring at home as pt and steps to take to prevent hospitlization for mom if possible   4/23- son dale updated about pts clinical course and increasing 02 requirement with NRB  4/24- [phone] Dale, holden updated about clinical course and need for Il-6 and that pt remains on  NRB since yesterday.   -Eventual plan for discharge home with proper post-hospitalization instructions, including self-isolation at home, close monitoring of symptoms, etc.   -PT eval/case management       plan of care d/w 7 carloz [place]      Diagnosis Codes:   J96.01 Acute respiratory failure with hypoxia;   U07.1 2019 Novel Coronavirus (COVID-19);   J12.89 Other viral pneumonia  CPT Code: Moderate Complexity 28118 84 M Togolese speaking, DM2 and hyperlipidemia, known COVID-19 positive with recent ED visit, brought by EMS from home for blood culture growing gram positive rods consistent with 2 different species of corneybacterium  in 2/4 bottles.   His sons also note altered mental status over last few days PTA, AMS improved inpt, pt is awake and alert. CT head without acute findings 4/23   c/b worsening hypoxia needing NRB since 4/23 s/p toco 4/24, also on abx for emperic UTI  repeat Blood cx with NGTD    Regarding COVID, pt has now finished plaqunil course which was started outpt and now   4/22-- pt continues to have fever and 2L NC  4/23- 02 requiremetns increasing. Now on NRB, pt taking off mask at times, needs to be placed in restraints. Plasma trial emailed for eligibility   4/24- continues on NRB- to be given [place] today   4/25- clincial status unchanged, continues on NRB      #COVID -19 infection, #Viral Pneumonia 2/2 Novel Coronavirus Infection (COVID-19):  pt completed Plaquenil course from outpt to inpt  4/22  now on NC--->NRB since 4/23  --per d/w ID and Pulmology, bc the pt remained on NRB for extended period of time >12hrs and increasing markers was given dose of tocizulmab 4/24  --Closely monitor respiratory status and escalate O2 therapy as necessary to maintain SpO2 92-96%; per institution policy, will escalate NC -> 100% NRB -> intubation (avoiding BiPAP/CPAP/High Flow given risk of aerosolizing virus). use continuous oximetry monitoring if/when on NRB.  -consider prone positioning if the pt is able to tolerate this to help with oxygenation    --c/w daily lovenox given increased hypercoagulable state in COVID-19 patients (BMI > 30 and CrCl > 15ml/min: Lovenox 40mg SQ BID)- monitor platelets   -monitor inflammatory markers [id number] to monitor disease progression (procalcitonin, CRP, ESR, ferritin, coags, D-dimer, LDH)   --Acetaminophen 650 mg PO q4h PRN fever. Limit use of NSAIDs.  --HFA albuterol Q6 hour PRN via MDI. Would avoid nebulized preparations to limit risk of aerosol formation.  -Monitor daily (or as needed): CBC, CMP, Mg, Phos, CPK  --Limit IVF given risk of ARDS  greatly appreciate pulmonology reccs     **pt has also been emailed for plasma trial eligibility**      #bacteremia  #GNR Bacteremia in 2/4 cultures from 4/19- consistent with two different cornebacterium  - patient completed course of azithromycin prior to positive culture result  - followup repeat cultures (sent on 4/21)-- thus far no growth to date   -UCX concerning for enterocouccus, though less than 99,000 started on ampilician for 5 day course (4/24-4/30)      #encephalopathy/Altered mental status at admission   - likely in setting of infection  - frequent orientation  -  CTH without acute changes, d/w neuroogy regarding this. could be hospital delirium vs encephlapathy seen with covid   -will need to d/w neurology if any further workup is needed for the encephalpathy   -Impression:  Head CT without contrast  1.  No acute intracranial hemorrhage, extra-axial collection, hydrocephalus, midline shift or space-occupying mass lesion.  2.  No convincing loss of gray-white junction or wedge-shaped infarct. 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        #type 2DM  - hold home meds for now  -A1C 8.4- on two oral agents, A1C goal for 81 year old is close to 8, can followup outpt for this with his PCP   -goal FSBG 100-180   FSBG premeals   -low dose ISS for now  -lantus 6units daily   if consistent PO intake ( pt does not eat well by himself during my visits), and if FSBG >250 X2 add 2 units lispro before meals   -diabetic diet         #HTN  monitor goal <130/80 in DM   not on home meds for HTN  monitor BP for now. if remains elevated can start low dose norvasc as needed       #constipation  start bowel regimen for the pt       #DVT Prophylaxis  --Given increased hypercoagulable state in COVID-19 patients, c/w Lovenox 40 BID : BMI > 30 and CrCl > 15ml/min:  --Will consider need for extended prophylaxis prior to discharge based on D-Dimer and calculated VTE risk.          #DC planning/care coordination  - FULL CODE per discussion with sons  4/22 son Jewel updated about fathers status and clinical course, son expressed mom also sick with COVID and d/w son that he should talk to PCP about treatment and monitoring at home as pt and steps to take to prevent hospitlization for mom if possible   4/23- son dale updated about pts clinical course and increasing 02 requirement with NRB  4/24- [phone] Dale, holden updated about clinical course and need for Il-6 and that pt remains on  NRB since yesterday. explained to the son if the pt continues to have hypoxia there is a possibility of intubation   4/25- Dale updated about pts clinical status, ?possible plasma candidate, son consented for the trial   -Eventual plan for discharge home with proper post-hospitalization instructions, including self-isolation at home, close monitoring of symptoms, etc.   -PT eval/case management       plan of care d/w 7 carloz [place]      Diagnosis Codes:   J96.01 Acute respiratory failure with hypoxia;   U07.1 2019 Novel Coronavirus (COVID-19);   J12.89 Other viral pneumonia  CPT Code: Moderate Complexity 71264

## 2020-04-25 NOTE — PROGRESS NOTE ADULT - ASSESSMENT
84 year old man with DM2 and hyperlipidemia, known COVID-19 positive with recent ED visit, brought by EMS from home for blood culture growing gram positive rods in 1/4 bottles, consistent with corynebacterium    s/p plaquenil, tocolizumab x 1  ******************  4/25-arrousable--no signif complaints

## 2020-04-25 NOTE — PROGRESS NOTE ADULT - SUBJECTIVE AND OBJECTIVE BOX
Leanne Ace MD  Pager  [facility] [phone]  [phone] (nights and weekends)    SUBJECTIVE:  please note, unable to communicate with pt with phone . Pt does not answer questions with  via phone   i examined pt and discussed pts current clinical status with nurse and the PA  Due to COVID pandemic, history and symptoms and obtained via discussion with nurse and  and discussion with his sons.  continues on NRB, 15 liters   Vitals with BP otherwise stable  glucose stable  has not had BM as of yet      MEDICATIONS  (STANDING):  ALBUTerol    90 MICROgram(s) HFA Inhaler 2 Puff(s) Inhalation every 6 hours  ampicillin  IVPB 1 Gram(s) IV Intermittent every 8 hours  ascorbic acid 500 milliGRAM(s) Oral daily  aspirin  chewable 81 milliGRAM(s) Oral daily  dextrose 5%. 1000 milliLiter(s) (50 mL/Hr) IV Continuous <Continuous>  dextrose 50% Injectable 12.5 Gram(s) IV Push once  dextrose 50% Injectable 25 Gram(s) IV Push once  dextrose 50% Injectable 25 Gram(s) IV Push once  enoxaparin Injectable 40 milliGRAM(s) SubCutaneous two times a day  famotidine    Tablet 40 milliGRAM(s) Oral two times a day  insulin glargine Injectable (LANTUS) 6 Unit(s) SubCutaneous every morning  insulin lispro (HumaLOG) corrective regimen sliding scale   SubCutaneous three times a day before meals  senna 2 Tablet(s) Oral at bedtime  zinc sulfate 220 milliGRAM(s) Oral daily    MEDICATIONS  (PRN):  acetaminophen   Tablet .. 650 milliGRAM(s) Oral every 4 hours PRN Temp greater or equal to 38.5C (101.3F)  benzonatate 100 milliGRAM(s) Oral three times a day PRN Cough  dextrose 40% Gel 15 Gram(s) Oral once PRN Blood Glucose LESS THAN 70 milliGRAM(s)/deciliter  glucagon  Injectable 1 milliGRAM(s) IntraMuscular once PRN Glucose LESS THAN 70 milligrams/deciliter  nystatin Powder 1 Application(s) Topical three times a day PRN rash MAD  sodium chloride 0.65% Nasal 1 Spray(s) Both Nostrils once PRN Nasal Congestion      Vital Signs Last 24 Hrs  T(C): 36.8 (25 Apr 2020 09:12), Max: 37.1 (24 Apr 2020 17:06)  T(F): 98.3 (25 Apr 2020 09:12), Max: 98.7 (24 Apr 2020 17:06)  HR: 89 (25 Apr 2020 09:12) (86 - 89)  BP: 113/62 (25 Apr 2020 09:12) (112/60 - 120/61)  BP(mean): --  RR: 22 (25 Apr 2020 09:12) (22 - 25)  SpO2: 95% (25 Apr 2020 09:12) (92% - 96%)    CAPILLARY BLOOD GLUCOSE      POCT Blood Glucose.: 155 mg/dL (25 Apr 2020 12:47)  POCT Blood Glucose.: 165 mg/dL (25 Apr 2020 08:16)  POCT Blood Glucose.: 208 mg/dL (24 Apr 2020 20:37)  POCT Blood Glucose.: 229 mg/dL (24 Apr 2020 17:00)    I&O's Summary    24 Apr 2020 07:01  -  25 Apr 2020 07:00  --------------------------------------------------------  IN: 710 mL / OUT: 650 mL / NET: 60 mL    25 Apr 2020 07:01  -  25 Apr 2020 14:06  --------------------------------------------------------  IN: 180 mL / OUT: 0 mL / NET: 180 mL        PHYSICAL EXAM:  GENERAL: Looks stated age,  CARDIOVASCULAR: Normal S1, S2  PULMONARY: on NRB   GI: Abdomen non-distended, soft, Nontender.  Bowel sounds present  MSK/Ext:  No leg edema.  No calf tenderness bilaterally  PSYCH: Normal Affect.       LABS:                        12.8   4.90  )-----------( 232      ( 25 Apr 2020 07:53 )             42.0     04-25    143  |  103  |  38<H>  ----------------------------<  146<H>  4.5   |  27  |  1.22    Ca    8.7      25 Apr 2020 07:53  Phos  2.4     04-24  Mg     2.3     04-24    TPro  7.0  /  Alb  3.1<L>  /  TBili  0.5  /  DBili  x   /  AST  92<H>  /  ALT  45  /  AlkPhos  63  04-25      CARDIAC MARKERS ( 24 Apr 2020 03:35 )  x     / x     / 64 U/L / x     / 1.8 ng/mL            RADIOLOGY & ADDITIONAL TESTS:

## 2020-04-25 NOTE — PROGRESS NOTE ADULT - SUBJECTIVE AND OBJECTIVE BOX
CHIEF COMPLAINT: f/up sob, resp failure, obesity, covid19 pneumonitis--moyer, mild cough    Interval Events: tocolizumab    REVIEW OF SYSTEMS:  Constitutional: No fevers or chills. No weight loss. + fatigue or generalized malaise.  Eyes: No itching or discharge from the eyes  ENT: No ear pain. No ear discharge. No nasal congestion. No post nasal drip. No epistaxis. No throat pain. No sore throat. No difficulty swallowing.   CV: No chest pain. No palpitations. No lightheadedness or dizziness.   Resp: No dyspnea at rest. + dyspnea on exertion. No orthopnea. No wheezing. No cough. No stridor. No sputum production. No chest pain with respiration.  GI: No nausea. No vomiting. No diarrhea.  MSK: No joint pain or pain in any extremities  Integumentary: No skin lesions. No pedal edema.  Neurological: + gross motor weakness. No sensory changes.  [+ ] All other systems negative  [ ] Unable to assess ROS because ________    OBJECTIVE:  ICU Vital Signs Last 24 Hrs  T(C): 36.6 (25 Apr 2020 04:41), Max: 37.4 (24 Apr 2020 09:21)  T(F): 97.9 (25 Apr 2020 04:41), Max: 99.3 (24 Apr 2020 09:21)  HR: 86 (25 Apr 2020 04:41) (86 - 91)  BP: 120/61 (25 Apr 2020 04:41) (112/60 - 143/83)  BP(mean): --  ABP: --  ABP(mean): --  RR: 22 (25 Apr 2020 04:41) (22 - 26)  SpO2: 96% (25 Apr 2020 04:41) (87% - 96%)        04-24 @ 07:01  -  04-25 @ 07:00  --------------------------------------------------------  IN: 710 mL / OUT: 650 mL / NET: 60 mL      CAPILLARY BLOOD GLUCOSE      POCT Blood Glucose.: 208 mg/dL (24 Apr 2020 20:37)      PHYSICAL EXAM: on NRB-NAD  General: Awake, alert, oriented X 3.   HEENT: Atraumatic, normocephalic.                 Mallampatti Grade 3                No nasal congestion.                No tonsillar or pharyngeal exudates.  Lymph Nodes: No palpable lymphadenopathy  Neck: No JVD. No carotid bruit.   Respiratory: Normal chest expansion                         Normal percussion                         Normal and equal air entry                         No wheeze, rhonchi bibasilar insp rales.  Cardiovascular: S1 S2 normal. No murmurs, rubs or gallops.   Abdomen: Soft, non-tender, non-distended. No organomegaly. Normoactive bowel sounds.  Extremities: Warm to touch. Peripheral pulse palpable. No pedal edema.   Skin: No rashes or skin lesions  Neurological: Motor and sensory examination equal and normal in all four extremities.  Psychiatry: Appropriate mood and affect.    HOSPITAL MEDICATIONS:  MEDICATIONS  (STANDING):  ALBUTerol    90 MICROgram(s) HFA Inhaler 2 Puff(s) Inhalation every 6 hours  ampicillin  IVPB 1 Gram(s) IV Intermittent every 8 hours  ascorbic acid 500 milliGRAM(s) Oral daily  aspirin  chewable 81 milliGRAM(s) Oral daily  dextrose 5%. 1000 milliLiter(s) (50 mL/Hr) IV Continuous <Continuous>  dextrose 50% Injectable 12.5 Gram(s) IV Push once  dextrose 50% Injectable 25 Gram(s) IV Push once  dextrose 50% Injectable 25 Gram(s) IV Push once  enoxaparin Injectable 40 milliGRAM(s) SubCutaneous two times a day  insulin glargine Injectable (LANTUS) 6 Unit(s) SubCutaneous every morning  insulin lispro (HumaLOG) corrective regimen sliding scale   SubCutaneous three times a day before meals  insulin lispro (HumaLOG) corrective regimen sliding scale   SubCutaneous at bedtime  zinc sulfate 220 milliGRAM(s) Oral daily    MEDICATIONS  (PRN):  acetaminophen   Tablet .. 650 milliGRAM(s) Oral every 4 hours PRN Temp greater or equal to 38.5C (101.3F)  benzonatate 100 milliGRAM(s) Oral three times a day PRN Cough  dextrose 40% Gel 15 Gram(s) Oral once PRN Blood Glucose LESS THAN 70 milliGRAM(s)/deciliter  glucagon  Injectable 1 milliGRAM(s) IntraMuscular once PRN Glucose LESS THAN 70 milligrams/deciliter  nystatin Powder 1 Application(s) Topical three times a day PRN rash MAD  sodium chloride 0.65% Nasal 1 Spray(s) Both Nostrils once PRN Nasal Congestion      LABS:                        13.1   7.70  )-----------( 209      ( 24 Apr 2020 03:35 )             42.2     04-24    139  |  101  |  24<H>  ----------------------------<  219<H>  4.6   |  24  |  1.01    Ca    8.9      24 Apr 2020 03:35  Phos  2.4     04-24  Mg     2.3     04-24    TPro  7.5  /  Alb  3.3  /  TBili  0.7  /  DBili  x   /  AST  76<H>  /  ALT  38  /  AlkPhos  69  04-24              MICROBIOLOGY:     RADIOLOGY:  [ ] Reviewed and interpreted by me    Point of Care Ultrasound Findings:    PFT:    EKG:

## 2020-04-26 LAB
BLD GP AB SCN SERPL QL: NEGATIVE — SIGNIFICANT CHANGE UP
CRP SERPL-MCNC: 4.91 MG/DL — HIGH (ref 0–0.4)
CULTURE RESULTS: SIGNIFICANT CHANGE UP
CULTURE RESULTS: SIGNIFICANT CHANGE UP
D DIMER BLD IA.RAPID-MCNC: 292 NG/ML DDU — HIGH
ERYTHROCYTE [SEDIMENTATION RATE] IN BLOOD: 120 MM/HR — HIGH (ref 0–20)
FERRITIN SERPL-MCNC: 572 NG/ML — HIGH (ref 30–400)
GLUCOSE BLDC GLUCOMTR-MCNC: 165 MG/DL — HIGH (ref 70–99)
GLUCOSE BLDC GLUCOMTR-MCNC: 207 MG/DL — HIGH (ref 70–99)
GLUCOSE BLDC GLUCOMTR-MCNC: 210 MG/DL — HIGH (ref 70–99)
GLUCOSE BLDC GLUCOMTR-MCNC: 227 MG/DL — HIGH (ref 70–99)
LDH SERPL L TO P-CCNC: 410 U/L — HIGH (ref 50–242)
PROCALCITONIN SERPL-MCNC: 0.12 NG/ML — HIGH (ref 0.02–0.1)
RH IG SCN BLD-IMP: NEGATIVE — SIGNIFICANT CHANGE UP
SPECIMEN SOURCE: SIGNIFICANT CHANGE UP
SPECIMEN SOURCE: SIGNIFICANT CHANGE UP

## 2020-04-26 PROCEDURE — 99233 SBSQ HOSP IP/OBS HIGH 50: CPT

## 2020-04-26 PROCEDURE — 99232 SBSQ HOSP IP/OBS MODERATE 35: CPT

## 2020-04-26 RX ORDER — MINERAL OIL
133 OIL (ML) MISCELLANEOUS ONCE
Refills: 0 | Status: COMPLETED | OUTPATIENT
Start: 2020-04-26 | End: 2020-04-26

## 2020-04-26 RX ADMIN — FAMOTIDINE 40 MILLIGRAM(S): 10 INJECTION INTRAVENOUS at 17:26

## 2020-04-26 RX ADMIN — Medication 81 MILLIGRAM(S): at 12:35

## 2020-04-26 RX ADMIN — ALBUTEROL 2 PUFF(S): 90 AEROSOL, METERED ORAL at 22:30

## 2020-04-26 RX ADMIN — ENOXAPARIN SODIUM 40 MILLIGRAM(S): 100 INJECTION SUBCUTANEOUS at 04:37

## 2020-04-26 RX ADMIN — ALBUTEROL 2 PUFF(S): 90 AEROSOL, METERED ORAL at 12:34

## 2020-04-26 RX ADMIN — SENNA PLUS 2 TABLET(S): 8.6 TABLET ORAL at 22:29

## 2020-04-26 RX ADMIN — Medication 108 GRAM(S): at 04:39

## 2020-04-26 RX ADMIN — ENOXAPARIN SODIUM 40 MILLIGRAM(S): 100 INJECTION SUBCUTANEOUS at 17:26

## 2020-04-26 RX ADMIN — ZINC SULFATE TAB 220 MG (50 MG ZINC EQUIVALENT) 220 MILLIGRAM(S): 220 (50 ZN) TAB at 12:36

## 2020-04-26 RX ADMIN — INSULIN GLARGINE 6 UNIT(S): 100 INJECTION, SOLUTION SUBCUTANEOUS at 09:11

## 2020-04-26 RX ADMIN — Medication 2: at 12:33

## 2020-04-26 RX ADMIN — Medication 108 GRAM(S): at 12:34

## 2020-04-26 RX ADMIN — ALBUTEROL 2 PUFF(S): 90 AEROSOL, METERED ORAL at 17:25

## 2020-04-26 RX ADMIN — Medication 108 GRAM(S): at 20:30

## 2020-04-26 RX ADMIN — Medication 2: at 17:25

## 2020-04-26 RX ADMIN — FAMOTIDINE 40 MILLIGRAM(S): 10 INJECTION INTRAVENOUS at 04:38

## 2020-04-26 RX ADMIN — Medication 500 MILLIGRAM(S): at 12:35

## 2020-04-26 RX ADMIN — ALBUTEROL 2 PUFF(S): 90 AEROSOL, METERED ORAL at 04:37

## 2020-04-26 RX ADMIN — Medication 2: at 09:11

## 2020-04-26 NOTE — PROGRESS NOTE ADULT - SUBJECTIVE AND OBJECTIVE BOX
Leanne Ace MD  Pager  [facility] [phone]  [phone] (nights and weekends)    SUBJECTIVE:  Due to covid pandemic: pt was called with  phone, pt does not answer.  pt seen and examined. hx also per chart and per nurse  per pulm note, pt reported feeling slightly better today  he remains on NRB   has not had BM as of yet      MEDICATIONS  (STANDING):  ALBUTerol    90 MICROgram(s) HFA Inhaler 2 Puff(s) Inhalation every 6 hours  ampicillin  IVPB 1 Gram(s) IV Intermittent every 8 hours  ascorbic acid 500 milliGRAM(s) Oral daily  aspirin  chewable 81 milliGRAM(s) Oral daily  dextrose 5%. 1000 milliLiter(s) (50 mL/Hr) IV Continuous <Continuous>  dextrose 50% Injectable 12.5 Gram(s) IV Push once  dextrose 50% Injectable 25 Gram(s) IV Push once  dextrose 50% Injectable 25 Gram(s) IV Push once  enoxaparin Injectable 40 milliGRAM(s) SubCutaneous two times a day  famotidine    Tablet 40 milliGRAM(s) Oral two times a day  insulin glargine Injectable (LANTUS) 6 Unit(s) SubCutaneous every morning  insulin lispro (HumaLOG) corrective regimen sliding scale   SubCutaneous three times a day before meals  senna 2 Tablet(s) Oral at bedtime  zinc sulfate 220 milliGRAM(s) Oral daily    MEDICATIONS  (PRN):  acetaminophen   Tablet .. 650 milliGRAM(s) Oral every 4 hours PRN Temp greater or equal to 38.5C (101.3F)  benzonatate 100 milliGRAM(s) Oral three times a day PRN Cough  dextrose 40% Gel 15 Gram(s) Oral once PRN Blood Glucose LESS THAN 70 milliGRAM(s)/deciliter  glucagon  Injectable 1 milliGRAM(s) IntraMuscular once PRN Glucose LESS THAN 70 milligrams/deciliter  nystatin Powder 1 Application(s) Topical three times a day PRN rash MAD  sodium chloride 0.65% Nasal 1 Spray(s) Both Nostrils once PRN Nasal Congestion      Vital Signs Last 24 Hrs  T(C): 36.9 (26 Apr 2020 10:07), Max: 37.1 (25 Apr 2020 22:46)  T(F): 98.4 (26 Apr 2020 10:07), Max: 98.7 (25 Apr 2020 22:46)  HR: 101 (26 Apr 2020 10:48) (87 - 101)  BP: 124/71 (26 Apr 2020 10:48) (97/60 - 126/74)  BP(mean): --  RR: 30 (26 Apr 2020 10:07) (20 - 30)  SpO2: 92% (26 Apr 2020 10:48) (90% - 95%)    CAPILLARY BLOOD GLUCOSE      POCT Blood Glucose.: 227 mg/dL (26 Apr 2020 12:21)  POCT Blood Glucose.: 210 mg/dL (26 Apr 2020 08:59)  POCT Blood Glucose.: 180 mg/dL (25 Apr 2020 16:54)    I&O's Summary    25 Apr 2020 07:01  -  26 Apr 2020 07:00  --------------------------------------------------------  IN: 1288 mL / OUT: 200 mL / NET: 1088 mL    26 Apr 2020 07:01  -  26 Apr 2020 13:59  --------------------------------------------------------  IN: 360 mL / OUT: 0 mL / NET: 360 mL        PHYSICAL EXAM:  GENERAL: Looks stated age, elderly appearing male on NRB  CARDIOVASCULAR: Normal S1, S2  PULMONARY: Lungs clear to auscultation bilaterally. No wheezes/rales/rhonchi  GI: Abdomen non-distended, soft, Nontender.  Bowel sounds present  MSK/Ext:  No leg edema.  No calf tenderness bilaterally        LABS:                        12.8   4.90  )-----------( 232      ( 25 Apr 2020 07:53 )             42.0     04-25    143  |  103  |  38<H>  ----------------------------<  146<H>  4.5   |  27  |  1.22    Ca    8.7      25 Apr 2020 07:53    TPro  7.0  /  Alb  3.1<L>  /  TBili  0.5  /  DBili  x   /  AST  92<H>  /  ALT  45  /  AlkPhos  63  04-25                RADIOLOGY & ADDITIONAL TESTS:

## 2020-04-26 NOTE — PROGRESS NOTE ADULT - ASSESSMENT
84 M Serbian speaking, DM2 and hyperlipidemia, known COVID-19 positive with recent ED visit, brought by EMS from home for blood culture growing gram positive rods consistent with 2 different species of corneybacterium  in 2/4 bottles.   His sons also note altered mental status over last few days PTA, AMS improved inpt, pt is awake and alert. CT head without acute findings 4/23   c/b worsening hypoxia needing NRB since 4/23 s/p toco 4/24, s/p plasma trial  4/25, also on abx for possible UTI vs bacteruria,  repeat Blood cx with NGTD    Regarding COVID, pt has now finished plaqunil course which was started outpt and now   4/22-- pt continues to have fever and 2L NC  4/23- 02 requiremetns increasing. Now on NRB, pt taking off mask at times, needs to be placed in restraints. Plasma trial emailed for eligibility   4/24- continues on NRB- to be given [place] today   4/25- clincial status unchanged, continues on NRB, plasma trial       #COVID -19 infection, #Viral Pneumonia 2/2 Novel Coronavirus Infection (COVID-19):  pt completed Plaquenil course from outpt to inpt  4/22  now on NC--->NRB since 4/23  dose of tocizulmab 4/24  --Closely monitor respiratory status and escalate O2 therapy as necessary to maintain SpO2 92-96%; per institution policy, will escalate NC -> currently 100% NRB -> intubation (avoiding BiPAP/CPAP/High Flow given risk of aerosolizing virus). use continuous oximetry monitoring if/when on NRB.  -consider prone positioning if the pt is able to tolerate this to help with oxygenation    --c/w daily lovenox given increased hypercoagulable state in COVID-19 patients (BMI > 30 and CrCl > 15ml/min: Lovenox 40mg SQ BID)- monitor platelets   -monitor inflammatory markers [id number] to monitor disease progression (procalcitonin, CRP, ESR, ferritin, coags, D-dimer, LDH)   -Monitor daily (or as needed): CBC, CMP, Mg, Phos, CPK  --Limit IVF given risk of ARDS  greatly appreciate pulmonology reccs       #bacteremia  #GNR Bacteremia in 2/4 cultures from 4/19- consistent with two different cornebacterium  - patient completed course of azithromycin prior to positive culture result  - followup repeat cultures (sent on 4/21)-- thus far no growth to date   -UCX concerning for enterocouccus, though less than 99,000 started on ampilician for 5 day course (4/24-4/30)      #encephalopathy/Altered mental status at admission   - likely in setting of infection  - frequent orientation  -  CTH without acute changes, d/w neuroogy regarding this. could be hospital delirium vs encephlapathy seen with covid   -will need to d/w neurology if any further workup is needed for the encephalpathy   -Impression:  Head CT without contrast  1.  No acute intracranial hemorrhage, extra-axial collection, hydrocephalus, midline shift or space-occupying mass lesion.  2.  No convincing loss of gray-white junction or wedge-shaped infarct. 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        #type 2DM  - hold home meds for now  -A1C 8.4- on two oral agents, A1C goal for 81 year old is close to 8, can followup outpt for this with his PCP   -goal FSBG 100-180   FSBG premeals   -low dose ISS for now  -lantus 6units daily   if consistent PO intake ( pt does not eat well by himself during my visits), and if FSBG >250 X2 add 2 units lispro before meals   -diabetic diet         #HTN  monitor goal <130/80 in DM   not on home meds for HTN  monitor BP for now. if remains elevated can start low dose norvasc as needed       #constipation  pt with ongoing constipation despite Bowel regimen   bowel regimen for the pt daily  lactulose suppository today       #DVT Prophylaxis  --Given increased hypercoagulable state in COVID-19 patients, c/w Lovenox 40 BID : BMI > 30 and CrCl > 15ml/min:  --Will consider need for extended prophylaxis prior to discharge based on D-Dimer and calculated VTE risk.          #DC planning/care coordination  - FULL CODE per discussion with sons  4/22 son Jewel updated about fathers status and clinical course, son expressed mom also sick with COVID and d/w son that he should talk to PCP about treatment and monitoring at home as pt and steps to take to prevent hospitlization for mom if possible   4/23- son dlae updated about pts clinical course and increasing 02 requirement with NRB  4/24- [phone] Dale, holden updated about clinical course and need for Il-6 and that pt remains on  NRB since yesterday. explained to the son if the pt continues to have hypoxia there is a possibility of intubation   4/25- Dale updated about pts clinical status, ?possible plasma candidate, son consented for the trial, plasma given 4/25  -Eventual plan for discharge home with proper post-hospitalization instructions, including self-isolation at home, close monitoring of symptoms, etc.   -PT eval/case management       plan of care d/w 7 carloz Mejía      Diagnosis Codes:   J96.01 Acute respiratory failure with hypoxia;   U07.1 2019 Novel Coronavirus (COVID-19);   J12.89 Other viral pneumonia  CPT Code: Moderate Complexity 97951 84 M Vietnamese speaking, DM2 and hyperlipidemia, known COVID-19 positive with recent ED visit, brought by EMS from home for blood culture growing gram positive rods consistent with 2 different species of corneybacterium  in 2/4 bottles.   His sons also note altered mental status over last few days PTA, AMS improved inpt, pt is awake and alert. CT head without acute findings 4/23   c/b worsening hypoxia needing NRB since 4/23 s/p toco 4/24, s/p plasma trial  4/25, also on abx for possible UTI vs bacteruria,  repeat Blood cx with NGTD    Regarding COVID, pt has now finished plaqunil course which was started outpt and now   4/22-- pt continues to have fever and 2L NC  4/23- 02 requiremetns increasing. Now on NRB, pt taking off mask at times, needs to be placed in restraints. Plasma trial emailed for eligibility   4/24- continues on NRB- to be given [place] today   4/25- clincial status unchanged, continues on NRB, plasma trial       #COVID -19 infection, #Viral Pneumonia 2/2 Novel Coronavirus Infection (COVID-19):  pt completed Plaquenil course from outpt to inpt  4/22  now on NC--->NRB since 4/23  dose of tocizulmab 4/24  --Closely monitor respiratory status and escalate O2 therapy as necessary to maintain SpO2 92-96%; per institution policy, will escalate NC -> currently 100% NRB -> intubation (avoiding BiPAP/CPAP/High Flow given risk of aerosolizing virus). use continuous oximetry monitoring if/when on NRB.  -consider prone positioning if the pt is able to tolerate this to help with oxygenation    --c/w daily lovenox given increased hypercoagulable state in COVID-19 patients (BMI > 30 and CrCl > 15ml/min: Lovenox 40mg SQ BID)- monitor platelets   -monitor inflammatory markers [id number] to monitor disease progression (procalcitonin, CRP, ESR, ferritin, coags, D-dimer, LDH)   -Monitor daily (or as needed): CBC, CMP, Mg, Phos, CPK  --Limit IVF given risk of ARDS  greatly appreciate pulmonology reccs       #bacteremia  #GNR Bacteremia in 2/4 cultures from 4/19- consistent with two different cornebacterium  - patient completed course of azithromycin prior to positive culture result  - followup repeat cultures (sent on 4/21)-- thus far no growth to date   -UCX concerning for enterocouccus, though less than 99,000 started on ampilician for 5 day course (4/24-4/30)      #encephalopathy/Altered mental status at admission   - likely in setting of infection  - frequent orientation  -  CTH without acute changes, d/w neuroogy regarding this. could be hospital delirium vs encephlapathy seen with covid   -will need to d/w neurology if any further workup is needed for the encephalpathy   -Impression:  Head CT without contrast  1.  No acute intracranial hemorrhage, extra-axial collection, hydrocephalus, midline shift or space-occupying mass lesion.  2.  No convincing loss of gray-white junction or wedge-shaped infarct. 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        #type 2DM  - hold home meds for now  -A1C 8.4- on two oral agents, A1C goal for 81 year old is close to 8, can followup outpt for this with his PCP   -goal FSBG 100-180   FSBG premeals   -low dose ISS for now  -lantus 6units daily   if consistent PO intake ( pt does not eat well by himself during my visits), and if FSBG >250 X2 add 2 units lispro before meals   -diabetic diet         #HTN  monitor goal <130/80 in DM   not on home meds for HTN  monitor BP for now. if remains elevated can start low dose norvasc as needed       #constipation  pt with ongoing constipation despite Bowel regimen   bowel regimen for the pt daily  lactulose if no BM today--->suppository today       #DVT Prophylaxis  --Given increased hypercoagulable state in COVID-19 patients, c/w Lovenox 40 BID : BMI > 30 and CrCl > 15ml/min:  --Will consider need for extended prophylaxis prior to discharge based on D-Dimer and calculated VTE risk.          #DC planning/care coordination  - FULL CODE per discussion with sons  4/22 son Jewel updated about fathers status and clinical course, son expressed mom also sick with COVID and d/w son that he should talk to PCP about treatment and monitoring at home as pt and steps to take to prevent hospitlization for mom if possible   4/24- [phone] holden Hunter updated about clinical course and need for Il-6 and that pt remains on  NRB since yesterday. explained to the son if the pt continues to have hypoxia there is a possibility of intubation   4/25- Dale updated about pts clinical status, ?possible plasma candidate, son consented for the trial, plasma given 4/25 4.26- son Dale updated about clinical status, son explainedhe also has covid and PNA and asked how he can get plasma. explained to Dale at this time i do not believe plasma is being given outside the clinical trials, however bc he is not my pt I would not be able to advise him as to his medical care and it is best to call the PCP. Dale again asking what steps he can take after admission to get plasma, redirected Dale that I would urge him to call PCP regarding his care and if at any point he is feels its is an emergency and feeling unstable he should go to the ER.  -Eventual plan for discharge home with proper post-hospitalization instructions, including self-isolation at home, close monitoring of symptoms, etc.   -PT eval/case management       plan of care d/w 7 carloz Mejía      Diagnosis Codes:   J96.01 Acute respiratory failure with hypoxia;   U07.1 2019 Novel Coronavirus (COVID-19);   J12.89 Other viral pneumonia  CPT Code: Moderate Complexity 93072

## 2020-04-26 NOTE — PROGRESS NOTE ADULT - ATTENDING COMMENTS
as above-slow improvements  multifactorial resp failure-covid19 pneumonitis, obesity/debility--O2 taper as able-keep sat above 90%  covid19 pneumonitis--s/p plaquenil, s/p 1/3 doses of tocilizumab 800, vit d/c, zn, melatonin (hope to get another dose of toci)  corinybacterial infection-ampicillen continues  DVT prophylaxis                   GI-pepcid 40 bid               DM-BS control      OOB as able  Donovan Gurrola MD-Pulmonary   [phone]

## 2020-04-26 NOTE — PROVIDER CONTACT NOTE (OTHER) - ASSESSMENT
RN at bedside when CPOX reading ~75% while on 15L nonrebreather mask. Pt took mask off face. Whe mask placed back on face to allow for O2 sat to improve pt took mask off & waving hands in the air. RN emphasized need for mask to but pt cont to refuse. RN at bedside when CPOX reading ~75% while on 15L nonrebreather mask. Pt took mask off face. When mask placed back on face to allow for O2 sat to improve pt took mask off & waving hands in the air. RN emphasized need for mask to but pt cont to refuse.

## 2020-04-26 NOTE — PROGRESS NOTE ADULT - SUBJECTIVE AND OBJECTIVE BOX
CHIEF COMPLAINT: f/up sob, resp failure, obesity, covid19 pneumonitis--slightly better-less cough and sob    Interval Events: s/p toci 4/24    REVIEW OF SYSTEMS:  Constitutional: No fevers or chills. No weight loss. + fatigue or generalized malaise.  Eyes: No itching or discharge from the eyes  ENT: No ear pain. No ear discharge. No nasal congestion. No post nasal drip. No epistaxis. No throat pain. No sore throat. No difficulty swallowing.   CV: No chest pain. No palpitations. No lightheadedness or dizziness.   Resp: No dyspnea at rest. + dyspnea on exertion. No orthopnea. No wheezing. No cough. No stridor. No sputum production. No chest pain with respiration.  GI: No nausea. No vomiting. No diarrhea.  MSK: No joint pain or pain in any extremities  Integumentary: No skin lesions. No pedal edema.  Neurological: +gross motor weakness. No sensory changes.  [+ ] All other systems negative  [ ] Unable to assess ROS because ________    OBJECTIVE:  ICU Vital Signs Last 24 Hrs  T(C): 36.9 (26 Apr 2020 04:23), Max: 37.1 (25 Apr 2020 22:46)  T(F): 98.5 (26 Apr 2020 04:23), Max: 98.7 (25 Apr 2020 22:46)  HR: 91 (26 Apr 2020 04:23) (87 - 92)  BP: 123/74 (26 Apr 2020 04:23) (97/60 - 123/74)  BP(mean): --  ABP: --  ABP(mean): --  RR: 20 (26 Apr 2020 04:23) (20 - 22)  SpO2: 93% (26 Apr 2020 04:23) (90% - 95%)        04-24 @ 07:01  -  04-25 @ 07:00  --------------------------------------------------------  IN: 710 mL / OUT: 650 mL / NET: 60 mL    04-25 @ 07:01  -  04-26 @ 06:40  --------------------------------------------------------  IN: 690 mL / OUT: 0 mL / NET: 690 mL      CAPILLARY BLOOD GLUCOSE      POCT Blood Glucose.: 180 mg/dL (25 Apr 2020 16:54)      PHYSICAL EXAM: NAD in bed on NRB  General: Awake, alert, oriented X 3.   HEENT: Atraumatic, normocephalic.                 Mallampatti Grade 3                No nasal congestion.                No tonsillar or pharyngeal exudates.  Lymph Nodes: No palpable lymphadenopathy  Neck: No JVD. No carotid bruit.   Respiratory: Normal chest expansion                         Normal percussion                         Normal and equal air entry                         No wheeze, rhonchi but bibasilar rales.  Cardiovascular: S1 S2 normal. No murmurs, rubs or gallops.   Abdomen: Soft, non-tender, non-distended. No organomegaly. Normoactive bowel sounds.  Extremities: Warm to touch. Peripheral pulse palpable. No pedal edema.   Skin: No rashes or skin lesions  Neurological: Motor and sensory examination equal and normal in all four extremities.  Psychiatry: Appropriate mood and affect.    HOSPITAL MEDICATIONS:  MEDICATIONS  (STANDING):  ALBUTerol    90 MICROgram(s) HFA Inhaler 2 Puff(s) Inhalation every 6 hours  ampicillin  IVPB 1 Gram(s) IV Intermittent every 8 hours  ascorbic acid 500 milliGRAM(s) Oral daily  aspirin  chewable 81 milliGRAM(s) Oral daily  dextrose 5%. 1000 milliLiter(s) (50 mL/Hr) IV Continuous <Continuous>  dextrose 50% Injectable 12.5 Gram(s) IV Push once  dextrose 50% Injectable 25 Gram(s) IV Push once  dextrose 50% Injectable 25 Gram(s) IV Push once  enoxaparin Injectable 40 milliGRAM(s) SubCutaneous two times a day  famotidine    Tablet 40 milliGRAM(s) Oral two times a day  insulin glargine Injectable (LANTUS) 6 Unit(s) SubCutaneous every morning  insulin lispro (HumaLOG) corrective regimen sliding scale   SubCutaneous three times a day before meals  senna 2 Tablet(s) Oral at bedtime  zinc sulfate 220 milliGRAM(s) Oral daily    MEDICATIONS  (PRN):  acetaminophen   Tablet .. 650 milliGRAM(s) Oral every 4 hours PRN Temp greater or equal to 38.5C (101.3F)  benzonatate 100 milliGRAM(s) Oral three times a day PRN Cough  dextrose 40% Gel 15 Gram(s) Oral once PRN Blood Glucose LESS THAN 70 milliGRAM(s)/deciliter  glucagon  Injectable 1 milliGRAM(s) IntraMuscular once PRN Glucose LESS THAN 70 milligrams/deciliter  nystatin Powder 1 Application(s) Topical three times a day PRN rash MAD  sodium chloride 0.65% Nasal 1 Spray(s) Both Nostrils once PRN Nasal Congestion      LABS:                        12.8   4.90  )-----------( 232      ( 25 Apr 2020 07:53 )             42.0     04-25    143  |  103  |  38<H>  ----------------------------<  146<H>  4.5   |  27  |  1.22    Ca    8.7      25 Apr 2020 07:53    TPro  7.0  /  Alb  3.1<L>  /  TBili  0.5  /  DBili  x   /  AST  92<H>  /  ALT  45  /  AlkPhos  63  04-25              MICROBIOLOGY:     RADIOLOGY:  [ ] Reviewed and interpreted by me    Point of Care Ultrasound Findings:    PFT:    EKG:

## 2020-04-26 NOTE — PROGRESS NOTE ADULT - ASSESSMENT
84 year old man with DM2 and hyperlipidemia, known COVID-19 positive with recent ED visit, brought by EMS from home for blood culture growing gram positive rods in 1/4 bottles, consistent with corynebacterium    s/p plaquenil, tocolizumab x 1  ******************  4/25-arrousable--no signif complaints  4/26-slightly better as per pt

## 2020-04-27 LAB
ALBUMIN SERPL ELPH-MCNC: 3.1 G/DL — LOW (ref 3.3–5)
ALP SERPL-CCNC: 77 U/L — SIGNIFICANT CHANGE UP (ref 40–120)
ALT FLD-CCNC: 60 U/L — HIGH (ref 10–45)
ANION GAP SERPL CALC-SCNC: 12 MMOL/L — SIGNIFICANT CHANGE UP (ref 5–17)
AST SERPL-CCNC: 96 U/L — HIGH (ref 10–40)
BILIRUB SERPL-MCNC: 0.5 MG/DL — SIGNIFICANT CHANGE UP (ref 0.2–1.2)
BUN SERPL-MCNC: 41 MG/DL — HIGH (ref 7–23)
CALCIUM SERPL-MCNC: 8.7 MG/DL — SIGNIFICANT CHANGE UP (ref 8.4–10.5)
CHLORIDE SERPL-SCNC: 100 MMOL/L — SIGNIFICANT CHANGE UP (ref 96–108)
CO2 SERPL-SCNC: 27 MMOL/L — SIGNIFICANT CHANGE UP (ref 22–31)
CREAT SERPL-MCNC: 1.16 MG/DL — SIGNIFICANT CHANGE UP (ref 0.5–1.3)
CULTURE RESULTS: SIGNIFICANT CHANGE UP
CULTURE RESULTS: SIGNIFICANT CHANGE UP
GLUCOSE BLDC GLUCOMTR-MCNC: 211 MG/DL — HIGH (ref 70–99)
GLUCOSE BLDC GLUCOMTR-MCNC: 215 MG/DL — HIGH (ref 70–99)
GLUCOSE BLDC GLUCOMTR-MCNC: 216 MG/DL — HIGH (ref 70–99)
GLUCOSE BLDC GLUCOMTR-MCNC: 248 MG/DL — HIGH (ref 70–99)
GLUCOSE SERPL-MCNC: 206 MG/DL — HIGH (ref 70–99)
HCT VFR BLD CALC: 40.7 % — SIGNIFICANT CHANGE UP (ref 39–50)
HGB BLD-MCNC: 12.4 G/DL — LOW (ref 13–17)
MCHC RBC-ENTMCNC: 27 PG — SIGNIFICANT CHANGE UP (ref 27–34)
MCHC RBC-ENTMCNC: 30.5 GM/DL — LOW (ref 32–36)
MCV RBC AUTO: 88.7 FL — SIGNIFICANT CHANGE UP (ref 80–100)
NRBC # BLD: 0 /100 WBCS — SIGNIFICANT CHANGE UP (ref 0–0)
PLATELET # BLD AUTO: 325 K/UL — SIGNIFICANT CHANGE UP (ref 150–400)
POTASSIUM SERPL-MCNC: 4.9 MMOL/L — SIGNIFICANT CHANGE UP (ref 3.5–5.3)
POTASSIUM SERPL-SCNC: 4.9 MMOL/L — SIGNIFICANT CHANGE UP (ref 3.5–5.3)
PROT SERPL-MCNC: 7 G/DL — SIGNIFICANT CHANGE UP (ref 6–8.3)
RBC # BLD: 4.59 M/UL — SIGNIFICANT CHANGE UP (ref 4.2–5.8)
RBC # FLD: 13.9 % — SIGNIFICANT CHANGE UP (ref 10.3–14.5)
SODIUM SERPL-SCNC: 139 MMOL/L — SIGNIFICANT CHANGE UP (ref 135–145)
SPECIMEN SOURCE: SIGNIFICANT CHANGE UP
SPECIMEN SOURCE: SIGNIFICANT CHANGE UP
WBC # BLD: 6.59 K/UL — SIGNIFICANT CHANGE UP (ref 3.8–10.5)
WBC # FLD AUTO: 6.59 K/UL — SIGNIFICANT CHANGE UP (ref 3.8–10.5)

## 2020-04-27 PROCEDURE — 99232 SBSQ HOSP IP/OBS MODERATE 35: CPT

## 2020-04-27 PROCEDURE — 99233 SBSQ HOSP IP/OBS HIGH 50: CPT | Mod: GC

## 2020-04-27 RX ORDER — POLYETHYLENE GLYCOL 3350 17 G/17G
17 POWDER, FOR SOLUTION ORAL
Refills: 0 | Status: DISCONTINUED | OUTPATIENT
Start: 2020-04-27 | End: 2020-04-27

## 2020-04-27 RX ORDER — SODIUM CHLORIDE 9 MG/ML
1000 INJECTION, SOLUTION INTRAVENOUS
Refills: 0 | Status: DISCONTINUED | OUTPATIENT
Start: 2020-04-27 | End: 2020-04-27

## 2020-04-27 RX ORDER — SODIUM CHLORIDE 9 MG/ML
1000 INJECTION, SOLUTION INTRAVENOUS
Refills: 0 | Status: DISCONTINUED | OUTPATIENT
Start: 2020-04-27 | End: 2020-04-28

## 2020-04-27 RX ORDER — FOLIC ACID 0.8 MG
1 TABLET ORAL DAILY
Refills: 0 | Status: DISCONTINUED | OUTPATIENT
Start: 2020-04-27 | End: 2020-04-30

## 2020-04-27 RX ADMIN — ALBUTEROL 2 PUFF(S): 90 AEROSOL, METERED ORAL at 23:02

## 2020-04-27 RX ADMIN — NYSTATIN CREAM 1 APPLICATION(S): 100000 CREAM TOPICAL at 02:27

## 2020-04-27 RX ADMIN — FAMOTIDINE 40 MILLIGRAM(S): 10 INJECTION INTRAVENOUS at 05:46

## 2020-04-27 RX ADMIN — Medication 108 GRAM(S): at 11:00

## 2020-04-27 RX ADMIN — ALBUTEROL 2 PUFF(S): 90 AEROSOL, METERED ORAL at 05:47

## 2020-04-27 RX ADMIN — Medication 1 MILLIGRAM(S): at 11:00

## 2020-04-27 RX ADMIN — Medication 108 GRAM(S): at 20:32

## 2020-04-27 RX ADMIN — ALBUTEROL 2 PUFF(S): 90 AEROSOL, METERED ORAL at 17:05

## 2020-04-27 RX ADMIN — Medication 125 MILLIGRAM(S): at 23:01

## 2020-04-27 RX ADMIN — Medication 2: at 18:00

## 2020-04-27 RX ADMIN — SODIUM CHLORIDE 100 MILLILITER(S): 9 INJECTION, SOLUTION INTRAVENOUS at 05:46

## 2020-04-27 RX ADMIN — Medication 81 MILLIGRAM(S): at 11:00

## 2020-04-27 RX ADMIN — ENOXAPARIN SODIUM 40 MILLIGRAM(S): 100 INJECTION SUBCUTANEOUS at 16:20

## 2020-04-27 RX ADMIN — NYSTATIN CREAM 1 APPLICATION(S): 100000 CREAM TOPICAL at 18:01

## 2020-04-27 RX ADMIN — ALBUTEROL 2 PUFF(S): 90 AEROSOL, METERED ORAL at 11:01

## 2020-04-27 RX ADMIN — FAMOTIDINE 40 MILLIGRAM(S): 10 INJECTION INTRAVENOUS at 16:20

## 2020-04-27 RX ADMIN — INSULIN GLARGINE 6 UNIT(S): 100 INJECTION, SOLUTION SUBCUTANEOUS at 10:53

## 2020-04-27 RX ADMIN — ENOXAPARIN SODIUM 40 MILLIGRAM(S): 100 INJECTION SUBCUTANEOUS at 05:46

## 2020-04-27 RX ADMIN — ZINC SULFATE TAB 220 MG (50 MG ZINC EQUIVALENT) 220 MILLIGRAM(S): 220 (50 ZN) TAB at 11:00

## 2020-04-27 RX ADMIN — Medication 500 MILLIGRAM(S): at 11:01

## 2020-04-27 RX ADMIN — Medication 2: at 10:48

## 2020-04-27 RX ADMIN — Medication 108 GRAM(S): at 05:49

## 2020-04-27 NOTE — PROGRESS NOTE ADULT - ASSESSMENT
84M [place] speaking, DM2, HLD, known COVID-19 positive with recent ED visit, brought by EMS from home for blood culture growing gram positive rods consistent with 2 different species of cornyebacterium in 2/4 bottles and AMS. AMS improving since admission. Course c/b hypoxia requiring NRB since 4/23, s/p Plaquenil (initiated as outpatient) s/p toco 4/24, s/p plasma trial 4/25, on abx for possible UTI vs bacteruria, repeat blood cx with NGTD.    #COVID-19 infection, Viral Pneumonia 2/2 Novel Coronavirus Infection (COVID-19): S/p Plaquenil (completed 4/22), s/p dose of tocizulmab 4/24    --On NRB since 4/23. Closely monitor respiratory status and escalate O2 therapy as necessary to maintain SpO2 92-96%; per institution policy, will escalate NC -> currently 100% NRB -> intubation (avoiding BiPAP/CPAP/High Flow given risk of aerosolizing virus). Use continuous oximetry monitoring if/when on NRB.  --Consider prone positioning if the pt is able to tolerate this to help with oxygenation   --c/w daily lovenox given increased hypercoagulable state in COVID-19 patients (BMI > 30 and CrCl > 15ml/min: Lovenox 40mg SQ BID)  --Monitor daily (or as needed): CBC, CMP, Mg, Phos, CPK. Monitor inflammatory markers [id number] to monitor disease progression (procalcitonin, CRP, ESR, ferritin, coags, D-dimer, LDH)   --Limit IVF given risk of ARDS  --Appreciate pulmonology input    #Bacteremia: GNR bacteremia in 2/4 cultures from 4/19 consistent with two different corynebacterium. S/p course of azithromycin prior to positive culture result  --follow up repeat cultures (sent on 4/21)-- thus far no growth to date   --UCX concerning for enterococcus though less than 99,000, started on ampicillin for 5 day course (4/24-4/30)    #Encephalopathy/Altered mental status at admission: Likely 2/2 infection. CT head without acute changes  --Frequent orientation  --Per prior discussion with neurology, could be hospital delirium vs encephalopathy seen with COVID    #Type 2DM: A1c 8.4  --Continue to hold home meds for now  --Goal FSBG 100-180   --low dose ISS for now  --Lantus 6U daily   --If consistent PO intake and if FSBG >250 x2 add 2 units lispro before meals   --Diabetic diet     #HTN: Not on home meds for HTN  --Continue to monitor, goal <130/80 in DM   --If remains elevated, can start low dose norvasc as needed     #Constipation  --Adjust bowel regimen as needed    #DVT Prophylaxis  --Given increased hypercoagulable state in COVID-19 patients, c/w Lovenox 40 BID: BMI > 30 and CrCl > 15ml/min:  --Will consider need for extended prophylaxis prior to discharge based on D-Dimer and calculated VTE risk.    #DC planning/care coordination  --FULL CODE per prior discussions with patient's sons  --Contact: Son[facility]ale [phone]  --Eventual plan for discharge home with proper post-hospitalization instructions, including self-isolation at home, close monitoring of symptoms, etc.   --PT eval/case management     Above care plan discussed with: __________    Camryn Maher MD  Attending, Division of Gastroenterology 84M Singaporean speaking, DM2, HLD, known COVID-19 positive with recent ED visit, brought by EMS from home for blood culture growing gram positive rods consistent with 2 different species of cornyebacterium in 2/4 bottles and AMS. AMS improving since admission. Course c/b hypoxia requiring NRB since 4/23, s/p Plaquenil (initiated as outpatient) s/p toco 4/24, s/p plasma trial 4/25, on abx for possible UTI vs bacteruria, repeat blood cx with NGTD.    #COVID-19 infection, Viral Pneumonia 2/2 Novel Coronavirus Infection (COVID-19): S/p Plaquenil (completed 4/22), s/p dose of tocizulmab 4/24  --On NRB since 4/23. Closely monitor respiratory status and escalate O2 therapy as necessary to maintain SpO2 92-96%; per institution policy, will escalate NC -> currently 100% NRB -> intubation (avoiding BiPAP/CPAP/High Flow given risk of aerosolizing virus). Use continuous oximetry monitoring if/when on NRB.  --Consider prone positioning; however, suspect that patient will be unlikely to tolerate this position, especially while in restraints  --c/w daily lovenox given increased hypercoagulable state in COVID-19 patients (BMI > 30 and CrCl > 15ml/min: Lovenox 40mg SQ BID)  --Monitor daily (or as needed): CBC, CMP, Mg, Phos, CPK. Monitor inflammatory markers [id number] to monitor disease progression (procalcitonin, CRP, ESR, ferritin, coags, D-dimer, LDH)   --Limit IVF given risk of ARDS  --Appreciate pulmonology input    #Bacteremia: GNR bacteremia in 2 of 4 cultures from 4/19 consistent with two different corynebacterium. S/p course of azithromycin prior to positive culture result  --UCX concerning for enterococcus though less than 99,000, started on ampicillin for 5 day course (4/24-4/30)  --Follow up repeat cultures (sent on 4/21, 4/22)-- thus far NGTD    #Encephalopathy/Altered mental status at admission: Likely 2/2 infection. CT head without acute changes  --Frequent orientation  --Restraints as needed to prevent removal of supplemental O2  --Per prior discussion with neurology, could be hospital delirium vs encephalopathy seen with COVID-19    #Type 2DM: A1c 8.4  --Continue to hold home meds for now  --Goal FSBG 100-180   --low dose ISS for now  --Lantus 6U daily   --If consistent PO intake and if FSBG >250 x2 add 2 units lispro before meals   --Diabetic diet     #HTN: Not on home meds for HTN  --Continue to monitor, goal <130/80 in DM   --If remains elevated, can start low dose Norvasc as needed     #Constipation: Patient unable to state clearly whether he has had BM and this AM nurse was unsure  --Currently only on senna QHS, but has received one time doses of lactulose, suppository, etc  --Will initiate Miralax BID and titrate as needed (hold if frequent or loose BM)    #Elevated liver tests: Suspect 2/2 COVID-19  --Monitor for now  --No indication for imaging at this time    #DVT Prophylaxis  --Given increased hypercoagulable state in COVID-19 patients, c/w Lovenox 40 BID: BMI > 30 and CrCl > 15ml/min:  --Will consider need for extended prophylaxis prior to discharge based on D-Dimer and calculated VTE risk.    #DC planning/care coordination  --FULL CODE per prior discussions with patient's sons  --Contact: Son[facility] [phone]  --Eventual plan for discharge home with proper post-hospitalization instructions, including self-isolation at home, close monitoring of symptoms, etc.   --PT eval/case management     Above care plan discussed with: __________    Camryn Maher MD  Attending, Division of Gastroenterology 84M Armenian speaking, DM2, HLD, known COVID-19 positive with recent ED visit, brought by EMS from home for blood culture growing gram positive rods consistent with 2 different species of cornyebacterium in 2/4 bottles and AMS. AMS improving since admission. Course c/b hypoxia requiring NRB since 4/23, s/p Plaquenil (initiated as outpatient) s/p toco 4/24, s/p plasma trial 4/25, on abx for possible UTI vs bacteruria, repeat blood cx with NGTD.    #COVID-19 infection, Viral Pneumonia 2/2 Novel Coronavirus Infection (COVID-19): S/p Plaquenil (completed 4/22), s/p dose of tocizulmab 4/24  --On NRB since 4/23. Closely monitor respiratory status and escalate O2 therapy as necessary to maintain SpO2 92-96%; per institution policy, will escalate NC -> currently 100% NRB -> intubation (avoiding BiPAP/CPAP/High Flow given risk of aerosolizing virus). Use continuous oximetry monitoring if/when on NRB.  --Consider prone positioning; however, suspect that patient will be unlikely to tolerate this position, especially while in restraints  --c/w daily lovenox given increased hypercoagulable state in COVID-19 patients (BMI > 30 and CrCl > 15ml/min: Lovenox 40mg SQ BID)  --Monitor daily (or as needed): CBC, CMP, Mg, Phos, CPK. Monitor inflammatory markers [id number] to monitor disease progression (procalcitonin, CRP, ESR, ferritin, coags, D-dimer, LDH)   --Limit IVF given risk of ARDS  --Appreciate pulmonology input    #Bacteremia: GNR bacteremia in 2 of 4 cultures from 4/19 consistent with two different corynebacterium. S/p course of azithromycin prior to positive culture result  --UCX concerning for enterococcus though less than 99,000, started on ampicillin for 5 day course (4/24-4/30)  --Follow up repeat cultures (sent on 4/21, 4/22)-- thus far NGTD    #Encephalopathy/Altered mental status at admission: Likely 2/2 infection. CT head without acute changes  --Frequent orientation  --Restraints as needed to prevent removal of supplemental O2  --Per prior discussion with neurology, could be hospital delirium vs encephalopathy seen with COVID-19    #Type 2DM: A1c 8.4  --Continue to hold home meds for now  --Goal FSBG 100-180   --low dose ISS for now  --Lantus 6U daily   --If consistent PO intake and if FSBG >250 x2 add 2 units lispro before meals   --Diabetic diet     #HTN: Not on home meds for HTN  --Continue to monitor, goal <130/80 in DM   --If remains elevated, can start low dose Norvasc as needed     #Constipation: Patient unable to state clearly whether he has had BM and this AM nurse was unsure  --Currently only on senna QHS, but has received one time doses of lactulose, suppository, etc  --Will initiate Miralax BID and titrate as needed (hold if frequent or loose BM)    #Elevated liver tests: Suspect 2/2 COVID-19  --Monitor for now  --No indication for imaging at this time    #DVT Prophylaxis  --Given increased hypercoagulable state in COVID-19 patients, c/w Lovenox 40 BID: BMI > 30 and CrCl > 15ml/min:  --Will consider need for extended prophylaxis prior to discharge based on D-Dimer and calculated VTE risk.    #DC planning/care coordination  --FULL CODE per prior discussions with patient's sons  --Contact: [facility] [phone]  --Eventual plan for discharge home with proper post-hospitalization instructions, including self-isolation at home, close monitoring of symptoms, etc.   --PT eval/case management     Above care plan discussed with: 7 Duane STERLING, Rayo Maher MD  Attending, Division of Gastroenterology 84M Turkmen speaking, DM2, HLD, known COVID-19 positive with recent ED visit, brought by EMS from home for blood culture growing gram positive rods consistent with 2 different species of cornyebacterium in 2/4 bottles and AMS. AMS improving since admission. Course c/b hypoxia requiring NRB since 4/23, s/p Plaquenil (initiated as outpatient) s/p toco 4/24, s/p plasma trial 4/25, on abx for possible UTI vs bacteruria, repeat blood cx with NGTD.    #COVID-19 infection, Viral Pneumonia 2/2 Novel Coronavirus Infection (COVID-19): S/p Plaquenil (completed 4/22), s/p dose of tocizulmab 4/24  --On NRB since 4/23. Closely monitor respiratory status and escalate O2 therapy as necessary to maintain SpO2 92-96%; per institution policy, will escalate NC -> currently 100% NRB -> intubation (avoiding BiPAP/CPAP/High Flow given risk of aerosolizing virus). Use continuous oximetry monitoring if/when on NRB.  --Consider prone positioning; however, suspect that patient will be unlikely to tolerate this position, especially while in restraints  --c/w daily lovenox given increased hypercoagulable state in COVID-19 patients (BMI > 30 and CrCl > 15ml/min: Lovenox 40mg SQ BID)  --Monitor daily (or as needed): CBC, CMP, Mg, Phos, CPK. Monitor inflammatory markers [id number] to monitor disease progression (procalcitonin, CRP, ESR, ferritin, coags, D-dimer, LDH)   --Limit IVF given risk of ARDS  --Appreciate pulmonology input    #Bacteremia: GNR bacteremia in 2 of 4 cultures from 4/19 consistent with two different corynebacterium. S/p course of azithromycin prior to positive culture result  --UCX concerning for enterococcus though less than 99,000, started on ampicillin for 5 day course (4/24-4/30)  --Follow up repeat cultures (sent on 4/21, 4/22)-- thus far NGTD    #Encephalopathy/Altered mental status at admission: Likely 2/2 infection. CT head without acute changes  --Frequent orientation  --Restraints as needed to prevent removal of supplemental O2  --Per prior discussion with neurology, could be hospital delirium vs encephalopathy seen with COVID-19    #Type 2DM: A1c 8.4  --Continue to hold home meds for now  --Goal FSBG 100-180   --low dose ISS for now  --Lantus 6U daily   --If consistent PO intake and if FSBG >250 x2 add 2 units lispro before meals   --Diabetic diet     #HTN: Not on home meds for HTN  --Continue to monitor, goal <130/80 in DM   --If remains elevated, can start low dose Norvasc as needed     #Constipation: Patient unable to state clearly whether he has had BM and this AM nurse was unsure  --Currently only on senna QHS, but has received one time doses of lactulose, suppository, etc  --Will adjust as needed    #Elevated liver tests: Suspect 2/2 COVID-19  --Monitor for now  --No indication for imaging at this time    #DVT Prophylaxis  --Given increased hypercoagulable state in COVID-19 patients, c/w Lovenox 40 BID: BMI > 30 and CrCl > 15ml/min:  --Will consider need for extended prophylaxis prior to discharge based on D-Dimer and calculated VTE risk.    #DC planning/care coordination  --FULL CODE per prior discussions with patient's sons  --Contact: [facility] [phone]  --Eventual plan for discharge home with proper post-hospitalization instructions, including self-isolation at home, close monitoring of symptoms, etc.   --PT eval/case management     Above care plan discussed with: 7 Duane STERLING, Rayo Maher MD  Attending, Division of Gastroenterology 84M [place] speaking, DM2, HLD, known COVID-19 positive with recent ED visit, brought by EMS from home for blood culture growing gram positive rods consistent with 2 different species of cornyebacterium in 2/4 bottles and AMS. AMS improving since admission. Course c/b hypoxia requiring NRB since 4/23, s/p Plaquenil (initiated as outpatient) s/p toco 4/24, s/p plasma trial 4/25, on abx for possible UTI vs bacteruria, repeat blood cx with NGTD.    #COVID-19 infection, Viral Pneumonia 2/2 Novel Coronavirus Infection (COVID-19): S/p Plaquenil (completed 4/22), s/p dose of tocizulmab 4/24  --On NRB since 4/23. Closely monitor respiratory status and escalate O2 therapy as necessary to maintain SpO2 92-96%; per institution policy, will escalate NC -> currently 100% NRB -> intubation (avoiding BiPAP/CPAP/High Flow given risk of aerosolizing virus). Use continuous oximetry monitoring if/when on NRB.  --Consider prone positioning; however, suspect that patient will be unlikely to tolerate this position, especially while in restraints  --c/w daily lovenox given increased hypercoagulable state in COVID-19 patients (BMI > 30 and CrCl > 15ml/min: Lovenox 40mg SQ BID)  --Monitor daily (or as needed): CBC, CMP, Mg, Phos, CPK. Monitor inflammatory markers [id number] to monitor disease progression (procalcitonin, CRP, ESR, ferritin, coags, D-dimer, LDH)   --Limit IVF given risk of ARDS  --Appreciate pulmonology input    #Bacteremia: GNR bacteremia in 2 of 4 cultures from 4/19 consistent with two different corynebacterium. S/p course of azithromycin prior to positive culture result  --UCX concerning for enterococcus though less than 99,000, started on ampicillin for 5 day course (4/24-4/30)  --Follow up repeat cultures (sent on 4/21, 4/22)-- thus far NGTD    #Encephalopathy/Altered mental status at admission: Likely 2/2 infection. CT head without acute changes  --Frequent orientation  --Restraints as needed to prevent removal of supplemental O2  --Per prior discussion with neurology, could be hospital delirium vs encephalopathy seen with COVID-19    #Type 2DM: A1c 8.4  --Continue to hold home meds for now  --Goal FSBG 100-180   --low dose ISS for now  --Lantus 6U daily   --If consistent PO intake and if FSBG >250 x2 add 2 units lispro before meals   --Diabetic diet     #HTN: Not on home meds for HTN  --Continue to monitor, goal <130/80 in DM   --If remains elevated, can start low dose Norvasc as needed     #Constipation: Patient unable to state clearly whether he has had BM and this AM nurse was unsure  --Currently only on senna QHS, but has received one time doses of lactulose, suppository, etc  --Will adjust as needed    #Elevated liver tests: Suspect 2/2 COVID-19  --Monitor for now  --No indication for imaging at this time    #DVT Prophylaxis  --Given increased hypercoagulable state in COVID-19 patients, c/w Lovenox 40 BID: BMI > 30 and CrCl > 15ml/min:  --Will consider need for extended prophylaxis prior to discharge based on D-Dimer and calculated VTE risk.    #DC planning/care coordination  --FULL CODE per prior discussions with patient's sons  --Contact: [facility] [phone] (updated daily, most recently 4/27)  --Eventual plan for discharge home with proper post-hospitalization instructions, including self-isolation at home, close monitoring of symptoms, etc.   --PT eval/case management     Above care plan discussed with: 7 Duane STERLING, Rayo Maher MD  Attending, Division of Gastroenterology

## 2020-04-27 NOTE — PROGRESS NOTE ADULT - SUBJECTIVE AND OBJECTIVE BOX
CHIEF COMPLAINT:    Interval Events:    REVIEW OF SYSTEMS:  [ ] All other systems negative  [ ] Unable to assess ROS because ________    OBJECTIVE:  ICU Vital Signs Last 24 Hrs  T(C): 36.9 (27 Apr 2020 09:40), Max: 36.9 (27 Apr 2020 04:44)  T(F): 98.4 (27 Apr 2020 09:40), Max: 98.4 (27 Apr 2020 04:44)  HR: 108 (27 Apr 2020 09:40) (88 - 108)  BP: 132/75 (27 Apr 2020 09:40) (131/73 - 132/75)  BP(mean): --  ABP: --  ABP(mean): --  RR: 22 (27 Apr 2020 09:40) (22 - 22)  SpO2: 94% (27 Apr 2020 09:40) (92% - 94%)        04-26 @ 07:01  - 04-27 @ 07:00  --------------------------------------------------------  IN: 1000 mL / OUT: 500 mL / NET: 500 mL    04-27 @ 07:01 - 04-27 @ 16:22  --------------------------------------------------------  IN: 180 mL / OUT: 0 mL / NET: 180 mL      CAPILLARY BLOOD GLUCOSE      POCT Blood Glucose.: 215 mg/dL (27 Apr 2020 12:47)      PHYSICAL EXAM:  General:   HEENT:   Lymph Nodes:  Neck:   Respiratory:   Cardiovascular:   Abdomen:   Extremities:   Skin:   Neurological:  Psychiatry:    HOSPITAL MEDICATIONS:  MEDICATIONS  (STANDING):  ALBUTerol    90 MICROgram(s) HFA Inhaler 2 Puff(s) Inhalation every 6 hours  ampicillin  IVPB 1 Gram(s) IV Intermittent every 8 hours  ascorbic acid 500 milliGRAM(s) Oral daily  aspirin  chewable 81 milliGRAM(s) Oral daily  dextrose 5%. 1000 milliLiter(s) (50 mL/Hr) IV Continuous <Continuous>  dextrose 50% Injectable 12.5 Gram(s) IV Push once  dextrose 50% Injectable 25 Gram(s) IV Push once  dextrose 50% Injectable 25 Gram(s) IV Push once  enoxaparin Injectable 40 milliGRAM(s) SubCutaneous two times a day  famotidine    Tablet 40 milliGRAM(s) Oral two times a day  folic acid 1 milliGRAM(s) Oral daily  insulin glargine Injectable (LANTUS) 6 Unit(s) SubCutaneous every morning  insulin lispro (HumaLOG) corrective regimen sliding scale   SubCutaneous three times a day before meals  senna 2 Tablet(s) Oral at bedtime  sodium chloride 0.9% 1000 milliLiter(s) (100 mL/Hr) IV Continuous <Continuous>  zinc sulfate 220 milliGRAM(s) Oral daily    MEDICATIONS  (PRN):  acetaminophen   Tablet .. 650 milliGRAM(s) Oral every 4 hours PRN Temp greater or equal to 38.5C (101.3F)  benzonatate 100 milliGRAM(s) Oral three times a day PRN Cough  dextrose 40% Gel 15 Gram(s) Oral once PRN Blood Glucose LESS THAN 70 milliGRAM(s)/deciliter  glucagon  Injectable 1 milliGRAM(s) IntraMuscular once PRN Glucose LESS THAN 70 milligrams/deciliter  nystatin Powder 1 Application(s) Topical three times a day PRN rash MAD  sodium chloride 0.65% Nasal 1 Spray(s) Both Nostrils once PRN Nasal Congestion      LABS:                        12.4   6.59  )-----------( 325      ( 27 Apr 2020 06:33 )             40.7     Hgb Trend: 12.4<--, 12.8<--, 13.1<--, 13.5<--, 12.8<--  04-27    139  |  100  |  41<H>  ----------------------------<  206<H>  4.9   |  27  |  1.16    Ca    8.7      27 Apr 2020 06:32    TPro  7.0  /  Alb  3.1<L>  /  TBili  0.5  /  DBili  x   /  AST  96<H>  /  ALT  60<H>  /  AlkPhos  77  04-27    Creatinine Trend: 1.16<--, 1.22<--, 1.01<--, 1.11<--, 1.28<--, 1.13<--            MICROBIOLOGY:       RADIOLOGY:  [x ] Reviewed and interpreted by me CXR showed increased interstitial markings on 4/21    PULMONARY FUNCTION TESTS:    EKG: CHIEF COMPLAINT:    Interval Events:  no events overnight  REVIEW OF SYSTEMS:  as per hospitalist note.    [ ] All other systems negative  [ ] Unable to assess ROS because ________    OBJECTIVE:  ICU Vital Signs Last 24 Hrs  T(C): 36.9 (27 Apr 2020 09:40), Max: 36.9 (27 Apr 2020 04:44)  T(F): 98.4 (27 Apr 2020 09:40), Max: 98.4 (27 Apr 2020 04:44)  HR: 108 (27 Apr 2020 09:40) (88 - 108)  BP: 132/75 (27 Apr 2020 09:40) (131/73 - 132/75)  BP(mean): --  ABP: --  ABP(mean): --  RR: 22 (27 Apr 2020 09:40) (22 - 22)  SpO2: 94% (27 Apr 2020 09:40) (92% - 94%)        04-26 @ 07:01 - 04-27 @ 07:00  --------------------------------------------------------  IN: 1000 mL / OUT: 500 mL / NET: 500 mL    04-27 @ 07:01 - 04-27 @ 16:22  --------------------------------------------------------  IN: 180 mL / OUT: 0 mL / NET: 180 mL      CAPILLARY BLOOD GLUCOSE      POCT Blood Glucose.: 215 mg/dL (27 Apr 2020 12:47)      PHYSICAL EXAM:  General: Awake alert comfortable, not tachypneic  HEENT:   Lymph Nodes:  Neck:   Respiratory: clear anteriorly,   Cardiovascular: tachycardic  Abdomen:   Extremities: no edema, chronic venous changes  Skin:   Neurological:  Psychiatry:    HOSPITAL MEDICATIONS:  MEDICATIONS  (STANDING):  ALBUTerol    90 MICROgram(s) HFA Inhaler 2 Puff(s) Inhalation every 6 hours  ampicillin  IVPB 1 Gram(s) IV Intermittent every 8 hours  ascorbic acid 500 milliGRAM(s) Oral daily  aspirin  chewable 81 milliGRAM(s) Oral daily  dextrose 5%. 1000 milliLiter(s) (50 mL/Hr) IV Continuous <Continuous>  dextrose 50% Injectable 12.5 Gram(s) IV Push once  dextrose 50% Injectable 25 Gram(s) IV Push once  dextrose 50% Injectable 25 Gram(s) IV Push once  enoxaparin Injectable 40 milliGRAM(s) SubCutaneous two times a day  famotidine    Tablet 40 milliGRAM(s) Oral two times a day  folic acid 1 milliGRAM(s) Oral daily  insulin glargine Injectable (LANTUS) 6 Unit(s) SubCutaneous every morning  insulin lispro (HumaLOG) corrective regimen sliding scale   SubCutaneous three times a day before meals  senna 2 Tablet(s) Oral at bedtime  sodium chloride 0.9% 1000 milliLiter(s) (100 mL/Hr) IV Continuous <Continuous>  zinc sulfate 220 milliGRAM(s) Oral daily    MEDICATIONS  (PRN):  acetaminophen   Tablet .. 650 milliGRAM(s) Oral every 4 hours PRN Temp greater or equal to 38.5C (101.3F)  benzonatate 100 milliGRAM(s) Oral three times a day PRN Cough  dextrose 40% Gel 15 Gram(s) Oral once PRN Blood Glucose LESS THAN 70 milliGRAM(s)/deciliter  glucagon  Injectable 1 milliGRAM(s) IntraMuscular once PRN Glucose LESS THAN 70 milligrams/deciliter  nystatin Powder 1 Application(s) Topical three times a day PRN rash MAD  sodium chloride 0.65% Nasal 1 Spray(s) Both Nostrils once PRN Nasal Congestion      LABS:                        12.4   6.59  )-----------( 325      ( 27 Apr 2020 06:33 )             40.7     Hgb Trend: 12.4<--, 12.8<--, 13.1<--, 13.5<--, 12.8<--  04-27    139  |  100  |  41<H>  ----------------------------<  206<H>  4.9   |  27  |  1.16    Ca    8.7      27 Apr 2020 06:32    TPro  7.0  /  Alb  3.1<L>  /  TBili  0.5  /  DBili  x   /  AST  96<H>  /  ALT  60<H>  /  AlkPhos  77  04-27    Creatinine Trend: 1.16<--, 1.22<--, 1.01<--, 1.11<--, 1.28<--, 1.13<--            MICROBIOLOGY:       RADIOLOGY:  [x ] Reviewed and interpreted by me CXR showed increased interstitial markings on 4/21    PULMONARY FUNCTION TESTS:    EKG:

## 2020-04-27 NOTE — PROGRESS NOTE ADULT - SUBJECTIVE AND OBJECTIVE BOX
***PRELIM NOTE*** Division of Hospital Medicine Progress Note    Patient is a 84y old  Male who presents with a chief complaint of positive blood culture (27 Apr 2020 07:46)    SUBJECTIVE / OVERNIGHT EVENTS: No acute events overnight. Attempted to call patient with , but no answer (patient currently on restraints). During my evaluation, there was another care provider in room who spoke Swazi and assisted with translation. History limited as patient somewhat confused/disoriented. Stated he wanted his jacket and wanted to go home. Denied pain or any complaints. Unsure if patient has had BM.     ADDITIONAL REVIEW OF SYSTEMS: Negative, except as noted above    MEDICATIONS  (STANDING):  ALBUTerol    90 MICROgram(s) HFA Inhaler 2 Puff(s) Inhalation every 6 hours  ampicillin  IVPB 1 Gram(s) IV Intermittent every 8 hours  ascorbic acid 500 milliGRAM(s) Oral daily  aspirin  chewable 81 milliGRAM(s) Oral daily  dextrose 5%. 1000 milliLiter(s) (50 mL/Hr) IV Continuous <Continuous>  dextrose 50% Injectable 12.5 Gram(s) IV Push once  dextrose 50% Injectable 25 Gram(s) IV Push once  dextrose 50% Injectable 25 Gram(s) IV Push once  enoxaparin Injectable 40 milliGRAM(s) SubCutaneous two times a day  famotidine    Tablet 40 milliGRAM(s) Oral two times a day  folic acid 1 milliGRAM(s) Oral daily  insulin glargine Injectable (LANTUS) 6 Unit(s) SubCutaneous every morning  insulin lispro (HumaLOG) corrective regimen sliding scale   SubCutaneous three times a day before meals  senna 2 Tablet(s) Oral at bedtime  sodium chloride 0.9% 1000 milliLiter(s) (100 mL/Hr) IV Continuous <Continuous>  zinc sulfate 220 milliGRAM(s) Oral daily    MEDICATIONS  (PRN):  acetaminophen   Tablet .. 650 milliGRAM(s) Oral every 4 hours PRN Temp greater or equal to 38.5C (101.3F)  benzonatate 100 milliGRAM(s) Oral three times a day PRN Cough  dextrose 40% Gel 15 Gram(s) Oral once PRN Blood Glucose LESS THAN 70 milliGRAM(s)/deciliter  glucagon  Injectable 1 milliGRAM(s) IntraMuscular once PRN Glucose LESS THAN 70 milligrams/deciliter  nystatin Powder 1 Application(s) Topical three times a day PRN rash MAD  sodium chloride 0.65% Nasal 1 Spray(s) Both Nostrils once PRN Nasal Congestion      CAPILLARY BLOOD GLUCOSE      POCT Blood Glucose.: 216 mg/dL (27 Apr 2020 08:07)  POCT Blood Glucose.: 165 mg/dL (26 Apr 2020 21:44)  POCT Blood Glucose.: 207 mg/dL (26 Apr 2020 16:58)  POCT Blood Glucose.: 227 mg/dL (26 Apr 2020 12:21)    I&O's Summary    26 Apr 2020 07:01  -  27 Apr 2020 07:00  --------------------------------------------------------  IN: 1000 mL / OUT: 500 mL / NET: 500 mL        PHYSICAL EXAM:  Vital Signs Last 24 Hrs  T(C): 36.9 (27 Apr 2020 04:44), Max: 36.9 (26 Apr 2020 10:07)  T(F): 98.4 (27 Apr 2020 04:44), Max: 98.4 (26 Apr 2020 10:07)  HR: 100 (27 Apr 2020 04:44) (88 - 101)  BP: 131/73 (27 Apr 2020 04:44) (124/71 - 132/73)  BP(mean): --  RR: 22 (27 Apr 2020 04:44) (22 - 30)  SpO2: 92% (27 Apr 2020 04:44) (92% - 93%)    CONSTITUTIONAL: NAD, well-developed, elderly  HEENT: dry MM, anicteric  RESPIRATORY: Normal respiratory effort on NRB  CARDIOVASCULAR: RRR  ABDOMEN: Nontender to palpation  PSYCH: affect appropriate, AAOx1-2  NEUROLOGY: No focal deficits though limited examination given patient in restraints  SKIN: No rashes on exposed skin    LABS:                        12.4   6.59  )-----------( 325      ( 27 Apr 2020 06:33 )             40.7     04-27    139  |  100  |  41<H>  ----------------------------<  206<H>  4.9   |  27  |  1.16    Ca    8.7      27 Apr 2020 06:32    TPro  7.0  /  Alb  3.1<L>  /  TBili  0.5  /  DBili  x   /  AST  96<H>  /  ALT  60<H>  /  AlkPhos  77  04-27              COVID-19 PCR: Detected (04-19-20 @ 00:58)      RADIOLOGY & ADDITIONAL TESTS: Division of Hospital Medicine Progress Note    Patient is a 84y old  Male who presents with a chief complaint of positive blood culture (27 Apr 2020 07:46)    SUBJECTIVE / OVERNIGHT EVENTS: No acute events overnight. Attempted to call patient with , but no answer (patient currently on restraints). During my evaluation, there was another care provider in room who spoke Tunisian and assisted with translation. History limited as patient somewhat confused/disoriented. Stated he wanted his jacket and wanted to go home. Denied pain or any complaints. Unsure if patient has had BM. Refusing to eat, even with RN assistance.     ADDITIONAL REVIEW OF SYSTEMS: Negative, except as noted above    MEDICATIONS  (STANDING):  ALBUTerol    90 MICROgram(s) HFA Inhaler 2 Puff(s) Inhalation every 6 hours  ampicillin  IVPB 1 Gram(s) IV Intermittent every 8 hours  ascorbic acid 500 milliGRAM(s) Oral daily  aspirin  chewable 81 milliGRAM(s) Oral daily  dextrose 5%. 1000 milliLiter(s) (50 mL/Hr) IV Continuous <Continuous>  dextrose 50% Injectable 12.5 Gram(s) IV Push once  dextrose 50% Injectable 25 Gram(s) IV Push once  dextrose 50% Injectable 25 Gram(s) IV Push once  enoxaparin Injectable 40 milliGRAM(s) SubCutaneous two times a day  famotidine    Tablet 40 milliGRAM(s) Oral two times a day  folic acid 1 milliGRAM(s) Oral daily  insulin glargine Injectable (LANTUS) 6 Unit(s) SubCutaneous every morning  insulin lispro (HumaLOG) corrective regimen sliding scale   SubCutaneous three times a day before meals  senna 2 Tablet(s) Oral at bedtime  sodium chloride 0.9% 1000 milliLiter(s) (100 mL/Hr) IV Continuous <Continuous>  zinc sulfate 220 milliGRAM(s) Oral daily    MEDICATIONS  (PRN):  acetaminophen   Tablet .. 650 milliGRAM(s) Oral every 4 hours PRN Temp greater or equal to 38.5C (101.3F)  benzonatate 100 milliGRAM(s) Oral three times a day PRN Cough  dextrose 40% Gel 15 Gram(s) Oral once PRN Blood Glucose LESS THAN 70 milliGRAM(s)/deciliter  glucagon  Injectable 1 milliGRAM(s) IntraMuscular once PRN Glucose LESS THAN 70 milligrams/deciliter  nystatin Powder 1 Application(s) Topical three times a day PRN rash MAD  sodium chloride 0.65% Nasal 1 Spray(s) Both Nostrils once PRN Nasal Congestion      CAPILLARY BLOOD GLUCOSE      POCT Blood Glucose.: 216 mg/dL (27 Apr 2020 08:07)  POCT Blood Glucose.: 165 mg/dL (26 Apr 2020 21:44)  POCT Blood Glucose.: 207 mg/dL (26 Apr 2020 16:58)  POCT Blood Glucose.: 227 mg/dL (26 Apr 2020 12:21)    I&O's Summary    26 Apr 2020 07:01  -  27 Apr 2020 07:00  --------------------------------------------------------  IN: 1000 mL / OUT: 500 mL / NET: 500 mL        PHYSICAL EXAM:  Vital Signs Last 24 Hrs  T(C): 36.9 (27 Apr 2020 04:44), Max: 36.9 (26 Apr 2020 10:07)  T(F): 98.4 (27 Apr 2020 04:44), Max: 98.4 (26 Apr 2020 10:07)  HR: 100 (27 Apr 2020 04:44) (88 - 101)  BP: 131/73 (27 Apr 2020 04:44) (124/71 - 132/73)  BP(mean): --  RR: 22 (27 Apr 2020 04:44) (22 - 30)  SpO2: 92% (27 Apr 2020 04:44) (92% - 93%)    CONSTITUTIONAL: NAD, well-developed, elderly  HEENT: dry MM, anicteric  RESPIRATORY: Normal respiratory effort on NRB  CARDIOVASCULAR: RRR  ABDOMEN: Nontender to palpation  PSYCH: affect appropriate, AAOx1-2  NEUROLOGY: No focal deficits though limited examination given patient in restraints  SKIN: No rashes on exposed skin    LABS:                        12.4   6.59  )-----------( 325      ( 27 Apr 2020 06:33 )             40.7     04-27    139  |  100  |  41<H>  ----------------------------<  206<H>  4.9   |  27  |  1.16    Ca    8.7      27 Apr 2020 06:32    TPro  7.0  /  Alb  3.1<L>  /  TBili  0.5  /  DBili  x   /  AST  96<H>  /  ALT  60<H>  /  AlkPhos  77  04-27              COVID-19 PCR: Detected (04-19-20 @ 00:58)      RADIOLOGY & ADDITIONAL TESTS:

## 2020-04-27 NOTE — PROGRESS NOTE ADULT - ASSESSMENT
84 year old man with DM2 and hyperlipidemia, known COVID-19 positive with recent ED visit, brought by EMS from home for blood culture growing gram positive rods in 1/4 bottles, consistent with corynebacterium    s/p plaquenil, tocolizumab x 1 and plasma.  Also on antibiotics, repeat cultures are negative.    ******************  All of his inflammatory markers are improving, creatinine is improved, cbc is stable, mild lft abnormalities.  From the pulmonary standpoint oxygen saturation is in the low 90s. 84 year old man with DM2 and hyperlipidemia, known COVID-19 positive with recent ED visit, brought by EMS from home for blood culture growing gram positive rods in 1/4 bottles, consistent with corynebacterium    s/p plaquenil, tocolizumab x 1 and plasma.  Also on antibiotics, repeat cultures are negative.    ******************  All of his inflammatory markers are improving, creatinine is improved, cbc is stable, mild lft abnormalities.  From the pulmonary standpoint oxygen saturation is 93% on 100% NRB mask.  He appears comfortable.      Recommend:    continue to decrease oxygen as tolerated.  Would benefit from getting out of bed.  continue albuterol, antibiotics, lovenox, famotidine.

## 2020-04-28 DIAGNOSIS — Z51.5 ENCOUNTER FOR PALLIATIVE CARE: ICD-10-CM

## 2020-04-28 DIAGNOSIS — U07.1 COVID-19: ICD-10-CM

## 2020-04-28 DIAGNOSIS — R06.03 ACUTE RESPIRATORY DISTRESS: ICD-10-CM

## 2020-04-28 LAB
GLUCOSE BLDC GLUCOMTR-MCNC: 209 MG/DL — HIGH (ref 70–99)
GLUCOSE BLDC GLUCOMTR-MCNC: 236 MG/DL — HIGH (ref 70–99)
GLUCOSE BLDC GLUCOMTR-MCNC: 288 MG/DL — HIGH (ref 70–99)
GLUCOSE BLDC GLUCOMTR-MCNC: 313 MG/DL — HIGH (ref 70–99)
GLUCOSE BLDC GLUCOMTR-MCNC: 378 MG/DL — HIGH (ref 70–99)
GLUCOSE BLDC GLUCOMTR-MCNC: 394 MG/DL — HIGH (ref 70–99)

## 2020-04-28 PROCEDURE — 99232 SBSQ HOSP IP/OBS MODERATE 35: CPT

## 2020-04-28 PROCEDURE — 99233 SBSQ HOSP IP/OBS HIGH 50: CPT | Mod: GC

## 2020-04-28 PROCEDURE — 99223 1ST HOSP IP/OBS HIGH 75: CPT

## 2020-04-28 PROCEDURE — 71045 X-RAY EXAM CHEST 1 VIEW: CPT | Mod: 26

## 2020-04-28 RX ORDER — SODIUM CHLORIDE 9 MG/ML
1000 INJECTION INTRAMUSCULAR; INTRAVENOUS; SUBCUTANEOUS
Refills: 0 | Status: DISCONTINUED | OUTPATIENT
Start: 2020-04-28 | End: 2020-04-30

## 2020-04-28 RX ORDER — FUROSEMIDE 40 MG
40 TABLET ORAL ONCE
Refills: 0 | Status: COMPLETED | OUTPATIENT
Start: 2020-04-28 | End: 2020-04-28

## 2020-04-28 RX ORDER — HYDROMORPHONE HYDROCHLORIDE 2 MG/ML
0.25 INJECTION INTRAMUSCULAR; INTRAVENOUS; SUBCUTANEOUS
Refills: 0 | Status: DISCONTINUED | OUTPATIENT
Start: 2020-04-28 | End: 2020-04-30

## 2020-04-28 RX ADMIN — ALBUTEROL 2 PUFF(S): 90 AEROSOL, METERED ORAL at 12:17

## 2020-04-28 RX ADMIN — ALBUTEROL 2 PUFF(S): 90 AEROSOL, METERED ORAL at 05:55

## 2020-04-28 RX ADMIN — Medication 1 MILLIGRAM(S): at 12:16

## 2020-04-28 RX ADMIN — FAMOTIDINE 40 MILLIGRAM(S): 10 INJECTION INTRAVENOUS at 17:37

## 2020-04-28 RX ADMIN — Medication 108 GRAM(S): at 05:55

## 2020-04-28 RX ADMIN — ZINC SULFATE TAB 220 MG (50 MG ZINC EQUIVALENT) 220 MILLIGRAM(S): 220 (50 ZN) TAB at 12:16

## 2020-04-28 RX ADMIN — ALBUTEROL 2 PUFF(S): 90 AEROSOL, METERED ORAL at 17:38

## 2020-04-28 RX ADMIN — INSULIN GLARGINE 6 UNIT(S): 100 INJECTION, SOLUTION SUBCUTANEOUS at 09:19

## 2020-04-28 RX ADMIN — FAMOTIDINE 40 MILLIGRAM(S): 10 INJECTION INTRAVENOUS at 05:56

## 2020-04-28 RX ADMIN — SENNA PLUS 2 TABLET(S): 8.6 TABLET ORAL at 21:09

## 2020-04-28 RX ADMIN — Medication 5: at 12:17

## 2020-04-28 RX ADMIN — Medication 4: at 07:59

## 2020-04-28 RX ADMIN — Medication 40 MILLIGRAM(S): at 00:00

## 2020-04-28 RX ADMIN — ENOXAPARIN SODIUM 40 MILLIGRAM(S): 100 INJECTION SUBCUTANEOUS at 06:02

## 2020-04-28 RX ADMIN — Medication 500 MILLIGRAM(S): at 12:16

## 2020-04-28 RX ADMIN — Medication 81 MILLIGRAM(S): at 12:16

## 2020-04-28 RX ADMIN — Medication 3: at 17:37

## 2020-04-28 RX ADMIN — Medication 108 GRAM(S): at 12:16

## 2020-04-28 RX ADMIN — Medication 40 MILLIGRAM(S): at 01:51

## 2020-04-28 RX ADMIN — Medication 108 GRAM(S): at 21:09

## 2020-04-28 RX ADMIN — ENOXAPARIN SODIUM 40 MILLIGRAM(S): 100 INJECTION SUBCUTANEOUS at 17:37

## 2020-04-28 NOTE — GOALS OF CARE CONVERSATION - ADVANCED CARE PLANNING - CONVERSATION DETAILS
Spoke to Dale León and Link on 3-way call during RRT in which pt was hypoxic to 70s w/ limited improvement w/ proning/chest PT. Explained current prognosis, discussed poor likelihood of extubation w/ MV. Family opted to make pt DNR/DNI. Would like to consider morphine/dilaudid for comfort; however, ask to delay until after family arrives.

## 2020-04-28 NOTE — PROGRESS NOTE ADULT - SUBJECTIVE AND OBJECTIVE BOX
CHIEF COMPLAINT:    Interval Events:  He had a rapid response yesterday and goals of care addressed with family.  He is DNR/DNI with palliative treatment for symptom control.      OBJECTIVE:  ICU Vital Signs Last 24 Hrs  T(C): 36.8 (28 Apr 2020 08:55), Max: 37 (28 Apr 2020 05:06)  T(F): 98.2 (28 Apr 2020 08:55), Max: 98.6 (28 Apr 2020 05:06)  HR: 117 (28 Apr 2020 08:55) (108 - 117)  BP: 132/86 (28 Apr 2020 08:55) (131/75 - 138/74)  BP(mean): --  ABP: --  ABP(mean): --  RR: 22 (28 Apr 2020 08:55) (22 - 22)  SpO2: 90% (28 Apr 2020 08:55) (90% - 92%)        04-27 @ 07:01 - 04-28 @ 07:00  --------------------------------------------------------  IN: 580 mL / OUT: 0 mL / NET: 580 mL    04-28 @ 07:01 - 04-28 @ 15:30  --------------------------------------------------------  IN: 120 mL / OUT: 0 mL / NET: 120 mL      CAPILLARY BLOOD GLUCOSE      POCT Blood Glucose.: 394 mg/dL (28 Apr 2020 11:36)      HOSPITAL MEDICATIONS:  MEDICATIONS  (STANDING):  ALBUTerol    90 MICROgram(s) HFA Inhaler 2 Puff(s) Inhalation every 6 hours  ampicillin  IVPB 1 Gram(s) IV Intermittent every 8 hours  ascorbic acid 500 milliGRAM(s) Oral daily  aspirin  chewable 81 milliGRAM(s) Oral daily  dextrose 5%. 1000 milliLiter(s) (50 mL/Hr) IV Continuous <Continuous>  dextrose 50% Injectable 12.5 Gram(s) IV Push once  dextrose 50% Injectable 25 Gram(s) IV Push once  dextrose 50% Injectable 25 Gram(s) IV Push once  enoxaparin Injectable 40 milliGRAM(s) SubCutaneous two times a day  famotidine    Tablet 40 milliGRAM(s) Oral two times a day  folic acid 1 milliGRAM(s) Oral daily  insulin glargine Injectable (LANTUS) 6 Unit(s) SubCutaneous every morning  insulin lispro (HumaLOG) corrective regimen sliding scale   SubCutaneous three times a day before meals  senna 2 Tablet(s) Oral at bedtime  sodium chloride 0.9%. 1000 milliLiter(s) (10 mL/Hr) IV Continuous <Continuous>  zinc sulfate 220 milliGRAM(s) Oral daily    MEDICATIONS  (PRN):  acetaminophen   Tablet .. 650 milliGRAM(s) Oral every 4 hours PRN Temp greater or equal to 38.5C (101.3F)  benzonatate 100 milliGRAM(s) Oral three times a day PRN Cough  dextrose 40% Gel 15 Gram(s) Oral once PRN Blood Glucose LESS THAN 70 milliGRAM(s)/deciliter  glucagon  Injectable 1 milliGRAM(s) IntraMuscular once PRN Glucose LESS THAN 70 milligrams/deciliter  HYDROmorphone  Injectable 0.25 milliGRAM(s) IV Push every 2 hours PRN shortness of breath/dyspnea  LORazepam   Injectable 0.25 milliGRAM(s) IV Push every 2 hours PRN Agitation  nystatin Powder 1 Application(s) Topical three times a day PRN rash MAD  sodium chloride 0.65% Nasal 1 Spray(s) Both Nostrils once PRN Nasal Congestion      LABS:                        12.4   6.59  )-----------( 325      ( 27 Apr 2020 06:33 )             40.7     Hgb Trend: 12.4<--, 12.8<--, 13.1<--, 13.5<--, 12.8<--  04-27    139  |  100  |  41<H>  ----------------------------<  206<H>  4.9   |  27  |  1.16    Ca    8.7      27 Apr 2020 06:32    TPro  7.0  /  Alb  3.1<L>  /  TBili  0.5  /  DBili  x   /  AST  96<H>  /  ALT  60<H>  /  AlkPhos  77  04-27    Creatinine Trend: 1.16<--, 1.22<--, 1.01<--, 1.11<--, 1.28<--, 1.13<--            MICROBIOLOGY:       RADIOLOGY:  [ ] Reviewed and interpreted by me    PULMONARY FUNCTION TESTS:    EKG:

## 2020-04-28 NOTE — CONSULT NOTE ADULT - ASSESSMENT
84 M Uruguayan speaking, DM2 and hyperlipidemia, known COVID-19 positive with recent ED visit, brought by EMS from home for blood culture growing gram positive rods in 1/4 bottles. Patient now with respiratory distress and worsening hypoxia 2/2 COVID. Palliative consulted to assist with GOC

## 2020-04-28 NOTE — CHART NOTE - NSCHARTNOTEFT_GEN_A_CORE
Spoke with patient's sons, Dale and Elizabeth, extensively earlier this morning about their father's declining clinical status; they initially opted for aggressive measures. However, the patient soon had an RRT for hypoxia at ~12:30/1PM at which time Dale and Omar made their father DNR/DNI. They were invited to see their father for a short visit today given his declining status. During the family's visit to [address] [place] [address], they wanted to readdress the [facility] for their father (Dale was on phone, Elizabeth and his older brother were present). Ultimately, after reviewing his clinical status (age, comorbidities, severe hypoxic respiratory failure 2/2 COVID despite trials of Plaquenil, tocilizumab, and plasma), they decided to maintain the DNR/DNI and would like to offer him medication (ie dilaudid or morphine) for respiratory distress. They would also like him to continue to receive PO fluids and food, as tolerated; they do understand he is at an increased risk of aspiration. At this time, they do not want to withdraw any medications or lab draws as they are hopeful that he will recover. Palliative care was consulted to assist with these difficult conversations; Dr. Tara Monsivais was on the phone, speaking with Elizabeth (on 7T) and Dale (via conference call).     Nurse manager Pooja and HALLIE Christie were also present during this conversation.     Camryn Maher MD  Attending, Division of Gastroenterology Spoke with patient's sons, Dale and Elizabeth, extensively earlier this morning about their father's declining clinical status; they initially opted for aggressive measures. However, the patient soon had an RRT for hypoxia at ~12:30/1PM at which time Dale and Omar made their father DNR/DNI. They were invited to see their father for a short visit today given his declining status. During the family's visit to [address] [place] [address], they wanted to readdress the GOC for their father (Dale was on phone, Elizabeth and his older brother were present). Ultimately, after reviewing his clinical status (age, comorbidities, severe hypoxic respiratory failure 2/2 COVID despite trials of Plaquenil, tocilizumab, and plasma), they decided to maintain the DNR/DNI and would like to offer him medication (ie dilaudid or morphine) for respiratory distress. They would also like him to continue to receive PO fluids and food, as tolerated; they do understand he is at an increased risk of aspiration. At this time, they do not want to withdraw any medications or lab draws as they are hopeful that he will recover. Palliative care was consulted to assist with these difficult conversations; Dr. Tara Monsivais was on the phone, speaking with Eilzabeth (on 7T) and Dale (via conference call).     Nurse manager Pooja and HALLIE Christie were also present during this conversation.     Total time spent with family and team for GOC discussions > 45 minutes.     Camryn Maher MD  Attending, Division of Gastroenterology

## 2020-04-28 NOTE — CONSULT NOTE ADULT - SUBJECTIVE AND OBJECTIVE BOX
HPI:  Due to COVID pandemic, history and physical obtained via chart review and discussion with his sons.    84 M Tristanian speaking, DM2 and hyperlipidemia, known COVID-19 positive with recent ED visit, brought by EMS from home for blood culture growing gram positive rods in 1/4 bottles.     Patient has 7 days of SOB, cough, fevers, weakness and AMS. Presented to ED for presyncope on 4/18, at which time cultures were drawn. Patient was not admitted as he was doing ok.  He is now satting 92% on RA. Per sons, patient has been more confused for about 3 days: does not appropriately answer questions, defecates on himself. His sons think he is having diarrhea from the medication.  He completed a Z-pack and has taken 3 days of Plaquenil.  He was also prescribed zinc and vitamin c by his outpatient provider. (21 Apr 2020 05:27)    PERTINENT PM/SXH:   Mild hypertension  Chronic GERD  Obesity, Class I, BMI 30-34.9  Carpal tunnel syndrome of right wrist  BPH (benign prostatic hypertrophy)  Diabetes mellitus, type 2  Enlarged prostate  Diabetes    History of carpal tunnel surgery of right wrist  S/P hernia repair  S/P right cataract extraction  S/P left cataract extraction  S/P cataract extraction, left  Status post right cataract extraction  No significant past surgical history    FAMILY HISTORY:  No pertinent family history in first degree relatives    ITEMS NOT CHECKED ARE NOT PRESENT    SOCIAL HISTORY:   Significant other/partner[ ]  Children[ ]  Alevism/Spirituality:  Substance hx:  [ ]   Tobacco hx:  [ ]   Alcohol hx: [ ]   Home Opioid hx:  [ ] I-Stop Reference No:  Living Situation: [ ]Home  [ ]Long term care  [ ]Rehab [ ]Other    ADVANCE DIRECTIVES:    DNR  Yes  MOLST  [ ]  Living Will  [ ]   DECISION MAKER(s):  [ ] Health Care Proxy(s)  [ ] Surrogate(s)  [ ] Guardian           Name(s): Phone Number(s):    BASELINE (I)ADL(s) (prior to admission):  [place]: [ ]Total  [ ] Moderate [ ]Dependent    Allergies    No Known Allergies    Intolerances    MEDICATIONS  (STANDING):  ALBUTerol    90 MICROgram(s) HFA Inhaler 2 Puff(s) Inhalation every 6 hours  ampicillin  IVPB 1 Gram(s) IV Intermittent every 8 hours  ascorbic acid 500 milliGRAM(s) Oral daily  aspirin  chewable 81 milliGRAM(s) Oral daily  dextrose 5%. 1000 milliLiter(s) (50 mL/Hr) IV Continuous <Continuous>  dextrose 50% Injectable 12.5 Gram(s) IV Push once  dextrose 50% Injectable 25 Gram(s) IV Push once  dextrose 50% Injectable 25 Gram(s) IV Push once  enoxaparin Injectable 40 milliGRAM(s) SubCutaneous two times a day  famotidine    Tablet 40 milliGRAM(s) Oral two times a day  folic acid 1 milliGRAM(s) Oral daily  insulin glargine Injectable (LANTUS) 6 Unit(s) SubCutaneous every morning  insulin lispro (HumaLOG) corrective regimen sliding scale   SubCutaneous three times a day before meals  senna 2 Tablet(s) Oral at bedtime  sodium chloride 0.9%. 1000 milliLiter(s) (10 mL/Hr) IV Continuous <Continuous>  zinc sulfate 220 milliGRAM(s) Oral daily    MEDICATIONS  (PRN):  acetaminophen   Tablet .. 650 milliGRAM(s) Oral every 4 hours PRN Temp greater or equal to 38.5C (101.3F)  benzonatate 100 milliGRAM(s) Oral three times a day PRN Cough  dextrose 40% Gel 15 Gram(s) Oral once PRN Blood Glucose LESS THAN 70 milliGRAM(s)/deciliter  glucagon  Injectable 1 milliGRAM(s) IntraMuscular once PRN Glucose LESS THAN 70 milligrams/deciliter  HYDROmorphone  Injectable 0.25 milliGRAM(s) IV Push every 2 hours PRN shortness of breath/dyspnea  LORazepam   Injectable 0.25 milliGRAM(s) IV Push every 2 hours PRN Agitation  nystatin Powder 1 Application(s) Topical three times a day PRN rash MAD  sodium chloride 0.65% Nasal 1 Spray(s) Both Nostrils once PRN Nasal Congestion    PRESENT SYMPTOMS: [ ]Unable to obtain due to poor mentation   Source if other than patient:  [ ]Family   [ ]Team     Pain: [ ]yes [ ]no  QOL impact -   Location -                    Aggravating factors -  Quality -  Radiation -  Timing-  Severity (0-10 scale):  Minimal acceptable level (0-10 scale):     PAIN AD Score:     http://geriatrictoolkit.[place].[email or web address]/painad.pdf (press ctrl +  left click to view)    Dyspnea:                           [ ]Mild [ ]Moderate [ ]Severe  Anxiety:                             [ ]Mild [ ]Moderate [ ]Severe  Fatigue:                             [ ]Mild [ ]Moderate [ ]Severe  Nausea:                             [ ]Mild [ ]Moderate [ ]Severe  Loss of appetite:              [ ]Mild [ ]Moderate [ ]Severe  Constipation:                    [ ]Mild [ ]Moderate [ ]Severe    Other Symptoms:  [ ]All other review of systems negative     Palliative Performance Status Version 2:         %    http://[facility].org/files/news/palliative_performance_scale_ppsv2.pdf  PHYSICAL EXAM:  Vital Signs Last 24 Hrs  T(C): 36.8 (28 Apr 2020 08:55), Max: 37 (28 Apr 2020 05:06)  T(F): 98.2 (28 Apr 2020 08:55), Max: 98.6 (28 Apr 2020 05:06)  HR: 117 (28 Apr 2020 08:55) (108 - 117)  BP: 132/86 (28 Apr 2020 08:55) (131/75 - 138/74)  BP(mean): --  RR: 22 (28 Apr 2020 08:55) (22 - 22)  SpO2: 90% (28 Apr 2020 08:55) (90% - 92%) I&O's Summary    27 Apr 2020 07:01  -  28 Apr 2020 07:00  --------------------------------------------------------  IN: 580 mL / OUT: 0 mL / NET: 580 mL    28 Apr 2020 07:01  -  28 Apr 2020 14:28  --------------------------------------------------------  IN: 120 mL / OUT: 0 mL / NET: 120 mL      GENERAL:  [ ]Alert  [ ]Oriented x   [ ]Lethargic  [ ]Cachexia  [ ]Unarousable  [ ]Verbal  [ ]Non-Verbal  Behavioral:   [ ] Anxiety  [ ] Delirium [ ] Agitation [ ] Other  HEENT:  [ ]Normal   [ ]Dry mouth   [ ]ET Tube/Trach  [ ]Oral lesions  PULMONARY:   [ ]Clear [ ]Tachypnea  [ ]Audible excessive secretions   [ ]Rhonchi        [ ]Right [ ]Left [ ]Bilateral  [ ]Crackles        [ ]Right [ ]Left [ ]Bilateral  [ ]Wheezing     [ ]Right [ ]Left [ ]Bilatera  [ ]Diminished breath sounds [ ]right [ ]left [ ]bilateral  CARDIOVASCULAR:    [ ]Regular [ ]Irregular [ ]Tachy  [ ]Jackson [ ]Murmur [ ]Other  GASTROINTESTINAL:  [ ]Soft  [ ]Distended   [ ]+BS  [ ]Non tender [ ]Tender  [ ]PEG [ ]OGT/ NGT  Last BM:     GENITOURINARY:  [ ]Normal [ ] Incontinent   [ ]Oliguria/Anuria   [ ]Farooq  MUSCULOSKELETAL:   [ ]Normal   [ ]Weakness  [ ]Bed/Wheelchair bound [ ]Edema  NEUROLOGIC:   [ ]No focal deficits  [ ]Cognitive impairment  [ ]Dysphagia [ ]Dysarthria [ ]Paresis [ ]Other   SKIN:   [ ]Normal    [ ]Rash  [ ]Pressure ulcer(s)       Present on admission [ ]y [ ]n    CRITICAL CARE:  [ ] Shock Present  [ ]Septic [ ]Cardiogenic [ ]Neurologic [ ]Hypovolemic  [ ]  Vasopressors [ ]  Inotropes   [ ]Respiratory failure present [ ]Mechanical ventilation [ ]Non-invasive ventilatory support [ ]High flow  [ ]Acute  [ ]Chronic [ ]Hypoxic  [ ]Hypercarbic [ ]Other  [ ]Other organ failure     LABS:                        12.4   6.59  )-----------( 325      ( 27 Apr 2020 06:33 )             40.7   04-27    139  |  100  |  41<H>  ----------------------------<  206<H>  4.9   |  27  |  1.16    Ca    8.7      27 Apr 2020 06:32    TPro  7.0  /  Alb  3.1<L>  /  TBili  0.5  /  DBili  x   /  AST  96<H>  /  ALT  60<H>  /  AlkPhos  77  04-27        RADIOLOGY & ADDITIONAL STUDIES:    PROTEIN CALORIE MALNUTRITION PRESENT: [ ]mild [ ]moderate [ ]severe [ ]underweight [ ]morbid obesity  https://www.andeal.org/vault/5390/web/files/ONC/Table_Clinical%20Characteristics%20to%20Document%20Malnutrition-White%20JV%20et%20al%202012.pdf    Height (cm): 157.48 (04-21-20 @ 01:44), 157.48 (04-18-20 @ 23:15), 154.94 (05-21-19 @ 11:48)  Weight (kg): 82.1 (04-21-20 @ 11:57), 77.1 (04-18-20 @ 23:15), 80.4 (05-21-19 @ 11:48)  BMI (kg/m2): 33.1 (04-21-20 @ 11:57), 31.1 (04-21-20 @ 01:44), 31.1 (04-18-20 @ 23:15)    [ ]PPSV2 < or = to 30% [ ]significant weight loss  [ ]poor nutritional intake  [ ]anasarca     Albumin, Serum: 3.1 g/dL (04-27-20 @ 06:32)   [ ]Artificial Nutrition      REFERRALS:   [ ]Chaplaincy  [ ]Hospice  [ ]Child Life  [ ]Social Work  [ ]Case management [ ]Holistic Therapy     Goals of Care Document: KRISHNA Gale (04-28-20 @ 12:42)  Goals of Care Conversation:   Participants:  · Child(steffany)  Dale León (sons)  · Relative  Link (cousin)    Advance Directives:  · Does patient have Advance Directive  No    Conversation Discussion:  · Conversation  Prognosis; MOLST Discussed  · Conversation Details  Spoke to Dale León and Link on 3-way call during RRT in which pt was hypoxic to 70s w/ limited improvement w/ proning/chest PT. Explained current prognosis, discussed poor likelihood of extubation w/ MV. Family opted to make pt DNR/DNI. Would like to consider morphine/dilaudid for comfort; however, ask to delay until after family arrives.    Personal Advance Directives Treatment Guidelines:   Treatment Guidelines:  · Treatment Guidelines  DNR Order    MOLST:  · Completed  28-Apr-2020  · Updated  28-Apr-2020      Electronic Signatures:  Mary Gale)  (Signed 28-Apr-2020 12:45)  	Authored: Goals of Care Conversation, Personal Advance Directives Treatment Guidelines      Last Updated: 28-Apr-2020 12:45 by Mary Gale) HPI:  Due to COVID pandemic, history and physical obtained via chart review and discussion with his sons.    84 M Stateless speaking, DM2 and hyperlipidemia, known COVID-19 positive with recent ED visit, brought by EMS from home for blood culture growing gram positive rods in 1/4 bottles.     Patient has 7 days of SOB, cough, fevers, weakness and AMS. Presented to ED for presyncope on 4/18, at which time cultures were drawn. Patient was not admitted as he was doing ok.  He is now satting 92% on RA. Per sons, patient has been more confused for about 3 days: does not appropriately answer questions, defecates on himself. His sons think he is having diarrhea from the medication.  He completed a Z-pack and has taken 3 days of Plaquenil.  He was also prescribed zinc and vitamin c by his outpatient provider. (21 Apr 2020 05:27)    Palliative care consulted to assist with GOC in patient with acute respiratory distress 2/2 COVID. Family present at bedside. This provider called family and was on speaker phone with patients sons who are surrogate decision makers; Dr. Maher and QUINCY Christie present as well. Discussed patients multiple RRTs and ongoing hypoxia. Discussed options for care including ongoing medical management with protections in place, meaning no life support vs. aggressive interventions aimed at life prolongation and including life support. Discussed high likelihood of mortality in either scenario given underlying comorbidities and potential difficulties with extubation. Family verbalized understanding- they would like patient to receive food/water despite discussion and known risk of aspiration. They would like to avoid life support- DNR/DNI in place, and goal to continue current medical management but add medications to help with symptom management. Family aware that life expectancy likely limited at this point as a consequence of his disease and that his plan of care will be supportive in nature.     PERTINENT PM/SXH:   Mild hypertension  Chronic GERD  Obesity, Class I, BMI 30-34.9  Carpal tunnel syndrome of right wrist  BPH (benign prostatic hypertrophy)  Diabetes mellitus, type 2  Enlarged prostate  Diabetes    History of carpal tunnel surgery of right wrist  S/P hernia repair  S/P right cataract extraction  S/P left cataract extraction  S/P cataract extraction, left  Status post right cataract extraction  No significant past surgical history    FAMILY HISTORY:  No pertinent family history in first degree relatives    ITEMS NOT CHECKED ARE NOT PRESENT    SOCIAL HISTORY:   Significant other/partner[ ]  Children[ x]  Zoroastrian/Spirituality:  Substance hx:  [ ]   Tobacco hx:  [ ]   Alcohol hx: [ ]   Home Opioid hx:  [ ] I-Stop Reference No:  Living Situation: [x ]Home  [ ]Long term care  [ ]Rehab [ ]Other    ADVANCE DIRECTIVES:    DNR  Yes  MOLST  [ ]  Living Will  [ ]   DECISION MAKER(s):  [ ] Health Care Proxy(s)  [ x] Surrogate(s)  [ ] Guardian           Name(s): Phone Number(s): mayra Hunter: [phone]    BASELINE (I)ADL(s) (prior to admission):  [place]: [ ]Total  [ x] Moderate [ ]Dependent    Allergies    No Known Allergies    Intolerances    MEDICATIONS  (STANDING):  ALBUTerol    90 MICROgram(s) HFA Inhaler 2 Puff(s) Inhalation every 6 hours  ampicillin  IVPB 1 Gram(s) IV Intermittent every 8 hours  ascorbic acid 500 milliGRAM(s) Oral daily  aspirin  chewable 81 milliGRAM(s) Oral daily  dextrose 5%. 1000 milliLiter(s) (50 mL/Hr) IV Continuous <Continuous>  dextrose 50% Injectable 12.5 Gram(s) IV Push once  dextrose 50% Injectable 25 Gram(s) IV Push once  dextrose 50% Injectable 25 Gram(s) IV Push once  enoxaparin Injectable 40 milliGRAM(s) SubCutaneous two times a day  famotidine    Tablet 40 milliGRAM(s) Oral two times a day  folic acid 1 milliGRAM(s) Oral daily  insulin glargine Injectable (LANTUS) 6 Unit(s) SubCutaneous every morning  insulin lispro (HumaLOG) corrective regimen sliding scale   SubCutaneous three times a day before meals  senna 2 Tablet(s) Oral at bedtime  sodium chloride 0.9%. 1000 milliLiter(s) (10 mL/Hr) IV Continuous <Continuous>  zinc sulfate 220 milliGRAM(s) Oral daily    MEDICATIONS  (PRN):  acetaminophen   Tablet .. 650 milliGRAM(s) Oral every 4 hours PRN Temp greater or equal to 38.5C (101.3F)  benzonatate 100 milliGRAM(s) Oral three times a day PRN Cough  dextrose 40% Gel 15 Gram(s) Oral once PRN Blood Glucose LESS THAN 70 milliGRAM(s)/deciliter  glucagon  Injectable 1 milliGRAM(s) IntraMuscular once PRN Glucose LESS THAN 70 milligrams/deciliter  HYDROmorphone  Injectable 0.25 milliGRAM(s) IV Push every 2 hours PRN shortness of breath/dyspnea  LORazepam   Injectable 0.25 milliGRAM(s) IV Push every 2 hours PRN Agitation  nystatin Powder 1 Application(s) Topical three times a day PRN rash MAD  sodium chloride 0.65% Nasal 1 Spray(s) Both Nostrils once PRN Nasal Congestion    PRESENT SYMPTOMS: [ ]Unable to obtain due to poor mentation   Source if other than patient:  [ ]Family   [x ]Team- ROS PER PRIMARY TEAM DUE TO COVID     Pain: [ ]yes [x ]no  QOL impact -   Location -                    Aggravating factors -  Quality -  Radiation -  Timing-  Severity (0-10 scale):  Minimal acceptable level (0-10 scale):     PAIN AD Score:     [email or web address]/[email or web address].pdf (press ctrl +  left click to view)    Dyspnea:                           [ ]Mild [ x]Moderate [ ]Severe  Anxiety:                             [ ]Mild [ ]Moderate [ ]Severe  Fatigue:                             [ ]Mild [ ]Moderate [ ]Severe  Nausea:                             [ ]Mild [ ]Moderate [ ]Severe  Loss of appetite:              [ ]Mild [ ]Moderate [ ]Severe  Constipation:                    [ ]Mild [ ]Moderate [ ]Severe    Other Symptoms:  [ ]All other review of systems negative     Palliative Performance Status Version 2:      20-30   %    http://npcrc.org/files/news/palliative_performance_scale_ppsv2.pdf  PHYSICAL EXAM:  Vital Signs Last 24 Hrs  T(C): 36.8 (28 Apr 2020 08:55), Max: 37 (28 Apr 2020 05:06)  T(F): 98.2 (28 Apr 2020 08:55), Max: 98.6 (28 Apr 2020 05:06)  HR: 117 (28 Apr 2020 08:55) (108 - 117)  BP: 132/86 (28 Apr 2020 08:55) (131/75 - 138/74)  BP(mean): --  RR: 22 (28 Apr 2020 08:55) (22 - 22)  SpO2: 90% (28 Apr 2020 08:55) (90% - 92%) I&O's Summary    27 Apr 2020 07:01  -  28 Apr 2020 07:00  --------------------------------------------------------  IN: 580 mL / OUT: 0 mL / NET: 580 mL    28 Apr 2020 07:01  -  28 Apr 2020 14:28  --------------------------------------------------------  IN: 120 mL / OUT: 0 mL / NET: 120 mL      GENERAL: PHYSICAL EXAM DEFERRED DUE TO COVID  [ ]Alert  [ ]Oriented x   [ ]Lethargic  [ ]Cachexia  [ ]Unarousable  [ ]Verbal  [ ]Non-Verbal  Behavioral: PHYSICAL EXAM DEFERRED DUE TO COVID  [ ] Anxiety  [ ] Delirium [ ] Agitation [ ] Other  HEENT:PHYSICAL EXAM DEFERRED DUE TO COVID  [ ]Normal   [ ]Dry mouth   [ ]ET Tube/Trach  [ ]Oral lesions  PULMONARY: PHYSICAL EXAM DEFERRED DUE TO COVID  [ ]Clear [ ]Tachypnea  [ ]Audible excessive secretions   [ ]Rhonchi        [ ]Right [ ]Left [ ]Bilateral  [ ]Crackles        [ ]Right [ ]Left [ ]Bilateral  [ ]Wheezing     [ ]Right [ ]Left [ ]Bilatera  [ ]Diminished breath sounds [ ]right [ ]left [ ]bilateral  CARDIOVASCULAR:  PHYSICAL EXAM DEFERRED DUE TO COVID  [ ]Regular [ ]Irregular [ ]Tachy  [ ]Jackson [ ]Murmur [ ]Other  GASTROINTESTINAL:PHYSICAL EXAM DEFERRED DUE TO COVID  [ ]Soft  [ ]Distended   [ ]+BS  [ ]Non tender [ ]Tender  [ ]PEG [ ]OGT/ NGT  Last BM:     GENITOURINARY:PHYSICAL EXAM DEFERRED DUE TO COVID  [ ]Normal [ ] Incontinent   [ ]Oliguria/Anuria   [ ]Farooq  MUSCULOSKELETAL: PHYSICAL EXAM DEFERRED DUE TO COVID  [ ]Normal   [ ]Weakness  [ ]Bed/Wheelchair bound [ ]Edema  NEUROLOGIC: PHYSICAL EXAM DEFERRED DUE TO COVID  [ ]No focal deficits  [ ]Cognitive impairment  [ ]Dysphagia [ ]Dysarthria [ ]Paresis [ ]Other   SKIN: PHYSICAL EXAM DEFERRED DUE TO COVID  [ ]Normal    [ ]Rash  [ ]Pressure ulcer(s)       Present on admission [ ]y [ ]n    CRITICAL CARE:  [ ] Shock Present  [ ]Septic [ ]Cardiogenic [ ]Neurologic [ ]Hypovolemic  [ ]  Vasopressors [ ]  Inotropes   [x ]Respiratory failure present [ ]Mechanical ventilation [ ]Non-invasive ventilatory support [ ]High flow  [x ]Acute  [ ]Chronic [x ]Hypoxic  [ ]Hypercarbic [ ]Other  [ ]Other organ failure     LABS:                        12.4   6.59  )-----------( 325      ( 27 Apr 2020 06:33 )             40.7   04-27    139  |  100  |  41<H>  ----------------------------<  206<H>  4.9   |  27  |  1.16    Ca    8.7      27 Apr 2020 06:32    TPro  7.0  /  Alb  3.1<L>  /  TBili  0.5  /  DBili  x   /  AST  96<H>  /  ALT  60<H>  /  AlkPhos  77  04-27        RADIOLOGY & ADDITIONAL STUDIES:  < from: Xray Chest 1 View-PORTABLE IMMEDIATE (04.28.20 @ 00:27) >    EXAM:  XR CHEST PORTABLE IMMED 1V                            PROCEDURE DATE:  04/28/2020        INTERPRETATION:  A single chest x-ray was obtained on April 28, 2020.    Indication: Hypoxia.    Impression:    The heart is normal in size. Diffuseairspace opacity is seen throughout both lungs which appears to be worsening when compared to previous study done April 21, 2020.      GANESH DYKES M.D., ATTENDING RADIOLOGIST  This document has been electronically signed. Apr 28 2020  7:10AM     < end of copied text >    x  PROTEIN CALORIE MALNUTRITION PRESENT: [ ]mild [ ]moderate [ ]severe [ ]underweight [ ]morbid obesity  https://www.andeal.org/vault/2440/web/files/ONC/Table_Clinical%20Characteristics%20to%20Document%20Malnutrition-White%20JV%20et%20al%250002.pdf    Height (cm): 157.48 (04-21-20 @ 01:44), 157.48 (04-18-20 @ 23:15), 154.94 (05-21-19 @ 11:48)  Weight (kg): 82.1 (04-21-20 @ 11:57), 77.1 (04-18-20 @ 23:15), 80.4 (05-21-19 @ 11:48)  BMI (kg/m2): 33.1 (04-21-20 @ 11:57), 31.1 (04-21-20 @ 01:44), 31.1 (04-18-20 @ 23:15)    [x ]PPSV2 < or = to 30% [ ]significant weight loss  [x ]poor nutritional intake  [ ]anasarca     Albumin, Serum: 3.1 g/dL (04-27-20 @ 06:32)   [ ]Artificial Nutrition      REFERRALS:   [ ]Chaplaincy  [ ]Hospice  [ ]Child Life  [ ]Social Work  [ x]Case management [ ]Holistic Therapy     Goals of Care Document: KRISHNA Gale (04-28-20 @ 12:42)  Goals of Care Conversation:   Participants:  · Child(steffany)  Dale León (sons)  · Relative  Link (cousin)    Advance Directives:  · Does patient have Advance Directive  No    Conversation Discussion:  · Conversation  Prognosis; MOLST Discussed  · Conversation Details  Spoke to Dale León and Link on 3-way call during RRT in which pt was hypoxic to 70s w/ limited improvement w/ proning/chest PT. Explained current prognosis, discussed poor likelihood of extubation w/ MV. Family opted to make pt DNR/DNI. Would like to consider morphine/dilaudid for comfort; however, ask to delay until after family arrives.    Personal Advance Directives Treatment Guidelines:   Treatment Guidelines:  · Treatment Guidelines  DNR Order    MOLST:  · Completed  28-Apr-2020  · Updated  28-Apr-2020      Electronic Signatures:  Mary Gale)  (Signed 28-Apr-2020 12:45)  	Authored: Goals of Care Conversation, Personal Advance Directives Treatment Guidelines      Last Updated: 28-Apr-2020 12:45 by Mary Gale)

## 2020-04-28 NOTE — PROGRESS NOTE ADULT - ASSESSMENT
84M [place]yr[place] speaking, DM2, HLD, known COVID-19 positive with recent ED visit, brought by EMS from home for blood culture growing gram positive rods consistent with 2 different species of cornyebacterium in 2/4 bottles and AMS. AMS improving since admission. Course c/b hypoxia requiring NRB since 4/23, s/p Plaquenil (initiated as outpatient) s/p toco 4/24, s/p plasma trial 4/25, on abx for possible UTI vs bacteruria, repeat blood cx with NGTD.    #COVID-19 infection, Viral Pneumonia 2/2 Novel Coronavirus Infection (COVID-19): S/p Plaquenil (completed 4/22), s/p dose of tocizulmab 4/24  --On NRB since 4/23. Overnight 4/27-4/28 with RRT for hypoxia to 70s, improved with lasix 40mg x 1, proning and giving one dose of methyprednisolone. CXR with worsening infiltrates.   --Closely monitor respiratory status and escalate O2 therapy as necessary to maintain SpO2 92-96%; currently 100% NRB -> intubation (avoiding BiPAP/CPAP given risk of aerosolizing virus). Use continuous oximetry monitoring if/when on NRB.  --Consider prone positioning; however, suspect that patient will be unlikely to tolerate this position, especially while in restraints  --c/w daily lovenox given increased hypercoagulable state in COVID-19 patients (BMI > 30 and CrCl > 15ml/min: Lovenox 40mg SQ BID)  --Monitor daily (or as needed): CBC, CMP, Mg, Phos, CPK. Monitor inflammatory markers [id number] to monitor disease progression (procalcitonin, CRP, ESR, ferritin, coags, D-dimer, LDH)   --Limit IVF given risk of ARDS  --Appreciate pulmonology input    #Bacteremia, bacteruria: GNR bacteremia in 2 of 4 cultures from 4/19 consistent with two different corynebacterium. S/p course of azithromycin prior to positive culture result  --UCX concerning for enterococcus though less than 99,000, started on ampicillin for 5 day course (4/24-4/30)  --Follow up repeat cultures (sent on 4/21, 4/22)-- thus far NGTD    #Encephalopathy/Altered mental status at admission: Likely 2/2 infection. CT head without acute changes  --Frequent orientation  --Restraints as needed to prevent removal of supplemental O2  --Per prior discussion with neurology, could be hospital delirium vs encephalopathy seen with COVID-19    #Type 2DM: A1c 8.4  --Continue to hold home meds for now  --Goal FSBG 100-180   --low dose ISS for now  --Lantus 6U daily   --If consistent PO intake and if FSBG >250 x2 add 2 units lispro before meals   --Diabetic diet     #HTN: Not on home meds for HTN  --Continue to monitor, goal <130/80 in DM   --If remains elevated, can start low dose Norvasc as needed     #Constipation: Patient unable to state clearly whether he has had BM and this AM nurse was unsure  --Currently only on senna QHS, but has received one time doses of lactulose, suppository, etc  --Will adjust as needed    #Elevated liver tests: Suspect 2/2 COVID-19  --Monitor for now  --No indication for imaging at this time    #DVT Prophylaxis  --Given increased hypercoagulable state in COVID-19 patients, c/w Lovenox 40 BID: BMI > 30 and CrCl > 15ml/min:  --Will consider need for extended prophylaxis prior to discharge based on D-Dimer and calculated VTE risk.    #DC planning/care coordination  --FULL CODE per prior discussions with patient's sons  --Contact: Son[facility] [phone] (updated daily, most recently 4/27)  --Eventual plan for discharge home with proper post-hospitalization instructions, including self-isolation at home, close monitoring of symptoms, etc.   --PT eval/case management     Above care plan discussed with: 7 Duane STERLING, _______    Camryn Maher MD  Attending, Division of Gastroenterology 84M Surinamese speaking, DM2, HLD, known COVID-19 positive with recent ED visit, brought by EMS from home for blood culture growing gram positive rods consistent with 2 different species of cornyebacterium in 2/4 bottles and AMS. AMS improving since admission. Course c/b hypoxia requiring NRB since 4/23, s/p Plaquenil (initiated as outpatient) s/p toco 4/24, s/p plasma trial 4/25, on abx for possible UTI vs bacteruria, repeat blood cx with NGTD.    #COVID-19 infection, Viral Pneumonia 2/2 Novel Coronavirus Infection (COVID-19): S/p Plaquenil (completed 4/22), s/p dose of tocizulmab 4/24  --Will discuss re: plasma trial  --On NRB since 4/23. Overnight 4/27-4/28 with RRT for hypoxia to 70s, improved with lasix 40mg x 1, proning and giving one dose of methyprednisolone. CXR with worsening infiltrates.   --Closely monitor respiratory status and escalate O2 therapy as necessary to maintain SpO2 92-96%; currently 100% NRB -> intubation (avoiding BiPAP/CPAP given risk of aerosolizing virus). Use continuous oximetry monitoring if/when on NRB.  --Consider prone positioning; however, suspect that patient will be unlikely to tolerate this position, especially while in restraints  --c/w daily lovenox given increased hypercoagulable state in COVID-19 patients (BMI > 30 and CrCl > 15ml/min: Lovenox 40mg SQ BID)  --Monitor daily (or as needed): CBC, CMP, Mg, Phos, CPK. Monitor inflammatory markers [id number] to monitor disease progression (procalcitonin, CRP, ESR, ferritin, coags, D-dimer, LDH)   --Limit IVF given risk of ARDS  --Appreciate pulmonology input    #Bacteremia, bacteruria: GNR bacteremia in 2 of 4 cultures from 4/19 consistent with two different corynebacterium. S/p course of azithromycin prior to positive culture result  --UCX concerning for enterococcus though less than 99,000, started on ampicillin for 5 day course (4/24-4/30)  --Follow up repeat cultures (sent on 4/21, 4/22)-- thus far NGTD    #Encephalopathy/Altered mental status at admission: Likely 2/2 infection. CT head without acute changes  --Frequent orientation  --Restraints as needed to prevent removal of supplemental O2  --Per prior discussion with neurology, could be hospital delirium vs encephalopathy seen with COVID-19    #Type 2DM: A1c 8.4  --Continue to hold home meds for now  --Goal FSBG 100-180   --low dose ISS for now  --Lantus 6U daily   --If consistent PO intake and if FSBG >250 x2 add 2 units lispro before meals   --Diabetic diet     #HTN: Not on home meds for HTN  --Continue to monitor, goal <130/80 in DM   --If remains elevated, can start low dose Norvasc as needed     #Constipation: Patient unable to state clearly whether he has had BM and this AM nurse was unsure  --Currently only on senna QHS, but has received one time doses of lactulose, suppository, etc  --Will adjust as needed    #Elevated liver tests: Suspect 2/2 COVID-19  --Monitor for now  --No indication for imaging at this time    #DVT Prophylaxis  --Given increased hypercoagulable state in COVID-19 patients, c/w Lovenox 40 BID: BMI > 30 and CrCl > 15ml/min:  --Will consider need for extended prophylaxis prior to discharge based on D-Dimer and calculated VTE risk.    #DC planning/care coordination  --FULL CODE per prior discussions with patient's sons  --Contact: [facility] [phone] (updated daily, most recently 4/27)  --Eventual plan for discharge home with proper post-hospitalization instructions, including self-isolation at home, close monitoring of symptoms, etc.   --PT eval/case management     Above care plan discussed with: 7 Duane STERLING, _______    Camryn Maher MD  Attending, Division of Gastroenterology 84M Guyanese speaking, DM2, HLD, known COVID-19 positive with recent ED visit, brought by EMS from home for blood culture growing gram positive rods consistent with 2 different species of cornyebacterium in 2/4 bottles and AMS. AMS improving since admission. Course c/b hypoxia requiring NRB since 4/23, s/p Plaquenil (initiated as outpatient) s/p toco 4/24, s/p plasma trial 4/25, on abx for possible UTI vs bacteruria, repeat blood cx with NGTD.    #COVID-19 infection, Viral Pneumonia 2/2 Novel Coronavirus Infection (COVID-19): S/p Plaquenil (completed 4/22), s/p dose of tocizulmab 4/24; s/p Plasma trial (4/25)  --On NRB since 4/23. Overnight 4/27-4/28 with RRT for hypoxia to 70s, improved with lasix 40mg x 1, proning and giving one dose of methyprednisolone. CXR with worsening infiltrates.   --Closely monitor respiratory status and escalate O2 therapy as necessary to maintain SpO2 92-96%; currently 100% NRB -> intubation (avoiding BiPAP/CPAP given risk of aerosolizing virus). Use continuous oximetry monitoring if/when on NRB.  --Consider prone positioning; however, suspect that patient will be unlikely to tolerate this position, especially while in restraints  --c/w daily lovenox given increased hypercoagulable state in COVID-19 patients (BMI > 30 and CrCl > 15ml/min: Lovenox 40mg SQ BID)  --Monitor daily (or as needed): CBC, CMP, Mg, Phos, CPK. Monitor inflammatory markers [id number] to monitor disease progression (procalcitonin, CRP, ESR, ferritin, coags, D-dimer, LDH)   --Limit IVF given risk of ARDS  --Appreciate pulmonology input    #Bacteremia, bacteruria: GNR bacteremia in 2 of 4 cultures from 4/19 consistent with two different corynebacterium. S/p course of azithromycin prior to positive culture result  --UCX concerning for enterococcus though less than 99,000, started on ampicillin for 5 day course (4/24-4/30)  --Follow up repeat cultures (sent on 4/21, 4/22)-- thus far NGTD    #Encephalopathy/Altered mental status at admission: Likely 2/2 infection. CT head without acute changes  --Frequent orientation  --Restraints as needed to prevent removal of supplemental O2  --Per prior discussion with neurology, could be hospital delirium vs encephalopathy seen with COVID-19    #Type 2DM: A1c 8.4  --Continue to hold home meds for now  --Goal FSBG 100-180   --low dose ISS for now  --Lantus 6U daily   --If consistent PO intake and if FSBG >250 x2 add 2 units lispro before meals   --Diabetic diet     #HTN: Not on home meds for HTN  --Continue to monitor, goal <130/80 in DM   --If remains elevated, can start low dose Norvasc as needed     #Constipation: Patient unable to state clearly whether he has had BM and this AM nurse was unsure  --Currently only on senna QHS, but has received one time doses of lactulose, suppository, etc  --Will adjust as needed    #Elevated liver tests: Suspect 2/2 COVID-19  --Monitor for now  --No indication for imaging at this time    #DVT Prophylaxis  --Given increased hypercoagulable state in COVID-19 patients, c/w Lovenox 40 BID: BMI > 30 and CrCl > 15ml/min:  --Will consider need for extended prophylaxis prior to discharge based on D-Dimer and calculated VTE risk.    #DC planning/care coordination  --FULL CODE per prior discussions with patient's sons  --Contact: [facility] [phone] (updated daily, most recently 4/27)  --Eventual plan for discharge home with proper post-hospitalization instructions, including self-isolation at home, close monitoring of symptoms, etc.   --PT eval/case management     Above care plan discussed with: 7 Duane STERLING, _______    Camryn Maher MD  Attending, Division of Gastroenterology 84M Croatian speaking, DM2, HLD, known COVID-19 positive with recent ED visit, brought by EMS from home for blood culture growing gram positive rods consistent with 2 different species of cornyebacterium in 2/4 bottles and AMS. AMS improving since admission. Course c/b hypoxia requiring NRB since 4/23, s/p Plaquenil (initiated as outpatient) s/p toco 4/24, s/p plasma trial 4/25, on abx for possible UTI vs bacteruria, repeat blood cx with NGTD.    #COVID-19 infection, Viral Pneumonia 2/2 Novel Coronavirus Infection (COVID-19): S/p Plaquenil (completed 4/22), s/p dose of tocizulmab 4/24; s/p Plasma trial (4/25)  --On NRB since 4/23. Overnight 4/27-4/28 with RRT for hypoxia to 70s, improved with lasix 40mg x 1, proning and giving one dose of methyprednisolone. CXR with worsening infiltrates.   --If recurrent desaturation despite proning, may require intubation.   --Closely monitor respiratory status and escalate O2 therapy as necessary to maintain SpO2 92-96%; currently 100% NRB -> intubation (avoiding BiPAP/CPAP given risk of aerosolizing virus). Use continuous oximetry monitoring if/when on NRB.  --Consider prone positioning; however, suspect that patient will be unlikely to tolerate this position, especially while in restraints  --c/w daily lovenox given increased hypercoagulable state in COVID-19 patients (BMI > 30 and CrCl > 15ml/min: Lovenox 40mg SQ BID)  --Monitor daily (or as needed): CBC, CMP, Mg, Phos, CPK. Monitor inflammatory markers [id number] to monitor disease progression (procalcitonin, CRP, ESR, ferritin, coags, D-dimer, LDH)   --Limit IVF given risk of ARDS  --Will continue to readdress GOC, but remains FULL CODE at this time  --Appreciate pulmonology input    #Bacteremia, bacteruria: GNR bacteremia in 2 of 4 cultures from 4/19 consistent with two different corynebacterium. S/p course of azithromycin prior to positive culture result  --UCX concerning for enterococcus though less than 99,000, started on ampicillin for 5 day course (4/24-4/30)  --Follow up repeat cultures (sent on 4/21, 4/22)-- thus far NGTD    #Encephalopathy/Altered mental status at admission: Likely 2/2 infection. CT head without acute changes  --Frequent orientation  --Restraints as needed to prevent removal of supplemental O2  --Per prior discussion with neurology, likely hospital delirium vs encephalopathy seen with COVID-19    #Type 2DM: A1c 8.4  --Continue to hold home meds for now  --Goal FSBG 100-180   --Low dose ISS for now  --Lantus 6U daily   --If consistent PO intake and if FSBG >250 x2 add 2 units lispro before meals   --Diabetic diet     #HTN: Not on home meds for HTN  --Continue to monitor, goal <130/80 in DM   --If remains elevated, can start low dose Norvasc as needed     #Constipation: Patient unable to state clearly whether he has had BM and this AM nurse was unsure  --Currently only on senna QHS, but has received one time doses of lactulose, suppository, etc  --Will adjust as needed    #Elevated liver tests: Suspect 2/2 COVID-19  --Monitor for now  --No indication for imaging at this time    #DVT Prophylaxis  --Given increased hypercoagulable state in COVID-19 patients, c/w Lovenox 40 BID: BMI > 30 and CrCl > 15ml/min  --Will consider need for extended prophylaxis prior to discharge based on D-Dimer and calculated VTE risk.    #DC planning/care coordination  --FULL CODE per prior discussions with patient's sons  --Contact: [facility] [phone] (updated daily, most recently 4/27, will call again today)  --Eventual plan for discharge with proper post-hospitalization instructions, including self-isolation at home, close monitoring of symptoms, etc.   --PT eval/case management     Above care plan discussed with: Alexandra Zepeda ACP, Shahnaz Maher MD  Attending, Division of Gastroenterology 84M [place]ian speaking, DM2, HLD, known COVID-19 positive with recent ED visit, brought by EMS from home for blood culture growing gram positive rods consistent with 2 different species of cornyebacterium in 2/4 bottles and AMS. AMS improving since admission. Course c/b hypoxia requiring NRB since 4/23, s/p Plaquenil (initiated as outpatient) s/p toco 4/24, s/p plasma trial 4/25, on abx for possible UTI vs bacteruria, repeat blood cx with NGTD.    #COVID-19 infection, Viral Pneumonia 2/2 Novel Coronavirus Infection (COVID-19): S/p Plaquenil (completed 4/22), s/p dose of tocizulmab 4/24; s/p Plasma trial (4/25)  --On NRB since 4/23. Overnight 4/27-4/28 with RRT for hypoxia to 70s, improved with lasix 40mg x 1, proning and giving one dose of methyprednisolone. CXR with worsening infiltrates.   --If recurrent desaturation despite proning, may require intubation.   --Closely monitor respiratory status and escalate O2 therapy as necessary to maintain SpO2 92-96%; currently 100% NRB -> intubation (avoiding BiPAP/CPAP given risk of aerosolizing virus). Use continuous oximetry monitoring if/when on NRB.  --Consider prone positioning; however, suspect that patient will be unlikely to tolerate this position, especially while in restraints  --c/w daily lovenox given increased hypercoagulable state in COVID-19 patients (BMI > 30 and CrCl > 15ml/min: Lovenox 40mg SQ BID)  --Monitor daily (or as needed): CBC, CMP, Mg, Phos, CPK. Monitor inflammatory markers [id number] to monitor disease progression (procalcitonin, CRP, ESR, ferritin, coags, D-dimer, LDH)   --Limit IVF given risk of ARDS  --Will continue to readdress GOC, but remains FULL CODE at this time  --Appreciate pulmonology input    #Bacteremia, bacteruria: GNR bacteremia in 2 of 4 cultures from 4/19 consistent with two different corynebacterium. S/p course of azithromycin prior to positive culture result  --UCX concerning for enterococcus though less than 99,000, started on ampicillin for 5 day course (4/24-4/30)  --Follow up repeat cultures (sent on 4/21, 4/22)-- thus far NGTD    #Encephalopathy/Altered mental status at admission: Likely 2/2 infection. CT head without acute changes  --Frequent orientation  --Restraints as needed to prevent removal of supplemental O2  --Per prior discussion with neurology, likely hospital delirium vs encephalopathy seen with COVID-19    #Type 2DM: A1c 8.4  --Continue to hold home meds for now  --Goal FSBG 100-180   --Low dose ISS for now  --Lantus 6U daily   --If consistent PO intake and if FSBG >250 x2 add 2 units lispro before meals   --Diabetic diet     #HTN: Not on home meds for HTN  --Continue to monitor, goal <130/80 in DM   --If remains elevated, can start low dose Norvasc as needed     #Constipation: Patient unable to state clearly whether he has had BM and this AM nurse was unsure  --Currently only on senna QHS, but has received one time doses of lactulose, suppository, etc  --Will adjust as needed    #Elevated liver tests: Suspect 2/2 COVID-19  --Monitor for now  --No indication for imaging at this time    #DVT Prophylaxis  --Given increased hypercoagulable state in COVID-19 patients, c/w Lovenox 40 BID: BMI > 30 and CrCl > 15ml/min  --Will consider need for extended prophylaxis prior to discharge based on D-Dimer and calculated VTE risk.    #DC planning/care coordination  --FULL CODE per prior discussions with patient's sons  --Contact: [facility] [phone] (updated daily, most recently 4/28)  --Eventual plan for discharge with proper post-hospitalization instructions, including self-isolation at home, close monitoring of symptoms, etc.   --PT eval/case management     Above care plan discussed with: 7 Duane STERLING, Shahnaz Maher MD  Attending, Division of Gastroenterology 84M [place]ak speaking, DM2, HLD, known COVID-19 positive with recent ED visit, brought by EMS from home for blood culture growing gram positive rods consistent with 2 different species of cornyebacterium in 2/4 bottles and AMS. AMS improving since admission. Course c/b hypoxia requiring NRB since 4/23, s/p Plaquenil (initiated as outpatient) s/p toco 4/24, s/p plasma trial 4/25, on abx for possible UTI vs bacteruria, repeat blood cx with NGTD.    #COVID-19 infection, Viral Pneumonia 2/2 Novel Coronavirus Infection (COVID-19): S/p Plaquenil (completed 4/22), s/p dose of tocizulmab 4/24; s/p Plasma trial (4/25)  --On NRB since 4/23. Overnight 4/27-4/28 with RRT for hypoxia to 70s, improved with lasix 40mg x 1, proning and giving one dose of methyprednisolone. CXR with worsening infiltrates.   --If recurrent desaturation despite proning, may require intubation.   --Closely monitor respiratory status and escalate O2 therapy as necessary to maintain SpO2 92-96%; currently 100% NRB -> intubation (avoiding BiPAP/CPAP given risk of aerosolizing virus). Use continuous oximetry monitoring if/when on NRB.  --Consider prone positioning; however, suspect that patient will be unlikely to tolerate this position, especially while in restraints  --c/w daily lovenox given increased hypercoagulable state in COVID-19 patients (BMI > 30 and CrCl > 15ml/min: Lovenox 40mg SQ BID)  --Monitor daily (or as needed): CBC, CMP, Mg, Phos, CPK. Monitor inflammatory markers [id number] to monitor disease progression (procalcitonin, CRP, ESR, ferritin, coags, D-dimer, LDH)   --Limit IVF given risk of ARDS  --Will continue to readdress GOC, but remains FULL CODE at this time  --Appreciate pulmonology input    #Bacteremia, bacteruria: GNR bacteremia in 2 of 4 cultures from 4/19 consistent with two different corynebacterium. S/p course of azithromycin prior to positive culture result  --UCX concerning for enterococcus though less than 99,000, started on ampicillin for 5 day course (4/24-4/30)  --Follow up repeat cultures (sent on 4/21, 4/22)-- thus far NGTD    #Encephalopathy/Altered mental status at admission: Likely 2/2 infection. CT head without acute changes  --Frequent orientation  --Restraints as needed to prevent removal of supplemental O2  --Per prior discussion with neurology, likely hospital delirium vs encephalopathy seen with COVID-19    #Type 2DM: A1c 8.4  --Continue to hold home meds for now  --Goal FSBG 100-180   --Low dose ISS for now  --Lantus 6U daily   --If consistent PO intake and if FSBG >250 x2 add 2 units lispro before meals   --Diabetic diet     #HTN: Not on home meds for HTN  --Continue to monitor, goal <130/80 in DM   --If remains elevated, can start low dose Norvasc as needed     #Constipation: Patient unable to state clearly whether he has had BM and this AM nurse was unsure  --Currently only on senna QHS, but has received one time doses of lactulose, suppository, etc  --Will adjust as needed    #Elevated liver tests: Suspect 2/2 COVID-19  --Monitor for now  --No indication for imaging at this time    #DVT Prophylaxis  --Given increased hypercoagulable state in COVID-19 patients, c/w Lovenox 40 BID: BMI > 30 and CrCl > 15ml/min  --Will consider need for extended prophylaxis prior to discharge based on D-Dimer and calculated VTE risk.    #DC planning/care coordination  --Spoke with   --FULL CODE per prior discussions with patient's sons  --Contact: [facility] [phone] (updated daily, most recently 4/28)  --Eventual plan for discharge with proper post-hospitalization instructions, including self-isolation at home, close monitoring of symptoms, etc.   --PT eval/case management     Above care plan discussed with: 7 Duane STERLING, Shahnaz Maher MD  Attending, Division of Gastroenterology 84M [place] citizen speaking, DM2, HLD, known COVID-19 positive with recent ED visit, brought by EMS from home for blood culture growing gram positive rods consistent with 2 different species of cornyebacterium in 2/4 bottles and AMS. AMS improving since admission. Course c/b hypoxia requiring NRB since 4/23, s/p Plaquenil (initiated as outpatient) s/p toco 4/24, s/p plasma trial 4/25, on abx for possible UTI vs bacteruria, repeat blood cx with NGTD.    #COVID-19 infection, Viral Pneumonia 2/2 Novel Coronavirus Infection (COVID-19): S/p Plaquenil (completed 4/22), s/p dose of tocizulmab 4/24; s/p Plasma trial (4/25)  --On NRB since 4/23. Overnight 4/27-4/28 with RRT for hypoxia to 70s, improved with lasix 40mg x 1, proning and giving one dose of methyprednisolone. CXR with worsening infiltrates.   --If recurrent desaturation despite proning, may require intubation.   --Closely monitor respiratory status and escalate O2 therapy as necessary to maintain SpO2 92-96%; currently 100% NRB -> intubation (avoiding BiPAP/CPAP given risk of aerosolizing virus). Use continuous oximetry monitoring if/when on NRB.  --Consider prone positioning; however, suspect that patient will be unlikely to tolerate this position, especially while in restraints  --c/w daily lovenox given increased hypercoagulable state in COVID-19 patients (BMI > 30 and CrCl > 15ml/min: Lovenox 40mg SQ BID)  --Monitor daily (or as needed): CBC, CMP, Mg, Phos, CPK. Monitor inflammatory markers [id number] to monitor disease progression (procalcitonin, CRP, ESR, ferritin, coags, D-dimer, LDH)   --Limit IVF given risk of ARDS  --Will continue to readdress GOC, but remains FULL CODE at this time  --Appreciate pulmonology input    #Bacteremia, bacteruria: GNR bacteremia in 2 of 4 cultures from 4/19 consistent with two different corynebacterium. S/p course of azithromycin prior to positive culture result  --UCX concerning for enterococcus though less than 99,000, started on ampicillin for 5 day course (4/24-4/30)  --Follow up repeat cultures (sent on 4/21, 4/22)-- thus far NGTD    #Encephalopathy/Altered mental status at admission: Likely 2/2 infection. CT head without acute changes  --Frequent orientation  --Restraints as needed to prevent removal of supplemental O2  --Per prior discussion with neurology, likely hospital delirium vs encephalopathy seen with COVID-19    #Type 2DM: A1c 8.4  --Continue to hold home meds for now  --Goal FSBG 100-180   --Low dose ISS for now  --Lantus 6U daily   --If consistent PO intake and if FSBG >250 x2 add 2 units lispro before meals   --Diabetic diet     #HTN: Not on home meds for HTN  --Continue to monitor, goal <130/80 in DM   --If remains elevated, can start low dose Norvasc as needed     #Constipation: Patient unable to state clearly whether he has had BM and this AM nurse was unsure  --Currently only on senna QHS, but has received one time doses of lactulose, suppository, etc  --Will adjust as needed    #Elevated liver tests: Suspect 2/2 COVID-19  --Monitor for now  --No indication for imaging at this time    #DVT Prophylaxis  --Given increased hypercoagulable state in COVID-19 patients, c/w Lovenox 40 BID: BMI > 30 and CrCl > 15ml/min  --Will consider need for extended prophylaxis prior to discharge based on D-Dimer and calculated VTE risk.    #DC planning/care coordination  --Spoke with patient's sons, Dale and Mau for > 30 minutes this morning with update regarding patient's status and overnight RRT. Both brothers believe that patient will improve if he has more frequent checks by nursing; explained that given patient's age, comorbidities, and severe PNA 2/2 COVID, the therapy is supportive care with O2 and proning, which he is receiving. Tried to have a discussion regarding code status, but they continued to repeat "just save him and do everything." They hope that things get better (though I addressed poor prognosis). They were both somewhat aggressive over the phone. For now, patient remains full code.   --FULL CODE  --Contact: SonDale [phone] (updated daily, most recently 4/28); Mau (son) [phone]  --Eventual plan for discharge with proper post-hospitalization instructions, including self-isolation at home, close monitoring of symptoms, etc.   --PT eval/case management once clinically stable    Above care plan discussed with: [address] Duane STERLING, Shahnaz Maher MD  Attending, Division of Gastroenterology

## 2020-04-28 NOTE — CHART NOTE - NSCHARTNOTEFT_GEN_A_CORE
Night Medicine NP  s/p RRT     85 y/o M with pmhx of DM2, HLD, and known COVID-19 positive with recent ED visit, brought in by EMS from home for blood culture growing gram positive rods and AMS. RRT called for hypoxia of 82% on 15L NRB. Pt given Solumedrol, lasix, IV Tylenol and placed in prone position. Pt reassessed and noted to be 93% on NRB and appearing in no distress. Hospitalist notified.      Felicita Allan [facility]  Dept of Medicine  [id number]

## 2020-04-28 NOTE — CONSULT NOTE ADULT - PROBLEM SELECTOR RECOMMENDATION 9
Goal RR <24 and non labored  Recommend dilaudid 0.2mg q1h prn for respiratory symptoms  ativan 0.2mg q2h prn for anxiety/agitation/refractory dyspnea  supplemental oxygen  DNI

## 2020-04-28 NOTE — CONSULT NOTE ADULT - PROBLEM SELECTOR RECOMMENDATION 3
DNR/DNI confirmed  family requests ongoing medical management but wants to avoid life support machines and are amenable to providing symptom management

## 2020-04-28 NOTE — PROGRESS NOTE ADULT - SUBJECTIVE AND OBJECTIVE BOX
**PRELIM NOTE** **PRELIM NOTE**                < from: Xray Chest 1 View-PORTABLE IMMEDIATE (04.28.20 @ 00:27) >  PROCEDURE DATE:  04/28/2020    INTERPRETATION:  A single chest x-ray was obtained on April 28, 2020.  Indication: Hypoxia.  Impression:  The heart is normal in size. Diffuseairspace opacity is seen throughout both lungs which appears to be worsening when compared to previous study done April 21, 2020.  < end of copied text > **PRELIM NOTE**                    Division of Hospital Medicine Progress Note    Patient is a 84y old  Male who presents with a chief complaint of positive blood culture (28 Apr 2020 07:33)    SUBJECTIVE / OVERNIGHT EVENTS: Overnight, patient with RRT for hypoxia with O2 sat 70s. Patient was placed in prone position and repeat CXR notable for increased bilateral opacities. IVF discontinued (standing IVF had been ordered by prior overnight provider?) and patient given dose of lasix 40mg as well as methylprednisolone. O2 sat improved to 90%.     ADDITIONAL REVIEW OF SYSTEMS: Negative, except as noted above    MEDICATIONS  (STANDING):  ALBUTerol    90 MICROgram(s) HFA Inhaler 2 Puff(s) Inhalation every 6 hours  ampicillin  IVPB 1 Gram(s) IV Intermittent every 8 hours  ascorbic acid 500 milliGRAM(s) Oral daily  aspirin  chewable 81 milliGRAM(s) Oral daily  dextrose 5%. 1000 milliLiter(s) (50 mL/Hr) IV Continuous <Continuous>  dextrose 50% Injectable 12.5 Gram(s) IV Push once  dextrose 50% Injectable 25 Gram(s) IV Push once  dextrose 50% Injectable 25 Gram(s) IV Push once  enoxaparin Injectable 40 milliGRAM(s) SubCutaneous two times a day  famotidine    Tablet 40 milliGRAM(s) Oral two times a day  folic acid 1 milliGRAM(s) Oral daily  insulin glargine Injectable (LANTUS) 6 Unit(s) SubCutaneous every morning  insulin lispro (HumaLOG) corrective regimen sliding scale   SubCutaneous three times a day before meals  senna 2 Tablet(s) Oral at bedtime  zinc sulfate 220 milliGRAM(s) Oral daily    MEDICATIONS  (PRN):  acetaminophen   Tablet .. 650 milliGRAM(s) Oral every 4 hours PRN Temp greater or equal to 38.5C (101.3F)  benzonatate 100 milliGRAM(s) Oral three times a day PRN Cough  dextrose 40% Gel 15 Gram(s) Oral once PRN Blood Glucose LESS THAN 70 milliGRAM(s)/deciliter  glucagon  Injectable 1 milliGRAM(s) IntraMuscular once PRN Glucose LESS THAN 70 milligrams/deciliter  nystatin Powder 1 Application(s) Topical three times a day PRN rash MAD  sodium chloride 0.65% Nasal 1 Spray(s) Both Nostrils once PRN Nasal Congestion      CAPILLARY BLOOD GLUCOSE      POCT Blood Glucose.: 313 mg/dL (28 Apr 2020 07:49)  POCT Blood Glucose.: 209 mg/dL (27 Apr 2020 23:55)  POCT Blood Glucose.: 211 mg/dL (27 Apr 2020 17:27)  POCT Blood Glucose.: 215 mg/dL (27 Apr 2020 12:47)  POCT Blood Glucose.: 248 mg/dL (27 Apr 2020 10:47)    I&O's Summary    27 Apr 2020 07:01  -  28 Apr 2020 07:00  --------------------------------------------------------  IN: 580 mL / OUT: 0 mL / NET: 580 mL        PHYSICAL EXAM:  Vital Signs Last 24 Hrs  T(C): 37 (28 Apr 2020 05:06), Max: 37 (28 Apr 2020 05:06)  T(F): 98.6 (28 Apr 2020 05:06), Max: 98.6 (28 Apr 2020 05:06)  HR: 108 (28 Apr 2020 05:06) (108 - 112)  BP: 138/74 (28 Apr 2020 05:06) (131/75 - 138/74)  BP(mean): --  RR: 22 (28 Apr 2020 05:06) (22 - 22)  SpO2: 90% (28 Apr 2020 05:06) (90% - 94%)    CONSTITUTIONAL: NAD, well-developed  HEENT: MMM, anicteric  RESPIRATORY: Normal respiratory effort  CARDIOVASCULAR: Regular rhythm  ABDOMEN: Nontender to palpation  EXTREMITIES: No LE edema  PSYCH: affect appropriate; AAOx3  NEUROLOGY: no focal deficits  SKIN: No rashes on exposed skin    LABS:                        12.4   6.59  )-----------( 325      ( 27 Apr 2020 06:33 )             40.7     04-27    139  |  100  |  41<H>  ----------------------------<  206<H>  4.9   |  27  |  1.16    Ca    8.7      27 Apr 2020 06:32    TPro  7.0  /  Alb  3.1<L>  /  TBili  0.5  /  DBili  x   /  AST  96<H>  /  ALT  60<H>  /  AlkPhos  77  04-27              COVID-19 PCR: Detected (04-19-20 @ 00:58)      RECENT PERTINENT RADIOLOGY & ADDITIONAL TESTS:  < from: Xray Chest 1 View-PORTABLE IMMEDIATE (04.28.20 @ 00:27) >  PROCEDURE DATE:  04/28/2020    INTERPRETATION:  A single chest x-ray was obtained on April 28, 2020.  Indication: Hypoxia.  Impression:  The heart is normal in size. Diffuse airspace opacity is seen throughout both lungs which appears to be worsening when compared to previous study done April 21, 2020.  < end of copied text > Division of Hospital Medicine Progress Note    Patient is a 84y old  Male who presents with a chief complaint of positive blood culture (28 Apr 2020 07:33)    SUBJECTIVE / OVERNIGHT EVENTS: Overnight, patient with RRT for hypoxia with O2 sat 70s. Patient was placed in prone position and repeat CXR notable for increased bilateral opacities. IVF discontinued (standing IVF had been ordered by prior overnight provider?) and patient given dose of lasix 40mg as well as methylprednisolone. O2 sat improved to 90%.   Unable to obtain history from patient as he is in prone position on NRB with increased work of breathing and therefore unable to answer bedside phone.     ADDITIONAL REVIEW OF SYSTEMS: Negative, except as noted above    MEDICATIONS  (STANDING):  ALBUTerol    90 MICROgram(s) HFA Inhaler 2 Puff(s) Inhalation every 6 hours  ampicillin  IVPB 1 Gram(s) IV Intermittent every 8 hours  ascorbic acid 500 milliGRAM(s) Oral daily  aspirin  chewable 81 milliGRAM(s) Oral daily  dextrose 5%. 1000 milliLiter(s) (50 mL/Hr) IV Continuous <Continuous>  dextrose 50% Injectable 12.5 Gram(s) IV Push once  dextrose 50% Injectable 25 Gram(s) IV Push once  dextrose 50% Injectable 25 Gram(s) IV Push once  enoxaparin Injectable 40 milliGRAM(s) SubCutaneous two times a day  famotidine    Tablet 40 milliGRAM(s) Oral two times a day  folic acid 1 milliGRAM(s) Oral daily  insulin glargine Injectable (LANTUS) 6 Unit(s) SubCutaneous every morning  insulin lispro (HumaLOG) corrective regimen sliding scale   SubCutaneous three times a day before meals  senna 2 Tablet(s) Oral at bedtime  zinc sulfate 220 milliGRAM(s) Oral daily    MEDICATIONS  (PRN):  acetaminophen   Tablet .. 650 milliGRAM(s) Oral every 4 hours PRN Temp greater or equal to 38.5C (101.3F)  benzonatate 100 milliGRAM(s) Oral three times a day PRN Cough  dextrose 40% Gel 15 Gram(s) Oral once PRN Blood Glucose LESS THAN 70 milliGRAM(s)/deciliter  glucagon  Injectable 1 milliGRAM(s) IntraMuscular once PRN Glucose LESS THAN 70 milligrams/deciliter  nystatin Powder 1 Application(s) Topical three times a day PRN rash MAD  sodium chloride 0.65% Nasal 1 Spray(s) Both Nostrils once PRN Nasal Congestion      CAPILLARY BLOOD GLUCOSE      POCT Blood Glucose.: 313 mg/dL (28 Apr 2020 07:49)  POCT Blood Glucose.: 209 mg/dL (27 Apr 2020 23:55)  POCT Blood Glucose.: 211 mg/dL (27 Apr 2020 17:27)  POCT Blood Glucose.: 215 mg/dL (27 Apr 2020 12:47)  POCT Blood Glucose.: 248 mg/dL (27 Apr 2020 10:47)    I&O's Summary    27 Apr 2020 07:01  -  28 Apr 2020 07:00  --------------------------------------------------------  IN: 580 mL / OUT: 0 mL / NET: 580 mL      PHYSICAL EXAM:  Vital Signs Last 24 Hrs  T(C): 37 (28 Apr 2020 05:06), Max: 37 (28 Apr 2020 05:06)  T(F): 98.6 (28 Apr 2020 05:06), Max: 98.6 (28 Apr 2020 05:06)  HR: 108 (28 Apr 2020 05:06) (108 - 112)  BP: 138/74 (28 Apr 2020 05:06) (131/75 - 138/74)  BP(mean): --  RR: 22 (28 Apr 2020 05:06) (22 - 22)  SpO2: 90% (28 Apr 2020 05:06) (90% - 94%)    CONSTITUTIONAL: Laying prone on NRB, somewhat uncomfortable with increased work of breathing  HEENT: MMM, anicteric  RESPIRATORY: Increased work of breathing, on NRB  CARDIOVASCULAR: Tachycardic, borderline   ABDOMEN: Unable to assess given prone positioning  EXTREMITIES: No LE edema  PSYCH: unable to assess given clinical status and language barrier  NEUROLOGY: unable to assess given clinical status and language barrier  SKIN: No rashes on exposed skin    LABS:                        12.4   6.59  )-----------( 325      ( 27 Apr 2020 06:33 )             40.7     04-27    139  |  100  |  41<H>  ----------------------------<  206<H>  4.9   |  27  |  1.16    Ca    8.7      27 Apr 2020 06:32    TPro  7.0  /  Alb  3.1<L>  /  TBili  0.5  /  DBili  x   /  AST  96<H>  /  ALT  60<H>  /  AlkPhos  77  04-27          COVID-19 PCR: Detected (04-19-20 @ 00:58)    RECENT PERTINENT RADIOLOGY & ADDITIONAL TESTS:  < from: Xray Chest 1 View-PORTABLE IMMEDIATE (04.28.20 @ 00:27) >  PROCEDURE DATE:  04/28/2020    INTERPRETATION:  A single chest x-ray was obtained on April 28, 2020.  Indication: Hypoxia.  Impression:  The heart is normal in size. Diffuse airspace opacity is seen throughout both lungs which appears to be worsening when compared to previous study done April 21, 2020.  < end of copied text >

## 2020-04-28 NOTE — PROVIDER CONTACT NOTE (OTHER) - ASSESSMENT
RN noticed pt O2 sat fluctuating from 80-83% on 15L non-rebreather. no signs of respiratory distress noted, however pt using abd accessory muscle. pt denies chest pain. RN repositioned pt on left side for comfort, however pt continue to sat 83% on 15L NRB. safety maintained. NP Sanjana made aware. awaiting for NP to bedside assessment.

## 2020-04-28 NOTE — CONSULT NOTE ADULT - PROBLEM SELECTOR RECOMMENDATION 4
will continue to follow pending clinical course  discussed with primary team and family, see [facility] note by Dr. Maher  [phone] if acute issues

## 2020-04-29 LAB
CULTURE RESULTS: SIGNIFICANT CHANGE UP
CULTURE RESULTS: SIGNIFICANT CHANGE UP
GLUCOSE BLDC GLUCOMTR-MCNC: 146 MG/DL — HIGH (ref 70–99)
GLUCOSE BLDC GLUCOMTR-MCNC: 223 MG/DL — HIGH (ref 70–99)
GLUCOSE BLDC GLUCOMTR-MCNC: 307 MG/DL — HIGH (ref 70–99)
GLUCOSE BLDC GLUCOMTR-MCNC: 435 MG/DL — HIGH (ref 70–99)
SPECIMEN SOURCE: SIGNIFICANT CHANGE UP
SPECIMEN SOURCE: SIGNIFICANT CHANGE UP

## 2020-04-29 PROCEDURE — 99232 SBSQ HOSP IP/OBS MODERATE 35: CPT

## 2020-04-29 RX ORDER — INSULIN GLARGINE 100 [IU]/ML
6 INJECTION, SOLUTION SUBCUTANEOUS ONCE
Refills: 0 | Status: COMPLETED | OUTPATIENT
Start: 2020-04-29 | End: 2020-04-29

## 2020-04-29 RX ORDER — INSULIN GLARGINE 100 [IU]/ML
12 INJECTION, SOLUTION SUBCUTANEOUS EVERY MORNING
Refills: 0 | Status: DISCONTINUED | OUTPATIENT
Start: 2020-04-30 | End: 2020-04-30

## 2020-04-29 RX ADMIN — Medication 2: at 08:31

## 2020-04-29 RX ADMIN — ZINC SULFATE TAB 220 MG (50 MG ZINC EQUIVALENT) 220 MILLIGRAM(S): 220 (50 ZN) TAB at 16:59

## 2020-04-29 RX ADMIN — ALBUTEROL 2 PUFF(S): 90 AEROSOL, METERED ORAL at 13:23

## 2020-04-29 RX ADMIN — Medication 4: at 17:59

## 2020-04-29 RX ADMIN — INSULIN GLARGINE 6 UNIT(S): 100 INJECTION, SOLUTION SUBCUTANEOUS at 08:30

## 2020-04-29 RX ADMIN — ALBUTEROL 2 PUFF(S): 90 AEROSOL, METERED ORAL at 06:10

## 2020-04-29 RX ADMIN — FAMOTIDINE 40 MILLIGRAM(S): 10 INJECTION INTRAVENOUS at 18:01

## 2020-04-29 RX ADMIN — FAMOTIDINE 40 MILLIGRAM(S): 10 INJECTION INTRAVENOUS at 06:03

## 2020-04-29 RX ADMIN — Medication 500 MILLIGRAM(S): at 13:26

## 2020-04-29 RX ADMIN — Medication 108 GRAM(S): at 13:25

## 2020-04-29 RX ADMIN — ENOXAPARIN SODIUM 40 MILLIGRAM(S): 100 INJECTION SUBCUTANEOUS at 06:03

## 2020-04-29 RX ADMIN — Medication 108 GRAM(S): at 06:10

## 2020-04-29 RX ADMIN — ENOXAPARIN SODIUM 40 MILLIGRAM(S): 100 INJECTION SUBCUTANEOUS at 18:00

## 2020-04-29 RX ADMIN — ALBUTEROL 2 PUFF(S): 90 AEROSOL, METERED ORAL at 22:47

## 2020-04-29 RX ADMIN — Medication 6: at 13:23

## 2020-04-29 RX ADMIN — Medication 81 MILLIGRAM(S): at 13:26

## 2020-04-29 RX ADMIN — Medication 108 GRAM(S): at 20:52

## 2020-04-29 RX ADMIN — HYDROMORPHONE HYDROCHLORIDE 0.25 MILLIGRAM(S): 2 INJECTION INTRAMUSCULAR; INTRAVENOUS; SUBCUTANEOUS at 18:01

## 2020-04-29 RX ADMIN — INSULIN GLARGINE 6 UNIT(S): 100 INJECTION, SOLUTION SUBCUTANEOUS at 13:25

## 2020-04-29 RX ADMIN — ALBUTEROL 2 PUFF(S): 90 AEROSOL, METERED ORAL at 01:10

## 2020-04-29 RX ADMIN — Medication 1 MILLIGRAM(S): at 13:26

## 2020-04-29 RX ADMIN — ALBUTEROL 2 PUFF(S): 90 AEROSOL, METERED ORAL at 17:59

## 2020-04-29 NOTE — PROGRESS NOTE ADULT - ASSESSMENT
84M Kitt[place] speaking, DM2, HLD, known COVID-19 positive with recent ED visit, brought by EMS from home for blood culture growing gram positive rods consistent with 2 different species of cornyebacterium in 2/4 bottles and AMS. AMS improving since admission. Course c/b hypoxia requiring NRB since 4/23, s/p Plaquenil (initiated as outpatient) s/p toco 4/24, s/p plasma trial 4/25, on abx for possible UTI vs bacteruria, repeat blood cx with NGTD.    #COVID-19 infection, Viral Pneumonia 2/2 Novel Coronavirus Infection (COVID-19): S/p Plaquenil (completed 4/22), s/p dose of tocizulmab 4/24; s/p Plasma trial (4/25)  --On NRB since 4/23.   --DNR/DNI at this time; will continue NRB, proning (as tolerated)  --Dilaudid PRN for SOB/dyspnea; appreciate palliative care input  --Closely monitor respiratory status and escalate O2 therapy as necessary to maintain SpO2 92-96%; currently 100% NRB -> intubation (avoiding BiPAP/CPAP given risk of aerosolizing virus). Use continuous oximetry monitoring if/when on NRB.  --c/w daily lovenox given increased hypercoagulable state in COVID-19 patients (BMI > 30 and CrCl > 15ml/min: Lovenox 40mg SQ BID)  --Monitor daily (or as needed): CBC, CMP, Mg, Phos, CPK. Monitor inflammatory markers [id number] to monitor disease progression (procalcitonin, CRP, ESR, ferritin, coags, D-dimer, LDH)   --Limit IVF given risk of ARDS  --Appreciate pulmonology input    #Bacteremia, bacteruria: GNR bacteremia in 2 of 4 cultures from 4/19 consistent with two different corynebacterium. S/p course of azithromycin prior to positive culture result  --UCX concerning for enterococcus though less than 99,000, started on ampicillin for 5 day course (4/24-4/30)  --Follow up repeat cultures (sent on 4/21, 4/22)--NGTD    #Encephalopathy/Altered mental status at admission: Likely 2/2 infection. CT head without acute changes  --Frequent orientation  --Restraints as needed to prevent removal of supplemental O2  --Per prior discussion with neurology, likely hospital delirium vs encephalopathy seen with COVID-19    #Type 2DM: A1c 8.4  --Continue to hold home meds for now  --Goal FSBG 100-180   --Low dose ISS for now  --Lantus 6U daily   --If consistent PO intake and if FSBG >250 x2 add 2 units lispro before meals   --Diabetic diet     #HTN: Not on home meds for HTN  --Continue to monitor, goal <130/80 in DM   --If remains elevated, can start low dose Norvasc as needed     #Constipation: Patient unable to state clearly whether he has had BM and this AM nurse was unsure  --Currently only on senna QHS, but has received one time doses of lactulose, suppository, etc  --Will adjust as needed    #Elevated liver tests: Suspect 2/2 COVID-19  --Monitor for now  --No indication for imaging at this time    #DVT Prophylaxis  --Given increased hypercoagulable state in COVID-19 patients, c/w Lovenox 40 BID: BMI > 30 and CrCl > 15ml/min  --Will consider need for extended prophylaxis prior to discharge based on D-Dimer and calculated VTE risk.    #DC planning/care coordination  --DNR/DNI (See notes from 4/28)  --Appreciate palliative care input  --Contact: Dale Wiley [phone] (updated daily); Mau (hodlen) [phone]  --Eventual plan for discharge with proper post-hospitalization instructions, including self-isolation at home, close monitoring of symptoms, etc.   --PT eval/case management once clinically stable    Above care plan discussed with: 7 Duane STERLING, ________    Camryn Maher MD  Attending, Division of Gastroenterology 84M Lebanese speaking, DM2, HLD, known COVID-19 positive with recent ED visit, brought by EMS from home for blood culture growing gram positive rods consistent with 2 different species of cornyebacterium in 2/4 bottles and AMS. AMS improving since admission. Course c/b hypoxia requiring NRB since 4/23, s/p Plaquenil (initiated as outpatient) s/p toco 4/24, s/p plasma trial 4/25, on abx for possible UTI vs bacteruria, repeat blood cx with NGTD.    #COVID-19 infection, Viral Pneumonia 2/2 Novel Coronavirus Infection (COVID-19): S/p Plaquenil (completed 4/22), s/p dose of tocizulmab 4/24; s/p Plasma trial (4/25)  --Remains on NRB since 4/23, satting <90%  --DNR/DNI at this time; will continue NRB, proning (as tolerated)  --Dilaudid PRN for SOB/dyspnea; appreciate palliative care input  --Closely monitor respiratory status and escalate O2 therapy as necessary to maintain SpO2 92-96%; currently 100% NRB -> intubation (avoiding BiPAP/CPAP given risk of aerosolizing virus). Use continuous oximetry monitoring if/when on NRB.  --c/w daily lovenox given increased hypercoagulable state in COVID-19 patients (BMI > 30 and CrCl > 15ml/min: Lovenox 40mg SQ BID)  --Monitor daily (or as needed): CBC, CMP, Mg, Phos, CPK. Monitor inflammatory markers [id number] to monitor disease progression (procalcitonin, CRP, ESR, ferritin, coags, D-dimer, LDH)   --Limit IVF given risk of ARDS  --Appreciate pulmonology input    #Bacteremia, bacteruria: GNR bacteremia in 2 of 4 cultures from 4/19 consistent with two different corynebacterium. S/p course of azithromycin prior to positive culture result  --UCX concerning for enterococcus though less than 99,000, started on ampicillin for 5 day course (4/24-4/30)  --Follow up repeat cultures (sent on 4/21, 4/22)--NGTD    #Encephalopathy/Altered mental status at admission: Likely 2/2 infection. CT head without acute changes  --Frequent orientation  --Restraints as needed to prevent removal of supplemental O2  --Per prior discussion with neurology, likely hospital delirium vs encephalopathy seen with COVID-19    #Type 2DM: A1c 8.4  --Continue to hold home meds for now  --Goal FSBG 100-180   --Low dose ISS for now  --Lantus 6U daily   --If consistent PO intake and if FSBG >250 x2 add 2 units lispro before meals   --Diabetic diet     #HTN: Not on home meds for HTN  --Continue to monitor, goal <130/80 in DM   --If remains elevated, can start low dose Norvasc as needed     #Constipation: Patient unable to state clearly whether he has had BM and this AM nurse was unsure  --Currently only on senna QHS, but has received one time doses of lactulose, suppository, etc  --Will adjust as needed    #Elevated liver tests: Suspect 2/2 COVID-19  --Monitor for now  --No indication for imaging at this time    #DVT Prophylaxis  --Given increased hypercoagulable state in COVID-19 patients, c/w Lovenox 40 BID: BMI > 30 and CrCl > 15ml/min  --Will consider need for extended prophylaxis prior to discharge based on D-Dimer and calculated VTE risk.    #DC planning/care coordination  --DNR/DNI (See notes from 4/28)  --Appreciate palliative care input  --Contact: Dale Wiley [phone] (updated daily); Mau (holden) [phone]  --Eventual plan for discharge with proper post-hospitalization instructions, including self-isolation at home, close monitoring of symptoms, etc.   --PT eval/case management once clinically stable    Above care plan discussed with: 7 Duane STERLING, Shahnaz Maher MD  Attending, Division of Gastroenterology 84M Gambian speaking, DM2, HLD, known COVID-19 positive with recent ED visit, brought by EMS from home for blood culture growing gram positive rods consistent with 2 different species of cornyebacterium in 2/4 bottles and AMS. AMS improving since admission. Course c/b hypoxia requiring NRB since 4/23, s/p Plaquenil (initiated as outpatient) s/p toco 4/24, s/p plasma trial 4/25, on abx for possible UTI vs bacteruria, repeat blood cx with NGTD.    #COVID-19 infection, Viral Pneumonia 2/2 Novel Coronavirus Infection (COVID-19): S/p Plaquenil (completed 4/22), s/p dose of tocizulmab 4/24; s/p Plasma trial (4/25)  --Remains on NRB since 4/23, satting <90%  --DNR/DNI at this time; will continue NRB, proning (as tolerated)  --Dilaudid PRN for SOB/dyspnea; appreciate palliative care input  --Closely monitor respiratory status and escalate O2 therapy as necessary to maintain SpO2 92-96%; currently 100% NRB -> intubation (avoiding BiPAP/CPAP given risk of aerosolizing virus). Use continuous oximetry monitoring if/when on NRB.  --c/w daily lovenox given increased hypercoagulable state in COVID-19 patients (BMI > 30 and CrCl > 15ml/min: Lovenox 40mg SQ BID)  --Monitor daily (or as needed): CBC, CMP, Mg, Phos, CPK. Monitor inflammatory markers [id number] to monitor disease progression (procalcitonin, CRP, ESR, ferritin, coags, D-dimer, LDH)   --Limit IVF given risk of ARDS  --Appreciate pulmonology input    #Bacteremia, bacteruria: GNR bacteremia in 2 of 4 cultures from 4/19 consistent with two different corynebacterium. S/p course of azithromycin prior to positive culture result  --UCX concerning for enterococcus though less than 99,000, started on ampicillin for 5 day course (4/24-4/30)  --Follow up repeat cultures (sent on 4/21, 4/22)--NGTD    #Encephalopathy/Altered mental status at admission: Likely 2/2 infection. CT head without acute changes  --Frequent orientation  --Restraints as needed to prevent removal of supplemental O2  --Per prior discussion with neurology, likely hospital delirium vs encephalopathy seen with COVID-19    #Type 2DM: A1c 8.4  --Continue to hold home meds for now  --Goal FSBG 100-180   --Low dose ISS for now  --Lantus 6U daily   --If consistent PO intake and if FSBG >250 x2 add 2 units lispro before meals   --Diabetic diet     #HTN: Not on home meds for HTN  --Continue to monitor, goal <130/80 in DM   --If remains elevated, can start low dose Norvasc as needed     #Constipation: Patient unable to state clearly whether he has had BM and this AM nurse was unsure  --Currently only on senna QHS, but has received one time doses of lactulose, suppository, etc  --Will adjust as needed    #Elevated liver tests: Suspect 2/2 COVID-19  --Monitor for now  --No indication for imaging at this time    #DVT Prophylaxis  --Given increased hypercoagulable state in COVID-19 patients, c/w Lovenox 40 BID: BMI > 30 and CrCl > 15ml/min  --Will consider need for extended prophylaxis prior to discharge based on D-Dimer and calculated VTE risk.    #DC planning/care coordination  --DNR/DNI with some comfort measures, but not full comfort care (See notes from 4/28)  --Appreciate palliative care input  --Contact: Dale Wiley [phone] (updated daily); Mau (son) [phone]; family updated daily  --Eventual plan for discharge with proper post-hospitalization instructions, including self-isolation at home, close monitoring of symptoms, etc.   --PT eval/case management once clinically stable    Above care plan discussed with: 7 Duane STERLING, Shahnaz Maher MD  Attending, Division of Gastroenterology 84M Canadian speaking, DM2, HLD, known COVID-19 positive with recent ED visit, brought by EMS from home for blood culture growing gram positive rods consistent with 2 different species of cornyebacterium in 2/4 bottles and AMS. AMS improving since admission. Course c/b hypoxia requiring NRB since 4/23, s/p Plaquenil (initiated as outpatient) s/p toco 4/24, s/p plasma trial 4/25, on abx for possible UTI vs bacteruria, repeat blood cx with NGTD.    #COVID-19 infection, Viral Pneumonia 2/2 Novel Coronavirus Infection (COVID-19): S/p Plaquenil (completed 4/22), s/p dose of tocizulmab 4/24; s/p Plasma trial (4/25)  --Remains on NRB since 4/23, satting <90%  --DNR/DNI at this time; will continue NRB, proning (as tolerated)  --Dilaudid PRN for SOB/dyspnea; appreciate palliative care input  --Closely monitor respiratory status and escalate O2 therapy as necessary to maintain SpO2 92-96%; currently 100% NRB -> intubation (avoiding BiPAP/CPAP given risk of aerosolizing virus). Use continuous oximetry monitoring if/when on NRB.  --c/w daily lovenox given increased hypercoagulable state in COVID-19 patients (BMI > 30 and CrCl > 15ml/min: Lovenox 40mg SQ BID)  --Monitor daily (or as needed): CBC, CMP, Mg, Phos, CPK. Monitor inflammatory markers [id number] to monitor disease progression (procalcitonin, CRP, ESR, ferritin, coags, D-dimer, LDH)   --Limit IVF given risk of ARDS  --Appreciate pulmonology input    #Bacteremia, bacteruria: GNR bacteremia in 2 of 4 cultures from 4/19 consistent with two different corynebacterium. S/p course of azithromycin prior to positive culture result  --UCX concerning for enterococcus though less than 99,000, started on ampicillin for 5 day course (4/24-4/30)  --Follow up repeat cultures (sent on 4/21, 4/22)--NGTD    #Encephalopathy/Altered mental status at admission: Likely 2/2 infection. CT head without acute changes  --Frequent orientation  --Restraints as needed to prevent removal of supplemental O2  --Per prior discussion with neurology, likely hospital delirium vs encephalopathy seen with COVID-19    #Type 2DM: A1c 8.4  --Continue to hold home meds for now  --Goal FSBG 100-180   --Low dose ISS for now  --Will increase Lantus to 12U daily after discussion with [facility] given elevated blood glucose and increased PRN needs yesterday (12U additional given yesterday)  --Can consider additional of standing 2U lispro before meals if FSBG remain elevated  --Diabetic diet     #HTN: Not on home meds for HTN  --Continue to monitor, goal <130/80 in DM   --If remains elevated, can start low dose Norvasc as needed     #Constipation: Patient unable to state clearly whether he has had BM and this AM nurse was unsure  --Currently only on senna QHS, but has received one time doses of lactulose, suppository, etc  --Will adjust as needed    #Elevated liver tests: Suspect 2/2 COVID-19  --Monitor for now  --No indication for imaging at this time    #DVT Prophylaxis  --Given increased hypercoagulable state in COVID-19 patients, c/w Lovenox 40 BID: BMI > 30 and CrCl > 15ml/min  --Will consider need for extended prophylaxis prior to discharge based on D-Dimer and calculated VTE risk.    #DC planning/care coordination  --DNR/DNI with some comfort measures, but not full comfort care (See notes from 4/28)  --Appreciate palliative care input  --Contact: Dale Wiley [phone] (updated daily); Mau (son) [phone]; family updated daily  --Eventual plan for discharge with proper post-hospitalization instructions, including self-isolation at home, close monitoring of symptoms, etc.   --PT eval/case management once clinically stable    Above care plan discussed with: Alexandra Zepeda ACP, Shahnaz Maher MD  Attending, Division of Gastroenterology

## 2020-04-29 NOTE — RAPID RESPONSE TEAM SUMMARY - NSSITUATIONBACKGROUNDRRT_GEN_ALL_CORE
84M Italian speaking, DM2, HLD, known COVID-19 positive with recent ED visit, brought by EMS from home for blood culture growing gram positive rods consistent with 2 different species of cornyebacterium in 2/4 bottles and AMS. AMS improving since admission. Course c/b hypoxia requiring NRB since 4/23, s/p Plaquenil (initiated as outpatient) s/p toco 4/24, s/p plasma trial 4/25, on abx for possible UTI vs bacteruria, repeat blood cx with NGTD.
84M [place]uneian speaking, DM2, HLD, known COVID-19 positive with recent ED visit, brought by EMS from home for blood culture growing gram positive rods consistent with 2 different species of cornyebacterium in 2/4 bottles and AMS. AMS improving since admission. Course c/b hypoxia requiring NRB since 4/23, s/p Plaquenil (initiated as outpatient) s/p toco 4/24, s/p plasma trial 4/25, on abx for possible UTI vs bacteruria, repeat blood cx with NGTD.  RRT called for hypoxemia to 80% while partially proned. Pt had RRT overnight as well and given furosemide and methylpred. Patient appeared agitated and was on restraints. 99.1F rectally; 112 HR; 130s/80s; 82-86% on 100% NRB. Did not prone patient as given agitation would likely be unable to tolerate it and potentially remove mask.   Family called given progression over the week and that intubation would be unlikely to change clinical outcome. Family decided to come into the hospital. GOC per [facility] chart note.
84 year old man DM2, HLD, known COVID-19 positive c/b blood culture growing gram positive rods consistent with 2 different species of cornyebacterium in 2/4 bottles and AMS.  Course c/b hypoxia requiring NRB since 4/23, s/p Plaquenil (initiated as outpatient) s/p toco 4/24, s/p plasma trial 4/25, on abx for possible UTI vs bacteruria, repeat blood cx with NGTD.  RRT called for severe hypoxia, however with fixing of NRB, pt's O2 sat increased to 90%.

## 2020-04-29 NOTE — CHART NOTE - NSCHARTNOTEFT_GEN_A_CORE
Chart reviewed, patient satting 80's on NRB  dilaudid and ativan remain available for symptoms  no PRNs required overnight, will remain available as needed for symptom management

## 2020-04-29 NOTE — CHART NOTE - NSCHARTNOTEFT_GEN_A_CORE
Nutrition Follow-up Note  Patient seen for: nutrition follow up    Chart reviewed, events noted. "Seen by family yesterday and in-person GOC held with sons (see chart note); patient now DNR/DNI." Per palliative, family "would like patient to receive food/water despite discussion and known risk of aspiration"    Source of Information:  Face-to-face RD interview deferred at this time secondary to restricted isolation precautions with pt medical condition. Pt unable to participate appropriately in RD interview at this time. RN unavailable. Information obtained from electronic medical record at this time.    Current Diet: Consistent Carbohydrate (with evening snacks) + 3 glucerna    GI: no acute GI distress noted, last BM 4/28    PO intake: unable to obtain, not documented in nursing flow sheet.    Weights: no new wt to assess    Previous Nutrition Diagnosis: Inadequate protein-energy intake    New Nutrition Diagnosis: n/a    Nutrition Care Plan:  [ ] In progress   [x] Achieved- aggressive nutrition interventions not indicated given family GOC noted. RD remains available as needed.    Nutrition Recommendations:  1. Continue Consistent Carbohydrate (with evening snacks) therapeutic diet  2. Continue to provide Glucerna 3x/daily (provides additional 660kcal, 44g pro)     Monitoring and Evaluation:   Continue to monitor Nutritional intake, Tolerance to diet prescription, weights, labs, skin integrity    RD remains available upon request and will follow up per protocol. Melissa Obando RD, [phone]

## 2020-04-29 NOTE — RAPID RESPONSE TEAM SUMMARY - NSADDTLFINDINGSRRT_GEN_ALL_CORE
RRT called for hypoxia to high 70s on 15L NRB. Prior to RRT patient received 125 mg of IV solumedrol. During RRT he was proned with marked improvement in oxygenation. CXR showed worsening b/l pulmonary infiltrates/pulmonary edema. IV fluids were discontinued. He was given furosemide 40 mg IV x 1. Oxygen improved to 88%. On exam mildly tachypneic and tachycardic. Remains full code
-DNR/DNI with not full comfort measure  -Dilaudid and ativan ordered PRN for dyspnea

## 2020-04-29 NOTE — PROGRESS NOTE ADULT - SUBJECTIVE AND OBJECTIVE BOX
***PRELIM NOTE****        No acute overnight events. Seen by family yesterday and in-person GOC held with sons (see chart note); patient now DNR/DNI. Remains on NRB 15L, satting 80-87%. Division of Hospital Medicine Progress Note    Patient is a 84y old  Male who presents with a chief complaint of positive blood culture (29 Apr 2020 08:09)    SUBJECTIVE / OVERNIGHT EVENTS: No acute overnight events. Seen by family yesterday and in-person GOC held with sons (see chart note); patient now DNR/DNI. Remains on NRB 15L, satting 80-87%. Limited history as patient somewhat confused, on NRB, [place]-speaking, but not answering/responding to  phone.     ADDITIONAL REVIEW OF SYSTEMS: Limited, as above    MEDICATIONS  (STANDING):  ALBUTerol    90 MICROgram(s) HFA Inhaler 2 Puff(s) Inhalation every 6 hours  ampicillin  IVPB 1 Gram(s) IV Intermittent every 8 hours  ascorbic acid 500 milliGRAM(s) Oral daily  aspirin  chewable 81 milliGRAM(s) Oral daily  dextrose 5%. 1000 milliLiter(s) (50 mL/Hr) IV Continuous <Continuous>  dextrose 50% Injectable 12.5 Gram(s) IV Push once  dextrose 50% Injectable 25 Gram(s) IV Push once  dextrose 50% Injectable 25 Gram(s) IV Push once  enoxaparin Injectable 40 milliGRAM(s) SubCutaneous two times a day  famotidine    Tablet 40 milliGRAM(s) Oral two times a day  folic acid 1 milliGRAM(s) Oral daily  insulin glargine Injectable (LANTUS) 6 Unit(s) SubCutaneous every morning  insulin lispro (HumaLOG) corrective regimen sliding scale   SubCutaneous three times a day before meals  senna 2 Tablet(s) Oral at bedtime  sodium chloride 0.9%. 1000 milliLiter(s) (10 mL/Hr) IV Continuous <Continuous>  zinc sulfate 220 milliGRAM(s) Oral daily    MEDICATIONS  (PRN):  acetaminophen   Tablet .. 650 milliGRAM(s) Oral every 4 hours PRN Temp greater or equal to 38.5C (101.3F)  benzonatate 100 milliGRAM(s) Oral three times a day PRN Cough  dextrose 40% Gel 15 Gram(s) Oral once PRN Blood Glucose LESS THAN 70 milliGRAM(s)/deciliter  glucagon  Injectable 1 milliGRAM(s) IntraMuscular once PRN Glucose LESS THAN 70 milligrams/deciliter  HYDROmorphone  Injectable 0.25 milliGRAM(s) IV Push every 2 hours PRN shortness of breath/dyspnea  LORazepam   Injectable 0.25 milliGRAM(s) IV Push every 2 hours PRN Agitation  nystatin Powder 1 Application(s) Topical three times a day PRN rash MAD  sodium chloride 0.65% Nasal 1 Spray(s) Both Nostrils once PRN Nasal Congestion      CAPILLARY BLOOD GLUCOSE      POCT Blood Glucose.: 223 mg/dL (29 Apr 2020 08:30)  POCT Blood Glucose.: 236 mg/dL (28 Apr 2020 21:30)  POCT Blood Glucose.: 288 mg/dL (28 Apr 2020 17:25)  POCT Blood Glucose.: 394 mg/dL (28 Apr 2020 11:36)  POCT Blood Glucose.: 378 mg/dL (28 Apr 2020 11:33)    I&O's Summary    28 Apr 2020 07:01  -  29 Apr 2020 07:00  --------------------------------------------------------  IN: 1280 mL / OUT: 0 mL / NET: 1280 mL        PHYSICAL EXAM:  Vital Signs Last 24 Hrs  T(C): 37.1 (29 Apr 2020 08:17), Max: 37.3 (28 Apr 2020 16:29)  T(F): 98.8 (29 Apr 2020 08:17), Max: 99.2 (28 Apr 2020 16:29)  HR: 125 (29 Apr 2020 08:17) (107 - 125)  BP: 131/80 (29 Apr 2020 08:17) (115/68 - 131/84)  BP(mean): --  RR: 22 (29 Apr 2020 08:17) (22 - 22)  SpO2: 89% (29 Apr 2020 08:17) (80% - 89%)    CONSTITUTIONAL: Laying supine on NRB, appears more comfortable than yesterday, but still tachypneic  HEENT: MMM, anicteric  RESPIRATORY: More comfortable than yesterday, remains on NRB, tachypneic  CARDIOVASCULAR: Tachycardic  ABDOMEN: Soft  PSYCH: unable to assess given clinical status and language barrier  NEUROLOGY: unable to assess given clinical status and language barrier  SKIN: No rashes on exposed skin    LABS:      COVID-19 PCR: Detected (04-19-20 @ 00:58)      RECENT PERTINENT RADIOLOGY & ADDITIONAL TESTS:

## 2020-04-30 VITALS
DIASTOLIC BLOOD PRESSURE: 75 MMHG | TEMPERATURE: 98 F | OXYGEN SATURATION: 86 % | SYSTOLIC BLOOD PRESSURE: 129 MMHG | RESPIRATION RATE: 25 BRPM | HEART RATE: 111 BPM

## 2020-04-30 LAB
ALBUMIN SERPL ELPH-MCNC: 3.1 G/DL — LOW (ref 3.3–5)
ALP SERPL-CCNC: 145 U/L — HIGH (ref 40–120)
ALT FLD-CCNC: 84 U/L — HIGH (ref 10–45)
ANION GAP SERPL CALC-SCNC: 15 MMOL/L — SIGNIFICANT CHANGE UP (ref 5–17)
AST SERPL-CCNC: 89 U/L — HIGH (ref 10–40)
BASOPHILS # BLD AUTO: 0.04 K/UL — SIGNIFICANT CHANGE UP (ref 0–0.2)
BASOPHILS NFR BLD AUTO: 0.4 % — SIGNIFICANT CHANGE UP (ref 0–2)
BILIRUB SERPL-MCNC: 0.5 MG/DL — SIGNIFICANT CHANGE UP (ref 0.2–1.2)
BUN SERPL-MCNC: 79 MG/DL — HIGH (ref 7–23)
CALCIUM SERPL-MCNC: 8.8 MG/DL — SIGNIFICANT CHANGE UP (ref 8.4–10.5)
CHLORIDE SERPL-SCNC: 106 MMOL/L — SIGNIFICANT CHANGE UP (ref 96–108)
CO2 SERPL-SCNC: 28 MMOL/L — SIGNIFICANT CHANGE UP (ref 22–31)
CREAT SERPL-MCNC: 1.72 MG/DL — HIGH (ref 0.5–1.3)
EOSINOPHIL # BLD AUTO: 0.55 K/UL — HIGH (ref 0–0.5)
EOSINOPHIL NFR BLD AUTO: 5.9 % — SIGNIFICANT CHANGE UP (ref 0–6)
GLUCOSE SERPL-MCNC: 128 MG/DL — HIGH (ref 70–99)
HCT VFR BLD CALC: 44.2 % — SIGNIFICANT CHANGE UP (ref 39–50)
HGB BLD-MCNC: 13.3 G/DL — SIGNIFICANT CHANGE UP (ref 13–17)
IMM GRANULOCYTES NFR BLD AUTO: 0.6 % — SIGNIFICANT CHANGE UP (ref 0–1.5)
LYMPHOCYTES # BLD AUTO: 0.83 K/UL — LOW (ref 1–3.3)
LYMPHOCYTES # BLD AUTO: 9 % — LOW (ref 13–44)
MCHC RBC-ENTMCNC: 27.3 PG — SIGNIFICANT CHANGE UP (ref 27–34)
MCHC RBC-ENTMCNC: 30.1 GM/DL — LOW (ref 32–36)
MCV RBC AUTO: 90.8 FL — SIGNIFICANT CHANGE UP (ref 80–100)
MONOCYTES # BLD AUTO: 0.31 K/UL — SIGNIFICANT CHANGE UP (ref 0–0.9)
MONOCYTES NFR BLD AUTO: 3.3 % — SIGNIFICANT CHANGE UP (ref 2–14)
NEUTROPHILS # BLD AUTO: 7.47 K/UL — HIGH (ref 1.8–7.4)
NEUTROPHILS NFR BLD AUTO: 80.8 % — HIGH (ref 43–77)
NRBC # BLD: 0 /100 WBCS — SIGNIFICANT CHANGE UP (ref 0–0)
PLATELET # BLD AUTO: 342 K/UL — SIGNIFICANT CHANGE UP (ref 150–400)
POTASSIUM SERPL-MCNC: 4.9 MMOL/L — SIGNIFICANT CHANGE UP (ref 3.5–5.3)
POTASSIUM SERPL-SCNC: 4.9 MMOL/L — SIGNIFICANT CHANGE UP (ref 3.5–5.3)
PROT SERPL-MCNC: 6.9 G/DL — SIGNIFICANT CHANGE UP (ref 6–8.3)
RBC # BLD: 4.87 M/UL — SIGNIFICANT CHANGE UP (ref 4.2–5.8)
RBC # FLD: 14.6 % — HIGH (ref 10.3–14.5)
SODIUM SERPL-SCNC: 149 MMOL/L — HIGH (ref 135–145)
WBC # BLD: 9.26 K/UL — SIGNIFICANT CHANGE UP (ref 3.8–10.5)
WBC # FLD AUTO: 9.26 K/UL — SIGNIFICANT CHANGE UP (ref 3.8–10.5)

## 2020-04-30 PROCEDURE — 83605 ASSAY OF LACTIC ACID: CPT

## 2020-04-30 PROCEDURE — 84295 ASSAY OF SERUM SODIUM: CPT

## 2020-04-30 PROCEDURE — 82330 ASSAY OF CALCIUM: CPT

## 2020-04-30 PROCEDURE — 83615 LACTATE (LD) (LDH) ENZYME: CPT

## 2020-04-30 PROCEDURE — 82550 ASSAY OF CK (CPK): CPT

## 2020-04-30 PROCEDURE — 82565 ASSAY OF CREATININE: CPT

## 2020-04-30 PROCEDURE — 94640 AIRWAY INHALATION TREATMENT: CPT

## 2020-04-30 PROCEDURE — 97110 THERAPEUTIC EXERCISES: CPT

## 2020-04-30 PROCEDURE — 82803 BLOOD GASES ANY COMBINATION: CPT

## 2020-04-30 PROCEDURE — 83036 HEMOGLOBIN GLYCOSYLATED A1C: CPT

## 2020-04-30 PROCEDURE — 85014 HEMATOCRIT: CPT

## 2020-04-30 PROCEDURE — 87086 URINE CULTURE/COLONY COUNT: CPT

## 2020-04-30 PROCEDURE — 85652 RBC SED RATE AUTOMATED: CPT

## 2020-04-30 PROCEDURE — 99285 EMERGENCY DEPT VISIT HI MDM: CPT | Mod: 25

## 2020-04-30 PROCEDURE — 86140 C-REACTIVE PROTEIN: CPT

## 2020-04-30 PROCEDURE — 83735 ASSAY OF MAGNESIUM: CPT

## 2020-04-30 PROCEDURE — 87186 SC STD MICRODIL/AGAR DIL: CPT

## 2020-04-30 PROCEDURE — 93005 ELECTROCARDIOGRAM TRACING: CPT

## 2020-04-30 PROCEDURE — 82728 ASSAY OF FERRITIN: CPT

## 2020-04-30 PROCEDURE — 84132 ASSAY OF SERUM POTASSIUM: CPT

## 2020-04-30 PROCEDURE — 71045 X-RAY EXAM CHEST 1 VIEW: CPT

## 2020-04-30 PROCEDURE — 70450 CT HEAD/BRAIN W/O DYE: CPT

## 2020-04-30 PROCEDURE — 80053 COMPREHEN METABOLIC PANEL: CPT

## 2020-04-30 PROCEDURE — 97162 PT EVAL MOD COMPLEX 30 MIN: CPT

## 2020-04-30 PROCEDURE — 84484 ASSAY OF TROPONIN QUANT: CPT

## 2020-04-30 PROCEDURE — 82947 ASSAY GLUCOSE BLOOD QUANT: CPT

## 2020-04-30 PROCEDURE — 84100 ASSAY OF PHOSPHORUS: CPT

## 2020-04-30 PROCEDURE — 97166 OT EVAL MOD COMPLEX 45 MIN: CPT

## 2020-04-30 PROCEDURE — 87040 BLOOD CULTURE FOR BACTERIA: CPT

## 2020-04-30 PROCEDURE — 86901 BLOOD TYPING SEROLOGIC RH(D): CPT

## 2020-04-30 PROCEDURE — 86850 RBC ANTIBODY SCREEN: CPT

## 2020-04-30 PROCEDURE — 84145 PROCALCITONIN (PCT): CPT

## 2020-04-30 PROCEDURE — 82962 GLUCOSE BLOOD TEST: CPT

## 2020-04-30 PROCEDURE — 82435 ASSAY OF BLOOD CHLORIDE: CPT

## 2020-04-30 PROCEDURE — 81001 URINALYSIS AUTO W/SCOPE: CPT

## 2020-04-30 PROCEDURE — 85027 COMPLETE CBC AUTOMATED: CPT

## 2020-04-30 PROCEDURE — 97116 GAIT TRAINING THERAPY: CPT

## 2020-04-30 PROCEDURE — 86900 BLOOD TYPING SEROLOGIC ABO: CPT

## 2020-04-30 PROCEDURE — 85379 FIBRIN DEGRADATION QUANT: CPT

## 2020-04-30 PROCEDURE — 82553 CREATINE MB FRACTION: CPT

## 2020-04-30 RX ORDER — SODIUM CHLORIDE 9 MG/ML
1000 INJECTION, SOLUTION INTRAVENOUS
Refills: 0 | Status: DISCONTINUED | OUTPATIENT
Start: 2020-04-30 | End: 2020-04-30

## 2020-04-30 RX ADMIN — ALBUTEROL 2 PUFF(S): 90 AEROSOL, METERED ORAL at 05:55

## 2020-04-30 RX ADMIN — FAMOTIDINE 40 MILLIGRAM(S): 10 INJECTION INTRAVENOUS at 05:54

## 2020-04-30 RX ADMIN — Medication 108 GRAM(S): at 05:54

## 2020-04-30 RX ADMIN — ENOXAPARIN SODIUM 40 MILLIGRAM(S): 100 INJECTION SUBCUTANEOUS at 05:55

## 2020-04-30 NOTE — PROVIDER CONTACT NOTE (OTHER) - BACKGROUND
Pt. admitted to [address] [place] [address] [id number]  4/21/20 SOB, confused, weakness - +COVID

## 2020-04-30 NOTE — DISCHARGE NOTE FOR THE EXPIRED PATIENT - HOSPITAL COURSE
4/21- 84 M Swiss speaking, DM2 and hyperlipidemia, known COVID-19 positive with recent ED visit, brought by EMS from home for blood culture growing gram positive rods in 1/4 bottles.   Patient has 7 days of SOB, cough, fevers, weakness and AMS. Presented to ED for presyncope on 4/18, at which time cultures were drawn. Patient was not admitted as he was doing ok.  He is now satting 92% on RA. Per sons, patient has been more confused for about 3 days: does not appropriately answer questions, defecates on himself. His sons think he is having diarrhea from the medication.  He completed a Z-pack and has taken 3 days of Plaquenil.  He was also prescribed zinc and vitamin c by his outpatient provider.   CXR in ED showing: INTERPRETATION:  Faint bilateral patchy opacities, suggestive of infection.    The patient was admitted to medicine and provided supportive care.  Plaquenil was continued (had started as an outpatient), daily Lovenox provided increased hypercoagulable state in COVID-19 patients.  Physical therapy/Occupational therapy was consulted and worked with patient.    4/23- CT head done for AMS- CTH without acute changes, d/w neurology-  could be hospital delirium vs encephlapathy seen with covid.    4/24 -UCX concerning for enterocouccus, though less than 99,000 abx started given his fevers and now worsening clinical status.  Pulmonology consulted today given pts worsening hypoxia and need of NRB and tocizulmab started.  ID consulted- not a remdesivir candidate as the he has been positive for >4 days.    4/25- Family discussion patient remains full code at this time, consented to plasma trial.    4/28- RRT called for overnight hypoxia to high 70s on 15L NRB. Prior to RRT patient received 125 mg of IV solumedrol. During RRT he was proned with marked improvement in oxygenation. CXR showed worsening b/l pulmonary infiltrates/pulmonary edema. IV fluids were discontinued. He was given furosemide 40 mg IV x 1. Oxygen improved to 88%. On exam mildly tachypneic and tachycardic. Remains full code.    Another RRT called later in the day for hypoxemia in NRB.  Family initially opted for aggressive measures, however another family discussion/GOC conversation took place after the RRT for hypoxia at ~12:30/1PM.  At this time patients sons (Dale and Omar) made their father DNR/DNI. They were invited to see their father for a short visit today given his declining status.  Ultimately, after reviewing his clinical status (age, comorbidities, severe hypoxic respiratory failure 2/2 COVID despite trials of Plaquenil, tocilizumab, and plasma), they decided to maintain the DNR/DNI and would like to offer him medication (ie dilaudid or morphine) for respiratory distress. They would also like him to continue to receive PO fluids and food, as tolerated; they do understand he is at an increased risk of aspiration. At this time, they do not want to withdraw any medications or lab draws as they are hopeful that he will recover. Palliative care was consulted to assist with these difficult conversations. 4/21- 84 M Syrian speaking, DM2 and hyperlipidemia, known COVID-19 positive with recent ED visit, brought by EMS from home for blood culture growing gram positive rods in 1/4 bottles.   Patient has 7 days of SOB, cough, fevers, weakness and AMS. Presented to ED for presyncope on 4/18, at which time cultures were drawn. Patient was not admitted as he was doing ok.  He is now satting 92% on RA. Per sons, patient has been more confused for about 3 days: does not appropriately answer questions, defecates on himself. His sons think he is having diarrhea from the medication.  He completed a Z-pack and has taken 3 days of Plaquenil.  He was also prescribed zinc and vitamin c by his outpatient provider.   CXR in ED showing: INTERPRETATION:  Faint bilateral patchy opacities, suggestive of infection.    The patient was admitted to medicine and provided supportive care.  Plaquenil was continued (had started as an outpatient), daily Lovenox provided increased hypercoagulable state in COVID-19 patients.  Physical therapy/Occupational therapy was consulted and worked with patient.    4/23- CT head done for AMS- CTH without acute changes, d/w neurology-  could be hospital delirium vs encephlapathy seen with covid.    4/24 -UCX concerning for enterocouccus, though less than 99,000 abx started given his fevers and now worsening clinical status.  Pulmonology consulted today given pts worsening hypoxia and need of NRB and tocizulmab started.  ID consulted- not a remdesivir candidate as the he has been positive for >4 days.    4/25- Family discussion patient remains full code at this time, consented to plasma trial.    4/28- RRT called for overnight hypoxia to high 70s on 15L NRB. Prior to RRT patient received 125 mg of IV solumedrol. During RRT he was proned with marked improvement in oxygenation. CXR showed worsening b/l pulmonary infiltrates/pulmonary edema. IV fluids were discontinued. He was given furosemide 40 mg IV x 1. Oxygen improved to 88%. On exam mildly tachypneic and tachycardic. Remains full code.    Another RRT called later in the day for hypoxemia in NRB.  Family initially opted for aggressive measures, however another family discussion/GOC conversation took place after the RRT for hypoxia at ~12:30/1PM.  At this time patients sons (Art) made their father DNR/DNI. They were invited to see their father for a short visit today given his declining status.  Ultimately, after reviewing his clinical status (age, comorbidities, severe hypoxic respiratory failure 2/2 COVID despite trials of Plaquenil, tocilizumab, and plasma), they decided to maintain the DNR/DNI and would like to offer him medication (ie dilaudid or morphine) for respiratory distress. They would also like him to continue to receive PO fluids and food, as tolerated; they do understand he is at an increased risk of aspiration. At this time, they do not want to withdraw any medications or lab draws as they are hopeful that he will recover. Palliative care was consulted to assist with these difficult conversations.    4/29- RRT called for severe hypoxia, however with fixing of NRB, pt's O2 sat increased to 90%. DNR/DNI with not full comfort measure (see notes from 4/28), Dilaudid and ativan ordered PRN for dyspnea.    4/30- Notified by RN that the patient without spontaneous cardiac or respiratory activity at 8:28 am. Patient is DNR/DNI with some comfort measures, but not full comfort care (See notes from 4/28).  Patient seen and examined at 8:32 am on 04/30/2020. Patient was unresponsive to painful stimulation.  Heart and lung sounds are absent. No spontaneous cardiac or respiratory activity. Patient is not responding to verbal or painful stimuli. No corneal pupillary reflex present. Pupils fixed and dilated.   Patient pronounced dead at 8:34 am on 04/30/2020. Cause of death respiratory failure secondary to COVID 19.  Patient's sons, (Sherry) were notified and condolences were provided. As per admitting, no autopsy offered. Family offered visitation and family planning to visit today. Attending physician Dr. Maher notified and offered condolences to family as well.

## 2020-04-30 NOTE — PROGRESS NOTE ADULT - REASON FOR ADMISSION
positive blood culture
positive blood culture, hypoxemia secondary to cOVID infection
positive blood culture

## 2020-04-30 NOTE — PROGRESS NOTE ADULT - SUBJECTIVE AND OBJECTIVE BOX
****PRELIM NOTE*****      Overnight, RRT called/cancelled for hypoxia in setting of NRB displaced. Upon repositioning of NRB, O2 sats improved to 89-91%. Patient not seen prior to expiration. Informed by PAULA Morin that patient passed and was pronounced dead at 8:34AM.   Family (sons, Dale and Mau) were informed.   I personally spoke with Mau who will be coming in later to see his father.

## 2020-04-30 NOTE — PROVIDER CONTACT NOTE (OTHER) - ACTION/TREATMENT ORDERED:
Judith/Trauma PA made aware - Patient assessed/Pronounced by team/PP/RN
Non-Rebreather replaced, O2 Sat improved - 89-91, RRT called/Cancelled. Pt. stable at this time
Kayaog 6 unnits given - Additional order received for Lantus 6 units/PP/RN
PA made aware; ordered wrist restraints & applied. Will cont to monitor.
PA wants no orders at this time, states she will review chart again
as per NP Sanjana, 125mg of solumedrol to be given as order. cont to monitor pt. safety maintained.

## 2020-04-30 NOTE — PROGRESS NOTE ADULT - ASSESSMENT
84M Chinese speaking, DM2, HLD, known COVID-19 positive with recent ED visit, brought by EMS from home for blood culture growing gram positive rods consistent with 2 different species of cornyebacterium in 2/4 bottles and AMS. AMS improving since admission. Course c/b hypoxia requiring NRB since 4/23, s/p Plaquenil (initiated as outpatient) s/p toco 4/24, s/p plasma trial 4/25, on abx for possible UTI vs bacteruria, repeat blood cx with NGTD.    #COVID-19 infection, Viral Pneumonia 2/2 Novel Coronavirus Infection (COVID-19): S/p Plaquenil (completed 4/22), s/p dose of tocizulmab 4/24; s/p Plasma trial (4/25)  --Remains on NRB since 4/23, satting <90%  --DNR/DNI at this time; will continue NRB, proning (as tolerated)  --Dilaudid PRN for SOB/dyspnea; appreciate palliative care input  --Closely monitor respiratory status and escalate O2 therapy as necessary to maintain SpO2 92-96%; currently 100% NRB -> intubation (avoiding BiPAP/CPAP given risk of aerosolizing virus). Use continuous oximetry monitoring if/when on NRB.  --c/w daily lovenox given increased hypercoagulable state in COVID-19 patients (BMI > 30 and CrCl > 15ml/min: Lovenox 40mg SQ BID)  --Monitor daily (or as needed): CBC, CMP, Mg, Phos, CPK. Monitor inflammatory markers [id number] to monitor disease progression (procalcitonin, CRP, ESR, ferritin, coags, D-dimer, LDH)   --Limit IVF given risk of ARDS  --Appreciate pulmonology input    #Hypernatremia: Likely 2/2 poor PO intake  --Will plan for small, slow volume of D5W and recheck Na this afternoon  --Need to limit IVF given respiratory status (and patient currently DNI)    #Bacteremia, bacteruria: GNR bacteremia in 2 of 4 cultures from 4/19 consistent with two different corynebacterium. S/p course of azithromycin prior to positive culture result  --UCX concerning for enterococcus though less than 99,000, started on ampicillin for 5 day course (4/24-4/30)  --Follow up repeat cultures (sent on 4/21, 4/22)--NG    #Encephalopathy/Altered mental status at admission: Likely 2/2 infection. CT head without acute changes  --Frequent orientation  --Restraints as needed to prevent removal of supplemental O2  --Per prior discussion with neurology, likely hospital delirium vs encephalopathy seen with COVID-19    #Type 2DM: A1c 8.4  --Continue to hold home meds for now  --Goal FSBG 100-180   --Low dose ISS for now  --Will increase Lantus to 12U daily after discussion with HIC given elevated blood glucose and increased PRN needs yesterday (12U additional given yesterday)  --Can consider additional of standing 2U lispro before meals if FSBG remain elevated  --Diabetic diet     #HTN: Not on home meds for HTN  --Continue to monitor, goal <130/80 in DM   --If remains elevated, can start low dose Norvasc as needed     #Constipation: Patient unable to state clearly whether he has had BM and this AM nurse was unsure  --Currently only on senna QHS, but has received one time doses of lactulose, suppository, etc  --Will adjust as needed    #Elevated liver tests: Suspect 2/2 COVID-19  --No indication for imaging at this time    #DVT Prophylaxis  --Given increased hypercoagulable state in COVID-19 patients, c/w Lovenox 40 BID: BMI > 30 and CrCl > 15ml/min  --Will consider need for extended prophylaxis prior to discharge based on D-Dimer and calculated VTE risk.    #DC planning/care coordination  --DNR/DNI with some comfort measures, but not full comfort care (See notes from 4/28)  --Appreciate palliative care input  --Contact: Dale Wiley [phone] (updated daily); Mau (son) [phone]; family updated daily  --Eventual plan for discharge with proper post-hospitalization instructions, including self-isolation at home, close monitoring of symptoms, etc.   --PT eval/case management once clinically stable    Above care plan discussed with: 7 Duane STERLING, Shahnaz Maher MD  Attending, Division of Gastroenterology 84M Mosotho speaking, DM2, HLD, known COVID-19 positive with recent ED visit, brought by EMS from home for blood culture growing gram positive rods consistent with 2 different species of cornyebacterium in 2/4 bottles and AMS. AMS improving since admission. Course c/b hypoxia requiring NRB since 4/23, s/p Plaquenil (initiated as outpatient) s/p toco 4/24, s/p plasma trial 4/25, on abx for possible UTI vs bacteruria, repeat blood cx with NGTD.    #COVID-19 infection, Viral Pneumonia 2/2 Novel Coronavirus Infection (COVID-19): S/p Plaquenil (completed 4/22), s/p dose of tocizulmab 4/24; s/p Plasma trial (4/25)  --Remains on NRB since 4/23, satting <90%  --DNR/DNI at this time; will continue NRB, proning (as tolerated)  --Dilaudid PRN for SOB/dyspnea; appreciate palliative care input  --Closely monitor respiratory status and escalate O2 therapy as necessary to maintain SpO2 92-96%; currently 100% NRB -> intubation (avoiding BiPAP/CPAP given risk of aerosolizing virus). Use continuous oximetry monitoring if/when on NRB.  --c/w daily lovenox given increased hypercoagulable state in COVID-19 patients (BMI > 30 and CrCl > 15ml/min: Lovenox 40mg SQ BID)  --Monitor daily (or as needed): CBC, CMP, Mg, Phos, CPK. Monitor inflammatory markers [id number] to monitor disease progression (procalcitonin, CRP, ESR, ferritin, coags, D-dimer, LDH)   --Limit IVF given risk of ARDS  --Appreciate pulmonology input    #Hypernatremia: Likely 2/2 poor PO intake  --Will plan for small, slow volume of D5W and recheck Na this afternoon  --Need to limit IVF given respiratory status (and patient currently DNI)    #Bacteremia, bacteruria: GNR bacteremia in 2 of 4 cultures from 4/19 consistent with two different corynebacterium. S/p course of azithromycin prior to positive culture result  --UCX concerning for enterococcus though less than 99,000, started on ampicillin for 5 day course (4/24-4/30)  --Follow up repeat cultures (sent on 4/21, 4/22)--NG    #Encephalopathy/Altered mental status at admission: Likely 2/2 infection. CT head without acute changes  --Frequent orientation  --Restraints as needed to prevent removal of supplemental O2  --Per prior discussion with neurology, likely hospital delirium vs encephalopathy seen with COVID-19    #Type 2DM: A1c 8.4  --Continue to hold home meds for now  --Goal FSBG 100-180   --Low dose ISS for now  --Will increase Lantus to 12U daily after discussion with HIC given elevated blood glucose and increased PRN needs yesterday (12U additional given yesterday)  --Can consider additional of standing 2U lispro before meals if FSBG remain elevated  --Diabetic diet     #HTN: Not on home meds for HTN  --Continue to monitor, goal <130/80 in DM   --If remains elevated, can start low dose Norvasc as needed     #Constipation: Patient unable to state clearly whether he has had BM and this AM nurse was unsure  --Currently only on senna QHS, but has received one time doses of lactulose, suppository, etc  --Will adjust as needed    #Elevated liver tests: Suspect 2/2 COVID-19  --No indication for imaging at this time    #DVT Prophylaxis  --Given increased hypercoagulable state in COVID-19 patients, c/w Lovenox 40 BID: BMI > 30 and CrCl > 15ml/min  --Will consider need for extended prophylaxis prior to discharge based on D-Dimer and calculated VTE risk.    #DC planning/care coordination  --DNR/DNI with some comfort measures, but not full comfort care (See notes from 4/28)  --Appreciate palliative care input  --Contact: Dale Wiley [phone] (updated daily); Mau (son) [phone]; family updated daily  --Eventual plan for discharge with proper post-hospitalization instructions, including self-isolation at home, close monitoring of symptoms, etc.   --PT eval/case management once clinically stable    Above care plan discussed with: 7 Duane STERLING, Judith Maher MD  Attending, Division of Gastroenterology 84M Somali speaking, DM2, HLD, known COVID-19 positive with recent ED visit, brought by EMS from home for blood culture growing gram positive rods consistent with 2 different species of cornyebacterium in 2/4 bottles and AMS. AMS improving since admission. Course c/b hypoxia requiring NRB since 4/23, s/p Plaquenil (initiated as outpatient) s/p toco 4/24, s/p plasma trial 4/25, on abx for possible UTI vs bacteruria, repeat blood cx with NGTD.    #COVID-19 infection, Viral Pneumonia 2/2 Novel Coronavirus Infection (COVID-19): S/p Plaquenil (completed 4/22), s/p dose of tocizulmab 4/24; s/p Plasma trial (4/25)  --Remains on NRB since 4/23, satting <90%  --DNR/DNI at this time; will continue NRB, proning (as tolerated)  --Dilaudid PRN for SOB/dyspnea; appreciate palliative care input  --Closely monitor respiratory status and escalate O2 therapy as necessary to maintain SpO2 92-96%; currently 100% NRB -> intubation (avoiding BiPAP/CPAP given risk of aerosolizing virus). Use continuous oximetry monitoring if/when on NRB.  --c/w daily lovenox given increased hypercoagulable state in COVID-19 patients (BMI > 30 and CrCl > 15ml/min: Lovenox 40mg SQ BID)  --Monitor daily (or as needed): CBC, CMP, Mg, Phos, CPK. Monitor inflammatory markers [id number] to monitor disease progression (procalcitonin, CRP, ESR, ferritin, coags, D-dimer, LDH)   --Limit IVF given risk of ARDS  --Appreciate pulmonology input    #Hypernatremia, GENE: Na increased to 149 with associated increase in Cr. Likely 2/2 poor PO intake  --Will plan for small, slow volume of 1/2NS and recheck Na this afternoon/early evening  --Need to limit IVF given respiratory status (and patient currently DNI)  --Continued GOC    #Bacteremia, bacteruria: GNR bacteremia in 2 of 4 cultures from 4/19 consistent with two different corynebacterium. S/p course of azithromycin prior to positive culture result  --UCX concerning for enterococcus though less than 99,000, started on ampicillin for 5 day course (4/24-4/30)  --Follow up repeat cultures (sent on 4/21, 4/22)--NG    #Encephalopathy/Altered mental status at admission: Likely 2/2 infection. CT head without acute changes  --Frequent orientation  --Restraints as needed to prevent removal of supplemental O2  --Per prior discussion with neurology, likely hospital delirium vs encephalopathy seen with COVID-19    #Type 2DM: A1c 8.4  --Continue to hold home meds for now  --Goal FSBG 100-180   --Low dose ISS for now  --Will increase Lantus to 12U daily after discussion with HIC given elevated blood glucose and increased PRN needs yesterday (12U additional given yesterday)  --Can consider additional of standing 2U lispro before meals if FSBG remain elevated  --Diabetic diet     #HTN: Not on home meds for HTN  --Continue to monitor, goal <130/80 in DM   --If remains elevated, can start low dose Norvasc as needed     #Constipation: Patient unable to state clearly whether he has had BM and this AM nurse was unsure  --Currently only on senna QHS, but has received one time doses of lactulose, suppository, etc  --Will adjust as needed    #Elevated liver tests: Suspect 2/2 COVID-19  --No indication for imaging at this time    #DVT Prophylaxis  --Given increased hypercoagulable state in COVID-19 patients, c/w Lovenox 40 BID: BMI > 30 and CrCl > 15ml/min  --Will consider need for extended prophylaxis prior to discharge based on D-Dimer and calculated VTE risk.    #DC planning/care coordination  --DNR/DNI with some comfort measures, but not full comfort care (See notes from 4/28)  --Appreciate palliative care input  --Contact: Dale Wiley [phone] (updated daily); Mau (son) [phone]; family updated daily  --Eventual plan for discharge with proper post-hospitalization instructions, including self-isolation at home, close monitoring of symptoms, etc.   --PT eval/case management once clinically stable    Above care plan discussed with: Alexandra Zepeda ACP, Judith Maher MD  Attending, Division of Gastroenterology

## 2020-04-30 NOTE — CHART NOTE - NSCHARTNOTEFT_GEN_A_CORE
NOTE OF THE  PATIENT    Notified by RN that the patient without spontaneous cardiac or respiratory activity at 8:28 am. Patient is DNR/DNI on comfort care.     Patient seen and examined at 8:32 am on 2020. Patient was unresponsive to painful stimulation.    Heart and lung sounds are absent. No spontaneous cardiac or respiratory activity. Patient is not responding to verbal or painful stimuli. No corneal pupillary reflex present. Pupils fixed and dilated. Patient pronounced dead at 8:34 am on 2020. Cause of death respiratory failure secondary to COVID 19.    Patient's sons, Edmund and Christopher were notified and condolences were provided. As per admitting, no autopsy offered. Family offered visitation and family planning to visit today. Dr. Maher notified and offered condolences to family as well. NOTE OF THE  PATIENT    Notified by RN that the patient without spontaneous cardiac or respiratory activity at 8:28 am. Patient is DNR/DNI on comfort care.     Patient seen and examined at 8:32 am on 2020. Patient was unresponsive to painful stimulation.  Heart and lung sounds are absent. No spontaneous cardiac or respiratory activity. Patient is not responding to verbal or painful stimuli. No corneal pupillary reflex present. Pupils fixed and dilated.     Patient pronounced dead at 8:34 am on 2020. Cause of death respiratory failure secondary to COVID 19.    Patient's sons, (Dale and Mau) were notified and condolences were provided. As per admitting, no autopsy offered. Family offered visitation and family planning to visit today. Attending physician Dr. Maher notified and offered condolences to family as well.      Judith Morin PA-C  #[id number] NOTE OF THE  PATIENT    Notified by RN that the patient without spontaneous cardiac or respiratory activity at 8:28 am. Patient is DNR/DNI with some comfort measures, but not full comfort care (See notes from ).    Patient seen and examined at 8:32 am on 2020. Patient was unresponsive to painful stimulation.  Heart and lung sounds are absent. No spontaneous cardiac or respiratory activity. Patient is not responding to verbal or painful stimuli. No corneal pupillary reflex present. Pupils fixed and dilated.     Patient pronounced dead at 8:34 am on 2020. Cause of death respiratory failure secondary to COVID 19.    Patient's sons, (Dale and Mau) were notified and condolences were provided. As per admitting, no autopsy offered. Family offered visitation and family planning to visit today. Attending physician Dr. Maher notified and offered condolences to family as well.      Judith Morin PA-C  #[id number]

## 2021-02-26 NOTE — PROVIDER CONTACT NOTE (OTHER) - BACKGROUND
A1c 10.7% seen by Dr Morrow and was given trajenta and increased dose of  glimiperide, patient states his sugars are now  in the  100's and  is coming for surgery tomorrow.
Type 2 DM on Glimepiride 4mg po bid and Tradjenta 5 mg po daily.  Monitors glucose at home states  glucose < 200mg/dl. He  has not taken Tradjenta for 2 weeks due to need for prescription
angiogram/yes

## 2021-04-19 NOTE — DISCHARGE NOTE FOR THE EXPIRED PATIENT - NAME OF PERSON WHO MADE CONTACTED FAMILY
Dale
You can access the FollowMyHealth Patient Portal offered by [facility] by registering at the following website: [email or web address]. By joining [facility]’s FollowMyHealth portal, you will also be able to view your health information using other applications (apps) compatible with our system.

## 2021-06-07 NOTE — ED PROVIDER NOTE - PSH
Health Maintenance Summary     Topic Due On Due Status Completed On    IMMUNIZATION - IPV  Completed Jul 7, 2010    IMMUNIZATION - MMR  Completed Jul 7, 2010    IMMUNIZATION - VARICELLA  Completed Jul 7, 2010    IMMUNIZATION - HEPATITIS B  Completed Dec 5, 2005    IMMUNIZATION - HEPATITIS A  Completed Aug 29, 2016    IMMUNIZATION - MENINGITIS May 27, 2016 Overdue     IMMUNIZATION - HPV  May 27, 2016 Overdue     IMMUNIZATION - DTaP/Tdap/Td Aug 29, 2026 Not Due Aug 29, 2016    Immunization-Influenza Sep 1, 2018 Not Due     Depression Screening May 27, 2017 Overdue           Patient is due for topics as listed above, he wishes to discuss with provider .             Post-Op Assessment Note    CV Status:  Stable    Pain management: adequate     Mental Status:  Alert and awake   Hydration Status:  Stable   PONV Controlled:  None   Airway Patency:  Patent      Post Op Vitals Reviewed: Yes      Staff: CRNA   Comments: vss, report to rn        No complications documented      BP      Temp      Pulse     Resp      SpO2 History of carpal tunnel surgery of right wrist    S/P hernia repair  approx 2013  S/P left cataract extraction  approx 2014  S/P right cataract extraction  approx 2014

## 2022-05-03 NOTE — PROGRESS NOTE ADULT - PROBLEM/PLAN-3
DISPLAY PLAN FREE TEXT
Yes

## 2022-08-26 NOTE — ASU PATIENT PROFILE, ADULT - PATIENT'S HEIGHT AND WEIGHT RECORDED IN THE VITAL SIGNS FLOWSHEET
Results are abnormal. Pt cholesterol is too high. Begin pravastatin 20mg at bedtime. Begin COq10 200mg at bedtime with cholesterol medication to avoid side effects. Please pend to me if pt willing to move forward. yes

## 2023-02-20 NOTE — ED PROVIDER NOTE - FAMILY HISTORY
Problem: Safety - Adult  Goal: Free from fall injury  Outcome: Completed No pertinent family history in first degree relatives

## 2024-02-01 NOTE — PROGRESS NOTE ADULT - PROBLEM SELECTOR PROBLEM 6
DVT prophylaxis
Learning behavioral activities to cope with urges.  For example, distraction and changing routines

## 2025-06-19 NOTE — OCCUPATIONAL THERAPY INITIAL EVALUATION ADULT - PRECAUTIONS/LIMITATIONS, REHAB EVAL
well developed, well nourished , in no acute distress , ambulating without difficulty , normal communication ability Pt was not admitted as he was doing ok.  He is now satting 92% on RA. Per sons, patient has been more confused for about 3 days: does not appropriately answer questions, defecates on himself. His sons think he is having diarrhea from the medication.  He completed a Z-pack and has taken 3 days of Plaquenil.  He was also prescribed zinc and vitamin c by his outpatient provider Pt was not admitted as he was doing ok.  He is now satting 92% on RA. Per sons, patient has been more confused for about 3 days: does not appropriately answer questions, defecates on himself. His sons think he is having diarrhea from the medication.  He completed a Z-pack and has taken 3 days of Plaquenil.  He was also prescribed zinc and vitamin c by his outpatient provider/fall precautions